# Patient Record
Sex: FEMALE | Race: WHITE | NOT HISPANIC OR LATINO | Employment: UNEMPLOYED | URBAN - METROPOLITAN AREA
[De-identification: names, ages, dates, MRNs, and addresses within clinical notes are randomized per-mention and may not be internally consistent; named-entity substitution may affect disease eponyms.]

---

## 2017-12-29 ENCOUNTER — OFFICE VISIT (OUTPATIENT)
Dept: OBSTETRICS AND GYNECOLOGY | Facility: CLINIC | Age: 52
End: 2017-12-29
Payer: COMMERCIAL

## 2017-12-29 VITALS
WEIGHT: 172.5 LBS | HEIGHT: 68 IN | DIASTOLIC BLOOD PRESSURE: 80 MMHG | SYSTOLIC BLOOD PRESSURE: 118 MMHG | BODY MASS INDEX: 26.14 KG/M2

## 2017-12-29 DIAGNOSIS — Z01.419 ENCOUNTER FOR GYNECOLOGICAL EXAMINATION: Primary | ICD-10-CM

## 2017-12-29 DIAGNOSIS — R51.9 FREQUENT HEADACHES: ICD-10-CM

## 2017-12-29 DIAGNOSIS — R11.0 NAUSEA: ICD-10-CM

## 2017-12-29 PROCEDURE — 99999 PR PBB SHADOW E&M-EST. PATIENT-LVL III: CPT | Mod: PBBFAC,,, | Performed by: OBSTETRICS & GYNECOLOGY

## 2017-12-29 PROCEDURE — 99396 PREV VISIT EST AGE 40-64: CPT | Mod: S$GLB,,, | Performed by: OBSTETRICS & GYNECOLOGY

## 2017-12-29 RX ORDER — BUTALBITAL, ACETAMINOPHEN AND CAFFEINE 50; 325; 40 MG/1; MG/1; MG/1
1 TABLET ORAL EVERY 4 HOURS PRN
Qty: 30 TABLET | Refills: 1 | Status: SHIPPED | OUTPATIENT
Start: 2017-12-29 | End: 2018-03-09 | Stop reason: SDUPTHER

## 2017-12-29 RX ORDER — TRETINOIN 0.8 MG/G
GEL TOPICAL
Refills: 2 | COMMUNITY
Start: 2017-11-14 | End: 2018-12-28

## 2017-12-29 RX ORDER — TRAMADOL HYDROCHLORIDE 50 MG/1
TABLET ORAL
COMMUNITY
Start: 2017-11-02 | End: 2019-09-13

## 2017-12-29 RX ORDER — PROMETHAZINE HYDROCHLORIDE 25 MG/1
25 TABLET ORAL
Qty: 30 TABLET | Refills: 1 | Status: SHIPPED | OUTPATIENT
Start: 2017-12-29 | End: 2018-12-28

## 2017-12-29 NOTE — PROGRESS NOTES
Subjective:       Patient ID: Sharri Hill is a 52 y.o. female.    Chief Complaint:  Well Woman (pap nl/hpv- 2016 mammo 2017)      History of Present Illness  - here for annual. Has skipped four periods twice. Had a period for three months in between. Is having some night sweats. Has had some increased hair loss; has appt with PCP in 2 weeks. Requests fioricet and phenergan refills for occasional headaches; reports that frequency of headaches has diminished since skipping periods.    Past Medical History:   Diagnosis Date    Anxiety     Esophageal reflux     Migraine     Shingles     right arm       Past Surgical History:   Procedure Laterality Date    APPENDECTOMY       SECTION      KNEE SURGERY           Current Outpatient Prescriptions:     butalbital-acetaminophen-caffeine -40 mg (FIORICET) -40 mg per tablet, Take 1 tablet by mouth every 4 (four) hours as needed for Pain., Disp: 30 tablet, Rfl: 1    cyclobenzaprine (FLEXERIL) 10 MG tablet, TAKE 1 TABLET BY MOUTH TWICE A DAY AS NEEDED FOR SPASMS, Disp: , Rfl: 5    FLUCELVAX QUAD 5244-8309 60 mcg (15 mcg x 4)/0.5 mL Susp, TO BE ADMINISTERED BY PHARMACIST, Disp: , Rfl: 0    hydrocodone-acetaminophen 7.5-325mg (NORCO) 7.5-325 mg per tablet, TAKE 1 TABLET BY MOUTH EVERY EIGHT HOURS AS NEEDED FOR PAIN, Disp: , Rfl: 0    nabumetone (RELAFEN) 750 MG tablet, Take 750 mg by mouth 2 (two) times daily., Disp: , Rfl: 5    naproxen sodium (ANAPROX DS) 550 MG tablet, 1 po q 8 hrs prn cramping, Disp: 30 tablet, Rfl: 1    promethazine (PHENERGAN) 25 MG tablet, Take 1 tablet (25 mg total) by mouth every 4 to 6 hours as needed., Disp: 30 tablet, Rfl: 1    RESTASIS 0.05 % ophthalmic emulsion, Instill1 drop in both eyes twice a day, Disp: , Rfl: 3    RETIN-A MICRO PUMP 0.08 % GlwP, THALIA AA HS, Disp: , Rfl: 2    traMADol (ULTRAM) 50 mg tablet, , Disp: , Rfl:     zolpidem (AMBIEN) 5 MG Tab, Take 5 mg by mouth every evening., Disp: , Rfl:  "2    Review of patient's allergies indicates:  No Known Allergies    GYN & OB History  No LMP recorded (within months).   Date of Last Pap: 2016    OB History    Para Term  AB Living   1 1 1     1   SAB TAB Ectopic Multiple Live Births           1      # Outcome Date GA Lbr Dylan/2nd Weight Sex Delivery Anes PTL Lv   1 Term 95 40w0d   F CS-Unspec   EDYTA          Social History     Social History    Marital status:      Spouse name: N/A    Number of children: N/A    Years of education: N/A     Occupational History    Not on file.     Social History Main Topics    Smoking status: Never Smoker    Smokeless tobacco: Never Used    Alcohol use Yes      Comment: socially    Drug use: No    Sexual activity: Yes     Partners: Male     Birth control/ protection: Partner-Vasectomy     Other Topics Concern    Not on file     Social History Narrative    No narrative on file       Family History   Problem Relation Age of Onset    Diabetes Maternal Grandmother     Diabetes Maternal Grandfather     Breast cancer Maternal Aunt     Colon cancer Neg Hx     Ovarian cancer Neg Hx     Hypertension Neg Hx        Review of Systems  Review of Systems   Respiratory: Negative for shortness of breath.    Cardiovascular: Negative for chest pain and palpitations.   Gastrointestinal: Negative for blood in stool, nausea and vomiting.   Genitourinary:        - see HPI   Skin: Negative for rash and wound.   Allergic/Immunologic: Negative for immunocompromised state.   Neurological: Negative for dizziness and syncope.   Hematological: Negative for adenopathy.   Psychiatric/Behavioral: Negative for behavioral problems.        Objective:     Vitals:    17 1134   BP: 118/80   Weight: 78.3 kg (172 lb 8.2 oz)   Height: 5' 8" (1.727 m)       Physical Exam:   Constitutional: She is oriented to person, place, and time. She appears well-developed and well-nourished.        Pulmonary/Chest: Right breast " exhibits no mass, no nipple discharge, no skin change, no tenderness and no swelling. Left breast exhibits no mass, no nipple discharge, no skin change, no tenderness and no swelling. Breasts are symmetrical.        Abdominal: Soft. She exhibits no distension. There is no tenderness.     Genitourinary: Vagina normal and uterus normal. There is no tenderness or lesion on the right labia. There is no tenderness or lesion on the left labia. Cervix is normal. Right adnexum displays no mass, no tenderness and no fullness. Left adnexum displays no mass, no tenderness and no fullness. No vaginal discharge found.           Musculoskeletal: Moves all extremeties.       Neurological: She is alert and oriented to person, place, and time.     Psychiatric: She has a normal mood and affect.        Assessment/ Plan:     Orders Placed This Encounter    butalbital-acetaminophen-caffeine -40 mg (FIORICET) -40 mg per tablet    promethazine (PHENERGAN) 25 MG tablet       Sharri was seen today for well woman.    Diagnoses and all orders for this visit:    Encounter for gynecological examination    Frequent headaches  -     butalbital-acetaminophen-caffeine -40 mg (FIORICET) -40 mg per tablet; Take 1 tablet by mouth every 4 (four) hours as needed for Pain.    Nausea  -     promethazine (PHENERGAN) 25 MG tablet; Take 1 tablet (25 mg total) by mouth every 4 to 6 hours as needed.    - reassured: very normal bleeding pattern for perimenopause.  - advised patient to discuss hair loss with PCP. If labs are normal, consider seeing Derm.    Return in about 1 year (around 12/29/2018) for annual exam.

## 2018-03-09 DIAGNOSIS — R51.9 FREQUENT HEADACHES: ICD-10-CM

## 2018-03-09 RX ORDER — BUTALBITAL, ACETAMINOPHEN AND CAFFEINE 50; 325; 40 MG/1; MG/1; MG/1
1 TABLET ORAL EVERY 4 HOURS PRN
Qty: 30 TABLET | Refills: 1 | Status: SHIPPED | OUTPATIENT
Start: 2018-03-09 | End: 2018-12-28 | Stop reason: SDUPTHER

## 2018-11-07 ENCOUNTER — TELEPHONE (OUTPATIENT)
Dept: OBSTETRICS AND GYNECOLOGY | Facility: CLINIC | Age: 53
End: 2018-11-07

## 2018-11-07 DIAGNOSIS — Z12.31 ENCOUNTER FOR SCREENING MAMMOGRAM FOR MALIGNANT NEOPLASM OF BREAST: Primary | ICD-10-CM

## 2018-12-28 ENCOUNTER — OFFICE VISIT (OUTPATIENT)
Dept: OBSTETRICS AND GYNECOLOGY | Facility: CLINIC | Age: 53
End: 2018-12-28
Payer: COMMERCIAL

## 2018-12-28 VITALS
HEIGHT: 68 IN | WEIGHT: 176.25 LBS | DIASTOLIC BLOOD PRESSURE: 80 MMHG | BODY MASS INDEX: 26.71 KG/M2 | SYSTOLIC BLOOD PRESSURE: 122 MMHG

## 2018-12-28 DIAGNOSIS — Z01.419 ENCOUNTER FOR GYNECOLOGICAL EXAMINATION: Primary | ICD-10-CM

## 2018-12-28 DIAGNOSIS — R51.9 FREQUENT HEADACHES: ICD-10-CM

## 2018-12-28 PROCEDURE — 99999 PR PBB SHADOW E&M-EST. PATIENT-LVL II: CPT | Mod: PBBFAC,,, | Performed by: OBSTETRICS & GYNECOLOGY

## 2018-12-28 PROCEDURE — 99396 PREV VISIT EST AGE 40-64: CPT | Mod: S$GLB,,, | Performed by: OBSTETRICS & GYNECOLOGY

## 2018-12-28 RX ORDER — GABAPENTIN 300 MG/1
300 CAPSULE ORAL NIGHTLY
Refills: 5 | COMMUNITY
Start: 2018-11-14 | End: 2020-07-20

## 2018-12-28 RX ORDER — PROMETHAZINE HYDROCHLORIDE 25 MG/1
TABLET ORAL
Qty: 30 TABLET | Refills: 1 | Status: SHIPPED | OUTPATIENT
Start: 2018-12-28 | End: 2019-12-30

## 2018-12-28 RX ORDER — BUTALBITAL, ACETAMINOPHEN AND CAFFEINE 50; 325; 40 MG/1; MG/1; MG/1
1 TABLET ORAL EVERY 4 HOURS PRN
Qty: 30 TABLET | Refills: 1 | Status: SHIPPED | OUTPATIENT
Start: 2018-12-28 | End: 2019-09-19 | Stop reason: SDUPTHER

## 2018-12-28 RX ORDER — PROMETHAZINE HYDROCHLORIDE 25 MG/1
TABLET ORAL
COMMUNITY
End: 2018-12-28 | Stop reason: SDUPTHER

## 2018-12-28 RX ORDER — TRETINOIN 0.6 MG/G
GEL TOPICAL
Refills: 2 | COMMUNITY
Start: 2018-12-06 | End: 2022-06-28

## 2018-12-28 NOTE — PROGRESS NOTES
Subjective:       Patient ID: Sharri Hill is a 53 y.o. female.    Chief Complaint:  Well Woman (pap nl/hpv- 2016 mammo 2018 )      History of Present Illness  - here for annual. Was amenorrheic for 11 months then had a 3 day period. No complaints. Has had some cramping but hasn't had any further bleeding. Hot flashes has greatly lessened as have headaches.    Past Medical History:   Diagnosis Date    Anxiety     Esophageal reflux     Migraine     Shingles     right arm       Past Surgical History:   Procedure Laterality Date    APPENDECTOMY       SECTION      KNEE SURGERY           Current Outpatient Medications:     butalbital-acetaminophen-caffeine -40 mg (FIORICET) -40 mg per tablet, Take 1 tablet by mouth every 4 (four) hours as needed for Pain., Disp: 30 tablet, Rfl: 1    cyclobenzaprine (FLEXERIL) 10 MG tablet, TAKE 1 TABLET BY MOUTH TWICE A DAY AS NEEDED FOR SPASMS, Disp: , Rfl: 5    gabapentin (NEURONTIN) 300 MG capsule, Take 300 mg by mouth every evening., Disp: , Rfl: 5    nabumetone (RELAFEN) 750 MG tablet, Take 750 mg by mouth 2 (two) times daily., Disp: , Rfl: 5    promethazine (PHENERGAN) 25 MG tablet, promethazine 25 mg tablet, Disp: 30 tablet, Rfl: 1    RESTASIS 0.05 % ophthalmic emulsion, Instill1 drop in both eyes twice a day, Disp: , Rfl: 3    RETIN-A MICRO PUMP 0.06 % GlwP, THALIA AA QHS, Disp: , Rfl: 2    traMADol (ULTRAM) 50 mg tablet, , Disp: , Rfl:     zolpidem (AMBIEN) 5 MG Tab, Take 5 mg by mouth every evening., Disp: , Rfl: 2    Review of patient's allergies indicates:  No Known Allergies    GYN & OB History  Patient's last menstrual period was 10/17/2018.   Date of Last Pap: 2016    OB History    Para Term  AB Living   1 1 1     1   SAB TAB Ectopic Multiple Live Births           1      # Outcome Date GA Lbr Dylan/2nd Weight Sex Delivery Anes PTL Lv   1 Term 95 40w0d   F CS-Unspec   EDYTA          Social History  "    Socioeconomic History    Marital status:      Spouse name: Not on file    Number of children: Not on file    Years of education: Not on file    Highest education level: Not on file   Social Needs    Financial resource strain: Not on file    Food insecurity - worry: Not on file    Food insecurity - inability: Not on file    Transportation needs - medical: Not on file    Transportation needs - non-medical: Not on file   Occupational History    Not on file   Tobacco Use    Smoking status: Never Smoker    Smokeless tobacco: Never Used   Substance and Sexual Activity    Alcohol use: Yes     Comment: socially    Drug use: No    Sexual activity: Yes     Partners: Male     Birth control/protection: Partner-Vasectomy   Other Topics Concern    Not on file   Social History Narrative    Not on file       Family History   Problem Relation Age of Onset    Diabetes Maternal Grandmother     Diabetes Maternal Grandfather     Breast cancer Maternal Aunt     Colon cancer Neg Hx     Ovarian cancer Neg Hx     Hypertension Neg Hx        Review of Systems  Review of Systems   Respiratory: Negative for shortness of breath.    Cardiovascular: Negative for chest pain and palpitations.   Gastrointestinal: Negative for blood in stool, nausea and vomiting.   Genitourinary:        - see HPI   Skin: Negative for rash and wound.   Allergic/Immunologic: Negative for immunocompromised state.   Neurological: Negative for dizziness and syncope.   Hematological: Negative for adenopathy.   Psychiatric/Behavioral: Negative for behavioral problems.        Objective:     Vitals:    12/28/18 1049   BP: 122/80   Weight: 79.9 kg (176 lb 4.1 oz)   Height: 5' 8" (1.727 m)       Physical Exam:   Constitutional: She is oriented to person, place, and time. She appears well-developed and well-nourished.        Pulmonary/Chest: Right breast exhibits no mass, no nipple discharge, no skin change, no tenderness and no swelling. Left " breast exhibits no mass, no nipple discharge, no skin change, no tenderness and no swelling. Breasts are symmetrical.        Abdominal: Soft. She exhibits no distension. There is no tenderness.     Genitourinary: Vagina normal and uterus normal. There is no tenderness or lesion on the right labia. There is no tenderness or lesion on the left labia. Cervix is normal. Right adnexum displays no mass, no tenderness and no fullness. Left adnexum displays no mass, no tenderness and no fullness. No vaginal discharge found.           Musculoskeletal: Moves all extremeties.       Neurological: She is alert and oriented to person, place, and time.     Psychiatric: She has a normal mood and affect.        Assessment/ Plan:     Orders Placed This Encounter    butalbital-acetaminophen-caffeine -40 mg (FIORICET) -40 mg per tablet    promethazine (PHENERGAN) 25 MG tablet       Sharri was seen today for well woman.    Diagnoses and all orders for this visit:    Encounter for gynecological examination    Frequent headaches  -     butalbital-acetaminophen-caffeine -40 mg (FIORICET) -40 mg per tablet; Take 1 tablet by mouth every 4 (four) hours as needed for Pain.    Other orders  -     promethazine (PHENERGAN) 25 MG tablet; promethazine 25 mg tablet        Follow-up in about 1 year (around 12/28/2019) for annual exam.

## 2019-08-08 RX ORDER — ZOLPIDEM TARTRATE 5 MG/1
5 TABLET ORAL NIGHTLY
Qty: 30 TABLET | Refills: 0 | Status: SHIPPED | OUTPATIENT
Start: 2019-08-08 | End: 2019-09-07

## 2019-08-08 NOTE — TELEPHONE ENCOUNTER
Spoke to pt, she has appt with you on 9/13. Asking for Ambien 10mg 1 po qhs to be sent to [facility] in [place]. Last filled the first week of July. Ok to fill?

## 2019-08-08 NOTE — TELEPHONE ENCOUNTER
----- Message from Nat Perdue sent at 8/8/2019 11:39 AM CDT -----  Contact: Sharri  Type:  RX Refill Request    Who Called:  Sharri  Refill or New Rx: Refill  RX Name and Strength: Zoltizoltizem  10 mg  How is the patient currently taking it? (ex. 1XDay): 1XDAY  Is this a 30 day or 90 day RX: 30 Day  Preferred Pharmacy with phone number: [facility] [phone]  Local or Mail Order: Local   Ordering Provider:Lidya  Would the patient rather a call back or a response via [facility]? Call Back  Best Call Back Number: [phone]  Additional Information:Pt has appt scheduled for 9/13/19  Pt is requesting a refill on   Zoltizoltizem  10 mg.

## 2019-08-21 ENCOUNTER — TELEPHONE (OUTPATIENT)
Dept: OBSTETRICS AND GYNECOLOGY | Facility: CLINIC | Age: 54
End: 2019-08-21

## 2019-08-21 DIAGNOSIS — Z12.31 VISIT FOR SCREENING MAMMOGRAM: Primary | ICD-10-CM

## 2019-08-21 NOTE — TELEPHONE ENCOUNTER
"Bernardo pt- states for the past 8 months she has been having pain with intercourse. The last few times though, she said she could not get through is because it "feels like hitting a brick wall." Lubricant does not help at all.   Friends suggested it might be menopause. Had a period for the first time in 11.5 months in July. Did not need a tampon, only brown in color. This is the 2nd time she has gone 11.5 months and then gotten a period.   Did have night sweats for bit, they are gone now.  Does NOT want HRT    Scheduled on 9/19 with Bernardo. Declined earlier options, [facility] only    MM[facility] order also faxed to [facility] per request  "

## 2019-08-21 NOTE — TELEPHONE ENCOUNTER
Dr. Chang pt, says she been experiencing pain during intercourse for about a year now. Last 8 months she says its gotten worse. Pt states she is still having periods so doesn't think its because of menopause. Please call pt and advise, thank you.

## 2019-08-30 ENCOUNTER — PATIENT OUTREACH (OUTPATIENT)
Dept: ADMINISTRATIVE | Facility: HOSPITAL | Age: 54
End: 2019-08-30

## 2019-09-05 RX ORDER — ZOLPIDEM TARTRATE 10 MG/1
10 TABLET ORAL NIGHTLY PRN
Qty: 30 TABLET | Refills: 0 | Status: SHIPPED | OUTPATIENT
Start: 2019-09-05 | End: 2019-09-13 | Stop reason: SDUPTHER

## 2019-09-05 RX ORDER — ZOLPIDEM TARTRATE 10 MG/1
10 TABLET ORAL NIGHTLY PRN
COMMUNITY
End: 2019-09-05 | Stop reason: SDUPTHER

## 2019-09-13 ENCOUNTER — OFFICE VISIT (OUTPATIENT)
Dept: FAMILY MEDICINE | Facility: CLINIC | Age: 54
End: 2019-09-13
Payer: COMMERCIAL

## 2019-09-13 VITALS
RESPIRATION RATE: 18 BRPM | TEMPERATURE: 98 F | WEIGHT: 157.31 LBS | OXYGEN SATURATION: 98 % | BODY MASS INDEX: 24.69 KG/M2 | DIASTOLIC BLOOD PRESSURE: 86 MMHG | SYSTOLIC BLOOD PRESSURE: 118 MMHG | HEIGHT: 67 IN | HEART RATE: 85 BPM

## 2019-09-13 DIAGNOSIS — Z00.00 ANNUAL PHYSICAL EXAM: Primary | ICD-10-CM

## 2019-09-13 DIAGNOSIS — G47.09 OTHER INSOMNIA: ICD-10-CM

## 2019-09-13 DIAGNOSIS — M54.12 CERVICAL RADICULOPATHY: ICD-10-CM

## 2019-09-13 DIAGNOSIS — M17.12 PRIMARY OSTEOARTHRITIS OF LEFT KNEE: ICD-10-CM

## 2019-09-13 DIAGNOSIS — M50.90 CERVICAL DISC DISEASE: ICD-10-CM

## 2019-09-13 DIAGNOSIS — F41.9 ANXIETY: ICD-10-CM

## 2019-09-13 PROCEDURE — 99999 PR PBB SHADOW E&M-EST. PATIENT-LVL III: CPT | Mod: PBBFAC,,, | Performed by: INTERNAL MEDICINE

## 2019-09-13 PROCEDURE — 99214 OFFICE O/P EST MOD 30 MIN: CPT | Mod: S$GLB,,, | Performed by: INTERNAL MEDICINE

## 2019-09-13 PROCEDURE — 99214 PR OFFICE/OUTPT VISIT, EST, LEVL IV, 30-39 MIN: ICD-10-PCS | Mod: S$GLB,,, | Performed by: INTERNAL MEDICINE

## 2019-09-13 PROCEDURE — 99999 PR PBB SHADOW E&M-EST. PATIENT-LVL III: ICD-10-PCS | Mod: PBBFAC,,, | Performed by: INTERNAL MEDICINE

## 2019-09-13 RX ORDER — DICLOFENAC SODIUM 10 MG/G
GEL TOPICAL
COMMUNITY
Start: 2019-08-21 | End: 2019-09-13 | Stop reason: ALTCHOICE

## 2019-09-13 RX ORDER — ZOLPIDEM TARTRATE 10 MG/1
10 TABLET ORAL NIGHTLY PRN
Qty: 30 TABLET | Refills: 0 | Status: SHIPPED | OUTPATIENT
Start: 2019-09-13 | End: 2019-10-01 | Stop reason: SDUPTHER

## 2019-09-13 RX ORDER — ESCITALOPRAM OXALATE 10 MG/1
10 TABLET ORAL DAILY
Qty: 30 TABLET | Refills: 0 | Status: SHIPPED | OUTPATIENT
Start: 2019-09-13 | End: 2019-10-01 | Stop reason: SDUPTHER

## 2019-09-13 RX ORDER — NABUMETONE 750 MG/1
750 TABLET, FILM COATED ORAL 2 TIMES DAILY
Qty: 60 TABLET | Refills: 0 | Status: SHIPPED | OUTPATIENT
Start: 2019-09-13 | End: 2019-10-30 | Stop reason: SDUPTHER

## 2019-09-13 RX ORDER — HYDROCODONE BITARTRATE AND ACETAMINOPHEN 7.5; 325 MG/1; MG/1
TABLET ORAL
COMMUNITY
Start: 2019-08-13 | End: 2019-12-30

## 2019-09-13 NOTE — PROGRESS NOTES
[facility] Note    Chief Complaint      Chief Complaint   Patient presents with    Medication Refill    Anxiety       History of Present Illness      Sharri Hill is a 54 y.o. female who presents today for   Chief Complaint   Patient presents with    Medication Refill    Anxiety   .  Patient comes to appointment for annual preventative visit . She is feeling well complaining of some anxiety . Is dealing with some issues of stress related to her current neck issues .     Problem List Items Addressed This Visit        Neuro    Cervical disc disease    Cervical radiculopathy       Orthopedic    Primary osteoarthritis of left knee       Other    Annual physical exam - Primary    Overview     pe documented needs full screening labs will order          Other insomnia      Other Visit Diagnoses     Anxiety                Past Medical History:  Past Medical History:   Diagnosis Date    Anxiety     Esophageal reflux     Migraine     Shingles     right arm       Past Surgical History:  Past Surgical History:   Procedure Laterality Date    APPENDECTOMY       SECTION      KNEE SURGERY         Family History:  family history includes Breast cancer in her maternal aunt; Diabetes in her maternal grandfather and maternal grandmother.    Social History:  Social History     Socioeconomic History    Marital status:      Spouse name: Not on file    Number of children: Not on file    Years of education: Not on file    Highest education level: Not on file   Occupational History    Not on file   Social Needs    Financial resource strain: Not on file    Food insecurity:     Worry: Not on file     Inability: Not on file    Transportation needs:     Medical: Not on file     Non-medical: Not on file   Tobacco Use    Smoking status: Never Smoker    Smokeless tobacco: Never Used   Substance and Sexual Activity    Alcohol use: Yes     Comment: socially    Drug use: No    Sexual  activity: Yes     Partners: Male     Birth control/protection: Partner-Vasectomy   Lifestyle    Physical activity:     Days per week: Not on file     Minutes per session: Not on file    Stress: Not on file   Relationships    Social connections:     Talks on phone: Not on file     Gets together: Not on file     Attends Zoroastrianism service: Not on file     Active member of club or organization: Not on file     Attends meetings of clubs or organizations: Not on file     Relationship status: Not on file   Other Topics Concern    Not on file   Social History Narrative    Not on file       Review of Systems:   Review of Systems   Constitutional: Negative for fever and weight loss.   HENT: Negative for congestion, hearing loss and sore throat.    Eyes: Negative for blurred vision.   Respiratory: Negative for cough and shortness of breath.    Cardiovascular: Negative for chest pain, palpitations, claudication and leg swelling.   Gastrointestinal: Negative for abdominal pain, constipation and diarrhea.   Genitourinary: Negative for dysuria.   Musculoskeletal: Positive for neck pain. Negative for back pain and myalgias.   Skin: Negative for rash.   Neurological: Positive for tingling. Negative for focal weakness and headaches.   Psychiatric/Behavioral: Negative for depression and suicidal ideas. The patient has insomnia. The patient is not nervous/anxious.          Medications:  Outpatient Encounter Medications as of 9/13/2019   Medication Sig Note Dispense Refill    butalbital-acetaminophen-caffeine -40 mg (FIORICET) -40 mg per tablet Take 1 tablet by mouth every 4 (four) hours as needed for Pain.  30 tablet 1    gabapentin (NEURONTIN) 300 MG capsule Take 300 mg by mouth every evening.   5    HYDROcodone-acetaminophen (NORCO) 7.5-325 mg per tablet        nabumetone (RELAFEN) 750 MG tablet Take 1 tablet (750 mg total) by mouth 2 (two) times daily.  60 tablet 0    RESTASIS 0.05 % ophthalmic emulsion  Instill1 drop in both eyes twice a day 11/10/2016: Received from: External Pharmacy  3    RETIN-A MICRO PUMP 0.06 % GlwP THALIA AA QHS 9/13/2019: Take as needed  2    zolpidem (AMBIEN) 10 mg Tab Take 1 tablet (10 mg total) by mouth nightly as needed.  30 tablet 0    [DISCONTINUED] diclofenac sodium (VOLTAREN) 1 % Gel        [DISCONTINUED] nabumetone (RELAFEN) 750 MG tablet Take 750 mg by mouth 2 (two) times daily. 11/10/2016: Received from: External Pharmacy  5    [DISCONTINUED] zolpidem (AMBIEN) 10 mg Tab Take 1 tablet (10 mg total) by mouth nightly as needed.  30 tablet 0    escitalopram oxalate (LEXAPRO) 10 MG tablet Take 1 tablet (10 mg total) by mouth once daily.  30 tablet 0    promethazine (PHENERGAN) 25 MG tablet promethazine 25 mg tablet 9/13/2019: Take as needed 30 tablet 1    [DISCONTINUED] cyclobenzaprine (FLEXERIL) 10 MG tablet TAKE 1 TABLET BY MOUTH TWICE A DAY AS NEEDED FOR SPASMS 11/10/2016: Received from: External Pharmacy  5    [DISCONTINUED] traMADol (ULTRAM) 50 mg tablet  12/29/2017: Received from: External Pharmacy       No facility-administered encounter medications on file as of 9/13/2019.         Allergies:  Review of patient's allergies indicates:  No Known Allergies      Physical Exam      Vitals:    09/13/19 1059   BP: 118/86   Pulse: 85   Resp: 18   Temp: 97.9 °F (36.6 °C)      Body mass index is 24.64 kg/m².    Physical Exam   Constitutional: She is oriented to person, place, and time. She appears well-developed and well-nourished.   HENT:   Mouth/Throat: Oropharynx is clear and moist.   Eyes: Pupils are equal, round, and reactive to light. EOM are normal.   Neck: Muscular tenderness present. Decreased range of motion present. No thyromegaly present.   Cardiovascular: Normal rate and normal heart sounds. Exam reveals no gallop and no friction rub.   No murmur heard.  Pulmonary/Chest: Breath sounds normal.   Abdominal: Soft. Bowel sounds are normal. There is no hepatomegaly.    Musculoskeletal: She exhibits tenderness.   Lymphadenopathy:     She has no cervical adenopathy.   Neurological: She is alert and oriented to person, place, and time. No cranial nerve deficit.   Skin: Skin is warm. No rash noted.   Psychiatric: She has a normal mood and affect. Her behavior is normal.        Laboratory:  CBC:  No results for input(s): WBC, RBC, HGB, HCT, PLT, MCV, MCH, MCHC in the last 2160 hours.  CMP:  No results for input(s): GLU, CALCIUM, ALBUMIN, PROT, NA, K, CO2, CL, BUN, ALKPHOS, ALT, AST, BILITOT in the last 2160 hours.    Invalid input(s): CREATININ  URINALYSIS:  No results for input(s): COLORU, CLARITYU, SPECGRAV, PHUR, PROTEINUA, GLUCOSEU, BILIRUBINCON, BLOODU, WBCU, RBCU, BACTERIA, MUCUS, NITRITE, LEUKOCYTESUR, UROBILINOGEN, HYALINECASTS in the last 2160 hours.   LIPIDS:  No results for input(s): TSH, HDL, CHOL, TRIG, LDLCALC, CHOLHDL, NONHDLCHOL, TOTALCHOLEST in the last 2160 hours.  TSH:  No results for input(s): TSH in the last 2160 hours.  A1C:  No results for input(s): HGBA1C in the last 2160 hours.    Radiology:        Assessment:     Sharri Hill is a 54 y.o.female with:    Annual physical exam  -     Luteinizing hormone; Future; Expected date: 09/13/2019  -     Follicle stimulating hormone; Future; Expected date: 09/13/2019  -     CBC auto differential; Future; Expected date: 09/13/2019  -     Lipid panel; Future; Expected date: 09/13/2019  -     Comprehensive metabolic panel; Future; Expected date: 09/13/2019    Cervical disc disease    Cervical radiculopathy    Primary osteoarthritis of left knee  -     nabumetone (RELAFEN) 750 MG tablet; Take 1 tablet (750 mg total) by mouth 2 (two) times daily.  Dispense: 60 tablet; Refill: 0    Other insomnia  -     zolpidem (AMBIEN) 10 mg Tab; Take 1 tablet (10 mg total) by mouth nightly as needed.  Dispense: 30 tablet; Refill: 0    Anxiety  -     escitalopram oxalate (LEXAPRO) 10 MG tablet; Take 1 tablet (10 mg total) by mouth once  daily.  Dispense: 30 tablet; Refill: 0          Plan:     As above, continue current medications and maintain follow up with specialists.  Return to clinic in 6 months.    Frederick W Dantagnan [facility] [place]

## 2019-09-18 LAB
ALBUMIN: 4.6 GRAM/DL (ref 3.5–5)
ALP SERPL-CCNC: 62 UNIT/L (ref 38–126)
ALT SERPL W P-5'-P-CCNC: 19 UNIT/L (ref 7–56)
ANION GAP SERPL CALC-SCNC: 16 MEQ/L (ref 9–18)
AST SERPL-CCNC: 19 UNIT/L (ref 7–40)
BASOPHILS ABSOLUTE COUNT: 0 K/UL (ref 0–0.2)
BASOPHILS NFR BLD: 1 % (ref 0–2)
BILIRUB SERPL-MCNC: 0.7 MG/DL (ref 0–1.2)
BUN BLD-MCNC: 14 MG/DL (ref 7–21)
BUN/CREAT SERPL: 20 RATIO (ref 6–22)
CALC OSMOLALITY: 284 MOSM/KG (ref 275–295)
CALCIUM SERPL-MCNC: 9.7 MG/DL (ref 8.5–10.4)
CHLORIDE SERPL-SCNC: 102 MEQ/L (ref 98–107)
CHOL/HDLC RATIO: 3
CHOLEST SERPL-MSCNC: 246 MG/DL (ref 100–200)
CO2 SERPL-SCNC: 28 MEQ/L (ref 21–31)
CREAT SERPL-MCNC: 0.7 MG/DL (ref 0.5–1)
DIFFERENTIAL TYPE: ABNORMAL
EOSINOPHIL NFR BLD: 2.9 % (ref 0–4)
EOSINOPHILS ABSOLUTE COUNT: 0.1 K/UL (ref 0–0.7)
ERYTHROCYTE [DISTWIDTH] IN BLOOD BY AUTOMATED COUNT: 13.2 GRAM/DL (ref 12–15.3)
FSH SERPL-ACNC: 76.9 MIU/ML
GFR: 88.3 ML/MIN/1.73M2
GLUCOSE SERPL-MCNC: 97 MG/DL (ref 70–100)
HCT VFR BLD AUTO: 40.3 % (ref 37–47)
HDLC SERPL-MCNC: 90 MG/DL (ref 40–75)
HGB BLD-MCNC: 13.8 GRAM/DL (ref 12–16)
LDLC SERPL CALC-MCNC: 145 MG/DL (ref 0–125)
LH SERPL-ACNC: 38.8 MIU/ML
LYMPHOCYTES %: 31.9 % (ref 15–45)
LYMPHOCYTES ABSOLUTE COUNT: 1.2 K/UL (ref 1–4.2)
MCH RBC QN AUTO: 34.2 PICOGRAM (ref 27–33)
MCHC RBC AUTO-ENTMCNC: 34.3 GRAM/DL (ref 32–36)
MCV RBC AUTO: 99.5 FEMTOLITER (ref 81–99)
MONOCYTES %: 9.2 % (ref 3–13)
MONOCYTES ABSOLUTE COUNT: 0.3 K/UL (ref 0.1–0.8)
NEUTROPHILS ABSOLUTE COUNT: 2 K/UL (ref 2.1–7.6)
NEUTROPHILS RELATIVE PERCENT: 55 % (ref 32–80)
NONHDLC SERPL-MCNC: 156 MG/DL (ref 60–125)
PLATELET # BLD AUTO: 258 K/UL (ref 150–350)
PMV BLD AUTO: 9 FEMTOLITER (ref 7–10.2)
POTASSIUM SERPL-SCNC: 4.5 MEQ/L (ref 3.5–5)
RBC # BLD AUTO: 4.05 MIL/UL (ref 4.2–5.4)
SODIUM BLD-SCNC: 142 MEQ/L (ref 135–145)
TOTAL PROTEIN: 7.3 GRAM/DL (ref 6.3–8.2)
TRIGL SERPL-MCNC: 66 MG/DL (ref 30–150)
WBC # BLD AUTO: 3.7 K/UL (ref 4.5–11)

## 2019-09-19 ENCOUNTER — OFFICE VISIT (OUTPATIENT)
Dept: OBSTETRICS AND GYNECOLOGY | Facility: CLINIC | Age: 54
End: 2019-09-19
Payer: COMMERCIAL

## 2019-09-19 VITALS — SYSTOLIC BLOOD PRESSURE: 124 MMHG | BODY MASS INDEX: 24.9 KG/M2 | DIASTOLIC BLOOD PRESSURE: 82 MMHG | WEIGHT: 158.94 LBS

## 2019-09-19 DIAGNOSIS — R51.9 FREQUENT HEADACHES: ICD-10-CM

## 2019-09-19 DIAGNOSIS — N94.10 DYSPAREUNIA IN FEMALE: Primary | ICD-10-CM

## 2019-09-19 PROCEDURE — 99213 OFFICE O/P EST LOW 20 MIN: CPT | Mod: S$GLB,,, | Performed by: OBSTETRICS & GYNECOLOGY

## 2019-09-19 PROCEDURE — 99213 PR OFFICE/OUTPT VISIT, EST, LEVL III, 20-29 MIN: ICD-10-PCS | Mod: S$GLB,,, | Performed by: OBSTETRICS & GYNECOLOGY

## 2019-09-19 PROCEDURE — 99999 PR PBB SHADOW E&M-EST. PATIENT-LVL III: CPT | Mod: PBBFAC,,, | Performed by: OBSTETRICS & GYNECOLOGY

## 2019-09-19 PROCEDURE — 99999 PR PBB SHADOW E&M-EST. PATIENT-LVL III: ICD-10-PCS | Mod: PBBFAC,,, | Performed by: OBSTETRICS & GYNECOLOGY

## 2019-09-19 RX ORDER — BUTALBITAL, ACETAMINOPHEN AND CAFFEINE 50; 325; 40 MG/1; MG/1; MG/1
1 TABLET ORAL EVERY 4 HOURS PRN
Qty: 30 TABLET | Refills: 1 | Status: SHIPPED | OUTPATIENT
Start: 2019-09-19 | End: 2019-12-30 | Stop reason: SDUPTHER

## 2019-09-19 NOTE — PROGRESS NOTES
Subjective:       Patient ID: Sharri Hill is a 54 y.o. female.    Chief Complaint:  Painful [place]      History of Present Illness  - patient presents with c/o inability to have intercourse because it's so painful. Has been using lubricant for years but gradually started having pain. Beginning in April, was too painful to have intercourse at all. Started on Lexapro recently by PCP due to mood swings. Had brown spotting last month after having painful breasts. Period before that was 11 1/2 months ago!    Past Medical History:   Diagnosis Date    Anxiety     Esophageal reflux     Migraine     Shingles     right arm       Past Surgical History:   Procedure Laterality Date    APPENDECTOMY       SECTION      KNEE SURGERY           Current Outpatient Medications:     butalbital-acetaminophen-caffeine -40 mg (FIORICET) -40 mg per tablet, Take 1 tablet by mouth every 4 (four) hours as needed for Pain., Disp: 30 tablet, Rfl: 1    escitalopram oxalate (LEXAPRO) 10 MG tablet, Take 1 tablet (10 mg total) by mouth once daily., Disp: 30 tablet, Rfl: 0    gabapentin (NEURONTIN) 300 MG capsule, Take 300 mg by mouth every evening., Disp: , Rfl: 5    HYDROcodone-acetaminophen (NORCO) 7.5-325 mg per tablet, , Disp: , Rfl:     nabumetone (RELAFEN) 750 MG tablet, Take 1 tablet (750 mg total) by mouth 2 (two) times daily., Disp: 60 tablet, Rfl: 0    promethazine (PHENERGAN) 25 MG tablet, promethazine 25 mg tablet, Disp: 30 tablet, Rfl: 1    RESTASIS 0.05 % ophthalmic emulsion, Instill1 drop in both eyes twice a day, Disp: , Rfl: 3    RETIN-A MICRO PUMP 0.06 % GlwP, THALIA AA QHS, Disp: , Rfl: 2    zolpidem (AMBIEN) 10 mg Tab, Take 1 tablet (10 mg total) by mouth nightly as needed., Disp: 30 tablet, Rfl: 0    estradiol (IMVEXXY STARTER PACK) 10 mcg InPk, Insert 0.5 gms per vagina qhs x 2 weeks then twice weekly., Disp: 18 each, Rfl: 0    Review of patient's allergies indicates:  No Known  Allergies    GYN & OB History  No LMP recorded. Patient is premenopausal.   Date of Last Pap: 2016    OB History    Para Term  AB Living   1 1 1     1   SAB TAB Ectopic Multiple Live Births           1      # Outcome Date GA Lbr Dylan/2nd Weight Sex Delivery Anes PTL Lv   1 Term 95 40w0d   F CS-Unspec   EDYTA       Social History     Socioeconomic History    Marital status:      Spouse name: Not on file    Number of children: Not on file    Years of education: Not on file    Highest education level: Not on file   Occupational History    Not on file   Social Needs    Financial resource strain: Not on file    Food insecurity:     Worry: Not on file     Inability: Not on file    Transportation needs:     Medical: Not on file     Non-medical: Not on file   Tobacco Use    Smoking status: Never Smoker    Smokeless tobacco: Never Used   Substance and Sexual Activity    Alcohol use: Yes     Comment: socially    Drug use: No    Sexual activity: Yes     Partners: Male     Birth control/protection: Partner-Vasectomy   Lifestyle    Physical activity:     Days per week: Not on file     Minutes per session: Not on file    Stress: Not on file   Relationships    Social connections:     Talks on phone: Not on file     Gets together: Not on file     Attends [employer] service: Not on file     Active member of club or organization: Not on file     Attends meetings of clubs or organizations: Not on file     Relationship status: Not on file   Other Topics Concern    Not on file   Social History Narrative    Not on file       Family History   Problem Relation Age of Onset    Diabetes Maternal Grandmother     Diabetes Maternal Grandfather     Breast cancer Maternal Aunt     Colon cancer Neg Hx     Ovarian cancer Neg Hx     Hypertension Neg Hx        Review of Systems  Review of Systems   Respiratory: Negative for shortness of breath.    Cardiovascular: Negative for chest pain and  palpitations.   Gastrointestinal: Negative for blood in stool, nausea and vomiting.   Genitourinary:        - see HPI   Skin: Negative for rash and wound.   Allergic/Immunologic: Negative for immunocompromised state.   Neurological: Negative for dizziness and syncope.   Hematological: Negative for adenopathy.   Psychiatric/Behavioral: Negative for behavioral problems.        Objective:     Vitals:    09/19/19 1542   BP: 124/82   Weight: 72.1 kg (158 lb 15.2 oz)       Physical Exam:   Constitutional: She appears well-developed and well-nourished. She is cooperative. No distress.             Abdominal: Soft. Normal appearance. There is no tenderness.     Genitourinary: Vagina normal and uterus normal. There is no rash, tenderness or lesion on the right labia. There is no rash, tenderness or lesion on the left labia. Cervix is normal. Right adnexum displays no mass, no tenderness and no fullness. Left adnexum displays no mass, no tenderness and no fullness.   Genitourinary Comments: atrophic               Neurological: She is alert.          Assessment/ Plan:     Orders Placed This Encounter    estradiol (IMVEXXY STARTER PACK) 10 mcg InPk    butalbital-acetaminophen-caffeine -40 mg (FIORICET) -40 mg per tablet       Sharri was seen today for painful intercourse.    Diagnoses and all orders for this visit:    Dyspareunia in female    Frequent headaches  -     butalbital-acetaminophen-caffeine -40 mg (FIORICET) -40 mg per tablet; Take 1 tablet by mouth every 4 (four) hours as needed for Pain.    Other orders  -     estradiol (IMVEXXY STARTER PACK) 10 mcg InPk; Insert 0.5 gms per vagina qhs x 2 weeks then twice weekly.    - discussed risks/benefits of Imvexxy. Gave samples and sent Rx for starter kit.   - will continue to use lubrication. Discussed behavioral modifications to help.    Follow up for annual exam.

## 2019-09-25 ENCOUNTER — TELEPHONE (OUTPATIENT)
Dept: OBSTETRICS AND GYNECOLOGY | Facility: CLINIC | Age: 54
End: 2019-09-25

## 2019-09-25 NOTE — TELEPHONE ENCOUNTER
Pt is out of the imvexxy samples and the starter pack will not be delivered for another 3 days. Pt would like to see if she can  a few more samples to hold her over until the rx comes in. She will be in [place] for the next hour.

## 2019-10-01 DIAGNOSIS — F41.9 ANXIETY: ICD-10-CM

## 2019-10-01 DIAGNOSIS — G47.09 OTHER INSOMNIA: ICD-10-CM

## 2019-10-01 RX ORDER — ZOLPIDEM TARTRATE 10 MG/1
10 TABLET ORAL NIGHTLY PRN
Qty: 30 TABLET | Refills: 0 | Status: SHIPPED | OUTPATIENT
Start: 2019-10-01 | End: 2019-10-30 | Stop reason: SDUPTHER

## 2019-10-01 RX ORDER — ESCITALOPRAM OXALATE 10 MG/1
10 TABLET ORAL DAILY
Qty: 30 TABLET | Refills: 5 | Status: SHIPPED | OUTPATIENT
Start: 2019-10-01 | End: 2019-10-30 | Stop reason: SDUPTHER

## 2019-10-30 DIAGNOSIS — F41.9 ANXIETY: ICD-10-CM

## 2019-10-30 DIAGNOSIS — G47.09 OTHER INSOMNIA: ICD-10-CM

## 2019-10-30 DIAGNOSIS — M17.12 PRIMARY OSTEOARTHRITIS OF LEFT KNEE: ICD-10-CM

## 2019-10-30 RX ORDER — NABUMETONE 750 MG/1
750 TABLET, FILM COATED ORAL 2 TIMES DAILY
Qty: 60 TABLET | Refills: 0 | Status: SHIPPED | OUTPATIENT
Start: 2019-10-30 | End: 2019-12-02 | Stop reason: SDUPTHER

## 2019-10-30 RX ORDER — ZOLPIDEM TARTRATE 10 MG/1
10 TABLET ORAL NIGHTLY PRN
Qty: 30 TABLET | Refills: 0 | Status: SHIPPED | OUTPATIENT
Start: 2019-10-30 | End: 2019-12-02 | Stop reason: SDUPTHER

## 2019-10-30 RX ORDER — ESCITALOPRAM OXALATE 10 MG/1
10 TABLET ORAL DAILY
Qty: 30 TABLET | Refills: 5 | Status: SHIPPED | OUTPATIENT
Start: 2019-10-30 | End: 2019-12-02 | Stop reason: SDUPTHER

## 2019-12-02 DIAGNOSIS — F41.9 ANXIETY: ICD-10-CM

## 2019-12-02 DIAGNOSIS — G47.09 OTHER INSOMNIA: ICD-10-CM

## 2019-12-02 DIAGNOSIS — M17.12 PRIMARY OSTEOARTHRITIS OF LEFT KNEE: ICD-10-CM

## 2019-12-02 RX ORDER — ESCITALOPRAM OXALATE 10 MG/1
10 TABLET ORAL DAILY
Qty: 30 TABLET | Refills: 5 | Status: SHIPPED | OUTPATIENT
Start: 2019-12-02 | End: 2019-12-27 | Stop reason: SDUPTHER

## 2019-12-02 RX ORDER — NABUMETONE 750 MG/1
750 TABLET, FILM COATED ORAL 2 TIMES DAILY
Qty: 60 TABLET | Refills: 0 | Status: SHIPPED | OUTPATIENT
Start: 2019-12-02 | End: 2019-12-27 | Stop reason: SDUPTHER

## 2019-12-02 RX ORDER — ZOLPIDEM TARTRATE 10 MG/1
10 TABLET ORAL NIGHTLY PRN
Qty: 30 TABLET | Refills: 0 | Status: SHIPPED | OUTPATIENT
Start: 2019-12-02 | End: 2019-12-27 | Stop reason: SDUPTHER

## 2019-12-16 PROBLEM — Z00.00 ANNUAL PHYSICAL EXAM: Status: RESOLVED | Noted: 2019-09-13 | Resolved: 2019-12-16

## 2019-12-27 DIAGNOSIS — F41.9 ANXIETY: ICD-10-CM

## 2019-12-27 DIAGNOSIS — M17.12 PRIMARY OSTEOARTHRITIS OF LEFT KNEE: ICD-10-CM

## 2019-12-27 DIAGNOSIS — G47.09 OTHER INSOMNIA: ICD-10-CM

## 2019-12-30 ENCOUNTER — OFFICE VISIT (OUTPATIENT)
Dept: OBSTETRICS AND GYNECOLOGY | Facility: CLINIC | Age: 54
End: 2019-12-30
Attending: OBSTETRICS & GYNECOLOGY
Payer: COMMERCIAL

## 2019-12-30 VITALS
BODY MASS INDEX: 24.62 KG/M2 | SYSTOLIC BLOOD PRESSURE: 118 MMHG | DIASTOLIC BLOOD PRESSURE: 74 MMHG | HEIGHT: 67 IN | WEIGHT: 156.88 LBS

## 2019-12-30 DIAGNOSIS — Z12.4 ENCOUNTER FOR SCREENING FOR CERVICAL CANCER: ICD-10-CM

## 2019-12-30 DIAGNOSIS — Z11.51 ENCOUNTER FOR SCREENING FOR HUMAN PAPILLOMAVIRUS (HPV): ICD-10-CM

## 2019-12-30 DIAGNOSIS — Z12.31 ENCOUNTER FOR SCREENING MAMMOGRAM FOR BREAST CANCER: ICD-10-CM

## 2019-12-30 DIAGNOSIS — R51.9 FREQUENT HEADACHES: ICD-10-CM

## 2019-12-30 DIAGNOSIS — Z01.419 ENCOUNTER FOR GYNECOLOGICAL EXAMINATION: Primary | ICD-10-CM

## 2019-12-30 PROCEDURE — 99396 PREV VISIT EST AGE 40-64: CPT | Mod: S$GLB,,, | Performed by: OBSTETRICS & GYNECOLOGY

## 2019-12-30 PROCEDURE — 99396 PR PREVENTIVE VISIT,EST,40-64: ICD-10-PCS | Mod: S$GLB,,, | Performed by: OBSTETRICS & GYNECOLOGY

## 2019-12-30 PROCEDURE — 88175 CYTOPATH C/V AUTO FLUID REDO: CPT

## 2019-12-30 PROCEDURE — 87624 HPV HI-RISK TYP POOLED RSLT: CPT

## 2019-12-30 PROCEDURE — 99999 PR PBB SHADOW E&M-EST. PATIENT-LVL III: CPT | Mod: PBBFAC,,, | Performed by: OBSTETRICS & GYNECOLOGY

## 2019-12-30 PROCEDURE — 99999 PR PBB SHADOW E&M-EST. PATIENT-LVL III: ICD-10-PCS | Mod: PBBFAC,,, | Performed by: OBSTETRICS & GYNECOLOGY

## 2019-12-30 RX ORDER — CELECOXIB 200 MG/1
CAPSULE ORAL
COMMUNITY
Start: 2019-12-13 | End: 2019-12-30

## 2019-12-30 RX ORDER — ZOLPIDEM TARTRATE 10 MG/1
10 TABLET ORAL NIGHTLY PRN
Qty: 30 TABLET | Refills: 0 | Status: SHIPPED | OUTPATIENT
Start: 2019-12-30 | End: 2020-01-31 | Stop reason: SDUPTHER

## 2019-12-30 RX ORDER — FLUTICASONE PROPIONATE 50 MCG
SPRAY, SUSPENSION (ML) NASAL
COMMUNITY
Start: 2019-11-26 | End: 2023-08-02

## 2019-12-30 RX ORDER — LIDOCAINE HYDROCHLORIDE 20 MG/ML
SOLUTION ORAL; TOPICAL
COMMUNITY
Start: 2019-10-21 | End: 2020-07-20

## 2019-12-30 RX ORDER — BUTALBITAL, ACETAMINOPHEN AND CAFFEINE 50; 325; 40 MG/1; MG/1; MG/1
1 TABLET ORAL EVERY 4 HOURS PRN
Qty: 30 TABLET | Refills: 1 | Status: SHIPPED | OUTPATIENT
Start: 2019-12-30 | End: 2020-07-20 | Stop reason: SDUPTHER

## 2019-12-30 RX ORDER — TRAMADOL HYDROCHLORIDE 50 MG/1
TABLET ORAL
COMMUNITY
Start: 2019-12-23 | End: 2021-12-21

## 2019-12-30 RX ORDER — AZITHROMYCIN 250 MG/1
TABLET, FILM COATED ORAL
COMMUNITY
Start: 2019-11-26 | End: 2021-12-21

## 2019-12-30 RX ORDER — CYCLOBENZAPRINE HCL 10 MG
TABLET ORAL
COMMUNITY
Start: 2019-12-23 | End: 2021-12-21

## 2019-12-30 RX ORDER — ESCITALOPRAM OXALATE 10 MG/1
10 TABLET ORAL DAILY
Qty: 30 TABLET | Refills: 5 | Status: SHIPPED | OUTPATIENT
Start: 2019-12-30 | End: 2020-01-31 | Stop reason: SDUPTHER

## 2019-12-30 RX ORDER — NABUMETONE 750 MG/1
750 TABLET, FILM COATED ORAL 2 TIMES DAILY
Qty: 60 TABLET | Refills: 0 | Status: SHIPPED | OUTPATIENT
Start: 2019-12-30 | End: 2020-01-31 | Stop reason: SDUPTHER

## 2019-12-30 NOTE — PROGRESS NOTES
Subjective:       Patient ID: Sharri Hill is a 54 y.o. female.    Chief Complaint:  Well Woman (last pap and HPV negative 2016, last mammogram wnl per pt 2019, last dexa normal 14, last colonoscopy benign polyp )      History of Present Illness  - here for annual. Using Imvexxy. [place] is still painful even with lubrication but has noticed a slight improvement. No other issues.    Past Medical History:   Diagnosis Date    Anxiety     Esophageal reflux     Migraine     Shingles     right arm       Past Surgical History:   Procedure Laterality Date    APPENDECTOMY       SECTION      KNEE SURGERY           Current Outpatient Medications:     azithromycin (Z-PRASHANT) 250 MG tablet, , Disp: , Rfl:     butalbital-acetaminophen-caffeine -40 mg (FIORICET) -40 mg per tablet, Take 1 tablet by mouth every 4 (four) hours as needed for Pain., Disp: 30 tablet, Rfl: 1    celecoxib (CELEBREX) 200 MG capsule, , Disp: , Rfl:     cyclobenzaprine (FLEXERIL) 10 MG tablet, , Disp: , Rfl:     escitalopram oxalate (LEXAPRO) 10 MG tablet, Take 1 tablet (10 mg total) by mouth once daily., Disp: 30 tablet, Rfl: 5    estradiol (IMVEXXY STARTER PACK) 10 mcg InPk, Insert 0.5 gms per vagina qhs x 2 weeks then twice weekly., Disp: 18 each, Rfl: 3    fexofenadine HCl (ALLEGRA ORAL), Take by mouth., Disp: , Rfl:     fluticasone propionate (FLONASE) 50 mcg/actuation nasal spray, , Disp: , Rfl:     gabapentin (NEURONTIN) 300 MG capsule, Take 300 mg by mouth every evening., Disp: , Rfl: 5    LIDOCAINE VISCOUS 2 % solution, , Disp: , Rfl:     nabumetone (RELAFEN) 750 MG tablet, Take 1 tablet (750 mg total) by mouth 2 (two) times daily., Disp: 60 tablet, Rfl: 0    RESTASIS 0.05 % ophthalmic emulsion, Instill1 drop in both eyes twice a day, Disp: , Rfl: 3    RETIN-A MICRO PUMP 0.06 % GlwP, THALIA AA QHS, Disp: , Rfl: 2    traMADol (ULTRAM) 50 mg tablet, , Disp: , Rfl:     zolpidem (AMBIEN) 10 mg  Tab, Take 1 tablet (10 mg total) by mouth nightly as needed., Disp: 30 tablet, Rfl: 0    Review of patient's allergies indicates:  No Known Allergies    GYN & OB History  No LMP recorded. Patient is premenopausal.   Date of Last Pap: 2016    OB History    Para Term  AB Living   1 1 1     1   SAB TAB Ectopic Multiple Live Births           1      # Outcome Date GA Lbr Dylan/2nd Weight Sex Delivery Anes PTL Lv   1 Term 95 40w0d   F CS-Unspec   EDYTA       Social History     Socioeconomic History    Marital status:      Spouse name: Not on file    Number of children: Not on file    Years of education: Not on file    Highest education level: Not on file   Occupational History    Not on file   Social Needs    Financial resource strain: Not on file    Food insecurity:     Worry: Not on file     Inability: Not on file    Transportation needs:     Medical: Not on file     Non-medical: Not on file   Tobacco Use    Smoking status: Never Smoker    Smokeless tobacco: Never Used   Substance and Sexual Activity    Alcohol use: Yes     Comment: socially    Drug use: No    Sexual activity: Yes     Partners: Male     Birth control/protection: Partner-Vasectomy   Lifestyle    Physical activity:     Days per week: Not on file     Minutes per session: Not on file    Stress: Not on file   Relationships    Social connections:     Talks on phone: Not on file     Gets together: Not on file     Attends Taoism service: Not on file     Active member of club or organization: Not on file     Attends meetings of clubs or organizations: Not on file     Relationship status: Not on file   Other Topics Concern    Not on file   Social History Narrative    Not on file       Family History   Problem Relation Age of Onset    Diabetes Maternal Grandmother     Diabetes Maternal Grandfather     Breast cancer Maternal Aunt     Colon cancer Neg Hx     Ovarian cancer Neg Hx     Hypertension Neg Hx   "      Review of Systems  Review of Systems   Respiratory: Negative for shortness of breath.    Cardiovascular: Negative for chest pain and palpitations.   Gastrointestinal: Negative for blood in stool, nausea and vomiting.   Genitourinary:        - see HPI   Skin: Negative for rash and wound.   Allergic/Immunologic: Negative for immunocompromised state.   Neurological: Negative for dizziness and syncope.   Hematological: Negative for adenopathy.   Psychiatric/Behavioral: Negative for behavioral problems.        Objective:     Vitals:    12/30/19 1120   BP: 118/74   Weight: 71.2 kg (156 lb 13.7 oz)   Height: 5' 7" (1.702 m)       Physical Exam:   Constitutional: She is oriented to person, place, and time. She appears well-developed and well-nourished.        Pulmonary/Chest: Right breast exhibits no mass, no nipple discharge, no skin change, no tenderness and no swelling. Left breast exhibits no mass, no nipple discharge, no skin change, no tenderness and no swelling. Breasts are symmetrical.        Abdominal: Soft. She exhibits no distension. There is no tenderness.     Genitourinary: Vagina normal and uterus normal. There is no tenderness or lesion on the right labia. There is no tenderness or lesion on the left labia. Cervix is normal. Right adnexum displays no mass, no tenderness and no fullness. Left adnexum displays no mass, no tenderness and no fullness. No vaginal discharge found. Additional cervical findings: pap smear done  Genitourinary Comments: Stenotic os           Musculoskeletal: Moves all extremeties.       Neurological: She is alert and oriented to person, place, and time.     Psychiatric: She has a normal mood and affect.        Assessment/ Plan:     Orders Placed This Encounter    HPV High Risk Genotypes, PCR    Mammo Digital Screening Bilat w/ Kenn    Liquid-Based Pap Smear, Screening    butalbital-acetaminophen-caffeine -40 mg (FIORICET) -40 mg per tablet       Sharri was seen " today for well woman.    Diagnoses and all orders for this visit:    Encounter for gynecological examination    Encounter for screening for cervical cancer   -     Liquid-Based Pap Smear, Screening    Encounter for screening for human papillomavirus (HPV)  -     HPV High Risk Genotypes, PCR    Encounter for screening mammogram for breast cancer  -     Mammo Digital Screening Bilat w/ Kenn; Future  -     Mammo Digital Screening Bilat w/ Kenn    Frequent headaches  -     butalbital-acetaminophen-caffeine -40 mg (FIORICET) -40 mg per tablet; Take 1 tablet by mouth every 4 (four) hours as needed for Pain.    - advised that she will continue to notice a difference the longer she uses Imvexxy. After Jan 1, will check insurance to see if Intrarosa is covered.    Follow up in about 1 year (around 12/30/2020) for annual exam.

## 2020-01-06 LAB
HPV HR 12 DNA SPEC QL NAA+PROBE: NEGATIVE
HPV16 AG SPEC QL: NEGATIVE
HPV18 DNA SPEC QL NAA+PROBE: NEGATIVE

## 2020-01-10 NOTE — TELEPHONE ENCOUNTER
----- Message from Kierra Chang MD sent at 1/10/2020  2:13 PM CST -----  Patient would like to switch to Intrarosa if it is now covered by her insurance plan this year. Would you please check into it? (Or tell her how to). Thanks.    ----- Message -----  From: Kierra Chang MD  Sent: 12/30/2019  11:32 AM CST  To: Kierra Chang MD    Check insurance for Intrarosa

## 2020-01-10 NOTE — TELEPHONE ENCOUNTER
Spoke with pt and advised her how to obtain coupon card online. Informed pt that I will have Dr. Chang sign Rx on Monday when she returns. Pt verbalized understanding but wanted to know if Dr. Chang wanted her to stop the Imvexxy if she is able to start the Intrarosa or if she needs to do both. Advised pt that she will probably be switching and no longer taking Imvexxy but that I would double check with Dr. Chang and let her know next week.

## 2020-01-15 LAB
FINAL PATHOLOGIC DIAGNOSIS: NORMAL
Lab: NORMAL

## 2020-01-31 DIAGNOSIS — G47.09 OTHER INSOMNIA: ICD-10-CM

## 2020-01-31 DIAGNOSIS — M17.12 PRIMARY OSTEOARTHRITIS OF LEFT KNEE: ICD-10-CM

## 2020-01-31 DIAGNOSIS — F41.9 ANXIETY: ICD-10-CM

## 2020-01-31 RX ORDER — NABUMETONE 750 MG/1
TABLET, FILM COATED ORAL
Qty: 60 TABLET | Refills: 0 | OUTPATIENT
Start: 2020-01-31

## 2020-01-31 RX ORDER — ESCITALOPRAM OXALATE 10 MG/1
10 TABLET ORAL DAILY
Qty: 30 TABLET | Refills: 5 | Status: SHIPPED | OUTPATIENT
Start: 2020-01-31 | End: 2020-07-20

## 2020-01-31 RX ORDER — NABUMETONE 750 MG/1
750 TABLET, FILM COATED ORAL 2 TIMES DAILY
Qty: 60 TABLET | Refills: 0 | Status: SHIPPED | OUTPATIENT
Start: 2020-01-31 | End: 2020-03-02

## 2020-01-31 RX ORDER — ZOLPIDEM TARTRATE 10 MG/1
10 TABLET ORAL NIGHTLY PRN
Qty: 30 TABLET | Refills: 0 | Status: SHIPPED | OUTPATIENT
Start: 2020-01-31 | End: 2020-02-26

## 2020-01-31 RX ORDER — ZOLPIDEM TARTRATE 10 MG/1
TABLET ORAL
Qty: 30 TABLET | Refills: 0 | OUTPATIENT
Start: 2020-01-31

## 2020-02-26 DIAGNOSIS — G47.09 OTHER INSOMNIA: ICD-10-CM

## 2020-02-26 RX ORDER — ZOLPIDEM TARTRATE 10 MG/1
TABLET ORAL
Qty: 30 TABLET | Refills: 0 | Status: SHIPPED | OUTPATIENT
Start: 2020-02-26 | End: 2020-03-19 | Stop reason: SDUPTHER

## 2020-03-01 DIAGNOSIS — M17.12 PRIMARY OSTEOARTHRITIS OF LEFT KNEE: ICD-10-CM

## 2020-03-02 RX ORDER — NABUMETONE 750 MG/1
TABLET, FILM COATED ORAL
Qty: 60 TABLET | Refills: 0 | Status: SHIPPED | OUTPATIENT
Start: 2020-03-02 | End: 2020-04-27

## 2020-03-19 DIAGNOSIS — G47.09 OTHER INSOMNIA: ICD-10-CM

## 2020-03-19 RX ORDER — ZOLPIDEM TARTRATE 10 MG/1
10 TABLET ORAL NIGHTLY PRN
Qty: 30 TABLET | Refills: 0 | Status: SHIPPED | OUTPATIENT
Start: 2020-03-19 | End: 2020-04-27

## 2020-03-20 ENCOUNTER — TELEPHONE (OUTPATIENT)
Dept: FAMILY MEDICINE | Facility: CLINIC | Age: 55
End: 2020-03-20

## 2020-03-20 NOTE — TELEPHONE ENCOUNTER
Spoke with pt in regards of her medication. Informed her medication was refilled and sent to her pharmacy.

## 2020-03-20 NOTE — TELEPHONE ENCOUNTER
Called and spoke with Ray in regards of pt's medication. She informed me they got the rx refill and that pt's medicaine will be ready Monday of next week.

## 2020-04-27 DIAGNOSIS — M17.12 PRIMARY OSTEOARTHRITIS OF LEFT KNEE: ICD-10-CM

## 2020-04-27 DIAGNOSIS — G47.09 OTHER INSOMNIA: ICD-10-CM

## 2020-04-27 RX ORDER — ZOLPIDEM TARTRATE 10 MG/1
TABLET ORAL
Qty: 30 TABLET | Refills: 0 | Status: SHIPPED | OUTPATIENT
Start: 2020-04-27 | End: 2020-06-01

## 2020-04-27 RX ORDER — NABUMETONE 750 MG/1
TABLET, FILM COATED ORAL
Qty: 60 TABLET | Refills: 0 | Status: SHIPPED | OUTPATIENT
Start: 2020-04-27 | End: 2020-06-01

## 2020-05-08 DIAGNOSIS — Z12.11 COLON CANCER SCREENING: ICD-10-CM

## 2020-07-20 ENCOUNTER — OFFICE VISIT (OUTPATIENT)
Dept: FAMILY MEDICINE | Facility: CLINIC | Age: 55
End: 2020-07-20
Payer: COMMERCIAL

## 2020-07-20 VITALS
DIASTOLIC BLOOD PRESSURE: 80 MMHG | TEMPERATURE: 98 F | HEART RATE: 78 BPM | HEIGHT: 67 IN | OXYGEN SATURATION: 99 % | SYSTOLIC BLOOD PRESSURE: 110 MMHG | WEIGHT: 159.19 LBS | BODY MASS INDEX: 24.99 KG/M2 | RESPIRATION RATE: 16 BRPM

## 2020-07-20 DIAGNOSIS — R51.9 FREQUENT HEADACHES: ICD-10-CM

## 2020-07-20 DIAGNOSIS — T75.3XXA SEA SICKNESS, INITIAL ENCOUNTER: ICD-10-CM

## 2020-07-20 DIAGNOSIS — E78.5 HYPERLIPIDEMIA, UNSPECIFIED HYPERLIPIDEMIA TYPE: Primary | ICD-10-CM

## 2020-07-20 DIAGNOSIS — G47.09 OTHER INSOMNIA: ICD-10-CM

## 2020-07-20 DIAGNOSIS — G47.00 INSOMNIA, UNSPECIFIED TYPE: ICD-10-CM

## 2020-07-20 DIAGNOSIS — G44.229 CHRONIC TENSION-TYPE HEADACHE, NOT INTRACTABLE: ICD-10-CM

## 2020-07-20 PROCEDURE — 99214 PR OFFICE/OUTPT VISIT, EST, LEVL IV, 30-39 MIN: ICD-10-PCS | Mod: S$GLB,,, | Performed by: INTERNAL MEDICINE

## 2020-07-20 PROCEDURE — 99999 PR PBB SHADOW E&M-EST. PATIENT-LVL IV: ICD-10-PCS | Mod: PBBFAC,,, | Performed by: INTERNAL MEDICINE

## 2020-07-20 PROCEDURE — 99999 PR PBB SHADOW E&M-EST. PATIENT-LVL IV: CPT | Mod: PBBFAC,,, | Performed by: INTERNAL MEDICINE

## 2020-07-20 PROCEDURE — 99214 OFFICE O/P EST MOD 30 MIN: CPT | Mod: S$GLB,,, | Performed by: INTERNAL MEDICINE

## 2020-07-20 RX ORDER — SCOLOPAMINE TRANSDERMAL SYSTEM 1 MG/1
1 PATCH, EXTENDED RELEASE TRANSDERMAL
Qty: 10 PATCH | Refills: 0 | Status: SHIPPED | OUTPATIENT
Start: 2020-07-20 | End: 2021-05-28

## 2020-07-20 RX ORDER — BUTALBITAL, ACETAMINOPHEN AND CAFFEINE 50; 325; 40 MG/1; MG/1; MG/1
1 TABLET ORAL EVERY 4 HOURS PRN
Qty: 30 TABLET | Refills: 1 | Status: SHIPPED | OUTPATIENT
Start: 2020-07-20 | End: 2021-06-23

## 2020-07-20 RX ORDER — ZOLPIDEM TARTRATE 10 MG/1
10 TABLET ORAL NIGHTLY PRN
Qty: 30 TABLET | Refills: 0 | Status: SHIPPED | OUTPATIENT
Start: 2020-07-20 | End: 2020-08-11

## 2020-07-20 NOTE — PROGRESS NOTES
[facility] Note    Chief Complaint      Chief Complaint   Patient presents with    Follow-up     6 months F/U       History of Present Illness      Sharri Hill is a 55 y.o. female who presents today for   Chief Complaint   Patient presents with    Follow-up     6 months F/U   .  Patient comes to appointment here for 6 m checkup for chronic issues as below . She is feeling great currently is eating healthy and getting regular exercise as well . She is compliant with all meds and will need full labs at next visit.  uptodate wioth colonoscopy at [facility] will get report     Problem List Items Addressed This Visit     None            Past Medical History:  Past Medical History:   Diagnosis Date    Anxiety     Esophageal reflux     Migraine     Shingles     right arm       Past Surgical History:  Past Surgical History:   Procedure Laterality Date    APPENDECTOMY       SECTION      KNEE SURGERY         Family History:  family history includes Breast cancer in her maternal aunt; Diabetes in her maternal grandfather and maternal grandmother.    Social History:  Social History     Socioeconomic History    Marital status:      Spouse name: Not on file    Number of children: Not on file    Years of education: Not on file    Highest education level: Not on file   Occupational History    Not on file   Social Needs    Financial resource strain: Not on file    Food insecurity     Worry: Not on file     Inability: Not on file    Transportation needs     Medical: Not on file     Non-medical: Not on file   Tobacco Use    Smoking status: Never Smoker    Smokeless tobacco: Never Used   Substance and Sexual Activity    Alcohol use: Yes     Comment: socially    Drug use: No    Sexual activity: Yes     Partners: Male     Birth control/protection: Partner-Vasectomy   Lifestyle    Physical activity     Days per week: Not on file     Minutes per session: Not on file     Stress: Not on file   Relationships    Social connections     Talks on phone: Not on file     Gets together: Not on file     Attends [employer] service: Not on file     Active member of club or organization: Not on file     Attends meetings of clubs or organizations: Not on file     Relationship status: Not on file   Other Topics Concern    Not on file   Social History Narrative    Not on file       Review of Systems:   Review of Systems   Constitutional: Negative for fever and weight loss.   HENT: Negative for congestion, hearing loss and sore throat.    Eyes: Negative for blurred vision.   Respiratory: Negative for cough and shortness of breath.    Cardiovascular: Negative for chest pain, palpitations, claudication and leg swelling.   Gastrointestinal: Negative for abdominal pain, constipation, diarrhea and heartburn.   Genitourinary: Negative for dysuria.   Musculoskeletal: Negative for back pain and myalgias.   Skin: Negative for rash.   Neurological: Positive for headaches. Negative for focal weakness.   Psychiatric/Behavioral: Negative for depression, memory loss and suicidal ideas. The patient has insomnia. The patient is not nervous/anxious.          Medications:  Outpatient Encounter Medications as of 7/20/2020   Medication Sig Note Dispense Refill    butalbital-acetaminophen-caffeine -40 mg (FIORICET) -40 mg per tablet Take 1 tablet by mouth every 4 (four) hours as needed for Pain.  30 tablet 1    cyclobenzaprine (FLEXERIL) 10 MG tablet  7/20/2020: Take as needed      estradiol (IMVEXXY STARTER PACK) 10 mcg InPk Insert 0.5 gms per vagina qhs x 2 weeks then twice weekly.  18 each 3    fexofenadine HCl (ALLEGRA ORAL) Take by mouth.       fluticasone propionate (FLONASE) 50 mcg/actuation nasal spray  7/20/2020: Take as needed      nabumetone (RELAFEN) 750 MG tablet TAKE ONE TABLET BY MOUTH TWICE A DAY  60 tablet 0    prasterone, dhea, (INTRAROSA) 6.5 mg Inst Place 6.5 mg vaginally every  evening.  28 each 11    RESTASIS 0.05 % ophthalmic emulsion Instill1 drop in both eyes twice a day 11/10/2016: Received from: External Pharmacy  3    RETIN-A MICRO PUMP 0.06 % GlwP THALIA AA [facility] 9/13/2019: Take as needed  2    traMADol (ULTRAM) 50 mg tablet  7/20/2020: Take as needed      zolpidem (AMBIEN) 10 mg Tab TAKE ONE TABLET BY MOUTH NIGHTLY AS NEEDED  30 tablet 0    azithromycin (Z-PRASHANT) 250 MG tablet        escitalopram oxalate (LEXAPRO) 10 MG tablet Take 1 tablet (10 mg total) by mouth once daily.  30 tablet 5    gabapentin (NEURONTIN) 300 MG capsule Take 300 mg by mouth every evening.   5    LIDOCAINE VISCOUS 2 % solution         No facility-administered encounter medications on file as of 7/20/2020.         Allergies:  Review of patient's allergies indicates:  No Known Allergies      Physical Exam      Vitals:    07/20/20 1135   BP: 110/80   Pulse: 78   Resp: 16   Temp: 97.8 °F (36.6 °C)      Body mass index is 24.93 kg/m².    Physical Exam  Constitutional:       Appearance: She is well-developed.   Eyes:      Pupils: Pupils are equal, round, and reactive to light.   Neck:      Musculoskeletal: Normal range of motion.      Thyroid: No thyromegaly.   Cardiovascular:      Rate and Rhythm: Normal rate.      Heart sounds: Normal heart sounds. No murmur. No friction rub. No gallop.    Pulmonary:      Breath sounds: Normal breath sounds.   Abdominal:      General: Bowel sounds are normal.      Palpations: Abdomen is soft. There is no hepatomegaly or splenomegaly.   Musculoskeletal: Normal range of motion.   Lymphadenopathy:      Cervical: No cervical adenopathy.   Skin:     General: Skin is warm.      Findings: No rash.   Neurological:      Mental Status: She is alert and oriented to person, place, and time.      Cranial Nerves: No cranial nerve deficit.   Psychiatric:         Behavior: Behavior normal.          Laboratory:  CBC:  No results for input(s): WBC, RBC, HGB, HCT, PLT, MCV, MCH, MCHC in the last  2160 hours.  CMP:  No results for input(s): GLU, CALCIUM, ALBUMIN, PROT, NA, K, CO2, CL, BUN, ALKPHOS, ALT, AST, BILITOT in the last 2160 hours.    Invalid input(s): CREATININ  URINALYSIS:  No results for input(s): COLORU, CLARITYU, SPECGRAV, PHUR, PROTEINUA, GLUCOSEU, BILIRUBINCON, BLOODU, WBCU, RBCU, BACTERIA, MUCUS, NITRITE, LEUKOCYTESUR, UROBILINOGEN, HYALINECASTS in the last 2160 hours.   LIPIDS:  No results for input(s): TSH, HDL, CHOL, TRIG, LDLCALC, CHOLHDL, NONHDLCHOL, TOTALCHOLEST in the last 2160 hours.  TSH:  No results for input(s): TSH in the last 2160 hours.  A1C:  No results for input(s): HGBA1C in the last 2160 hours.    Radiology:        Assessment:     Sharri Hill is a 55 y.o.female with:    There are no diagnoses linked to this encounter.      Plan:     Problem List Items Addressed This Visit     None          As above, continue current medications and maintain follow up with specialists.  Return to clinic in 6  months.      Frederick W Dantagnan [facility] [place]

## 2020-07-21 ENCOUNTER — PATIENT OUTREACH (OUTPATIENT)
Dept: ADMINISTRATIVE | Facility: HOSPITAL | Age: 55
End: 2020-07-21

## 2020-07-21 ENCOUNTER — TELEPHONE (OUTPATIENT)
Dept: ADMINISTRATIVE | Facility: HOSPITAL | Age: 55
End: 2020-07-21

## 2020-07-22 ENCOUNTER — TELEPHONE (OUTPATIENT)
Dept: ADMINISTRATIVE | Facility: HOSPITAL | Age: 55
End: 2020-07-22

## 2020-07-22 ENCOUNTER — PATIENT OUTREACH (OUTPATIENT)
Dept: ADMINISTRATIVE | Facility: HOSPITAL | Age: 55
End: 2020-07-22

## 2020-11-18 ENCOUNTER — TELEPHONE (OUTPATIENT)
Dept: OBSTETRICS AND GYNECOLOGY | Facility: CLINIC | Age: 55
End: 2020-11-18

## 2020-11-18 NOTE — TELEPHONE ENCOUNTER
----- Message from Evelina Deutsch sent at 11/18/2020  2:33 PM CST -----  Contact: [phone]/self  Type:  Sooner Apoointment Request    Caller is requesting a sooner appointment.  Caller declined first available appointment listed below.  Caller will not accept being placed on the waitlist and is requesting a message be sent to doctor.  Name of Caller:Pt  When is the first available appointment?1/06/21  Symptoms:personal matter  Would the patient rather a call back or a response via [facility]? Call back   Best Call Back Number:[phone]  Additional Information:

## 2020-12-31 ENCOUNTER — PATIENT OUTREACH (OUTPATIENT)
Dept: ADMINISTRATIVE | Facility: OTHER | Age: 55
End: 2020-12-31

## 2020-12-31 NOTE — PROGRESS NOTES
Health Maintenance Due   Topic Date Due    Hepatitis C Screening  1965    HIV Screening  07/11/1979    TETANUS VACCINE  07/11/1982    Shingles Vaccine (1 of 2) 07/11/2014    Mammogram  12/04/2019     Updates were requested from care everywhere.  Chart was reviewed for overdue [facility]active [facility] Encounters ([facility]) topics (CRS, Breast Cancer Screening, Eye exam)  Health Maintenance has been updated.  [place] immunization registry triggered.  Immunizations were reconciled.

## 2021-01-06 ENCOUNTER — OFFICE VISIT (OUTPATIENT)
Dept: OBSTETRICS AND GYNECOLOGY | Facility: CLINIC | Age: 56
End: 2021-01-06
Payer: COMMERCIAL

## 2021-01-06 VITALS
DIASTOLIC BLOOD PRESSURE: 78 MMHG | HEIGHT: 68 IN | WEIGHT: 164.81 LBS | BODY MASS INDEX: 24.98 KG/M2 | SYSTOLIC BLOOD PRESSURE: 112 MMHG

## 2021-01-06 DIAGNOSIS — M62.89 PFD (PELVIC FLOOR DYSFUNCTION): ICD-10-CM

## 2021-01-06 DIAGNOSIS — Z12.31 ENCOUNTER FOR SCREENING MAMMOGRAM FOR MALIGNANT NEOPLASM OF BREAST: Primary | ICD-10-CM

## 2021-01-06 DIAGNOSIS — N94.10 DYSPAREUNIA IN FEMALE: ICD-10-CM

## 2021-01-06 PROCEDURE — 99999 PR PBB SHADOW E&M-EST. PATIENT-LVL IV: CPT | Mod: PBBFAC,,, | Performed by: OBSTETRICS & GYNECOLOGY

## 2021-01-06 PROCEDURE — 99396 PR PREVENTIVE VISIT,EST,40-64: ICD-10-PCS | Mod: S$GLB,,, | Performed by: OBSTETRICS & GYNECOLOGY

## 2021-01-06 PROCEDURE — 99396 PREV VISIT EST AGE 40-64: CPT | Mod: S$GLB,,, | Performed by: OBSTETRICS & GYNECOLOGY

## 2021-01-06 PROCEDURE — 99999 PR PBB SHADOW E&M-EST. PATIENT-LVL IV: ICD-10-PCS | Mod: PBBFAC,,, | Performed by: OBSTETRICS & GYNECOLOGY

## 2021-01-06 RX ORDER — PRASTERONE 6.5 MG/1
6.5 INSERT VAGINAL NIGHTLY
Qty: 28 EACH | Refills: 11 | Status: SHIPPED | OUTPATIENT
Start: 2021-01-06 | End: 2022-02-01

## 2021-01-13 ENCOUNTER — CLINICAL SUPPORT (OUTPATIENT)
Dept: REHABILITATION | Facility: OTHER | Age: 56
End: 2021-01-13
Payer: COMMERCIAL

## 2021-01-13 DIAGNOSIS — R39.15 URINARY URGENCY: ICD-10-CM

## 2021-01-13 DIAGNOSIS — M62.89 PELVIC FLOOR DYSFUNCTION IN FEMALE: ICD-10-CM

## 2021-01-13 DIAGNOSIS — N39.3 SUI (STRESS URINARY INCONTINENCE, FEMALE): ICD-10-CM

## 2021-01-13 DIAGNOSIS — N94.10 DYSPAREUNIA, FEMALE: ICD-10-CM

## 2021-01-13 PROCEDURE — 97112 NEUROMUSCULAR REEDUCATION: CPT | Mod: PN

## 2021-01-13 PROCEDURE — 97161 PT EVAL LOW COMPLEX 20 MIN: CPT | Mod: PN

## 2021-01-20 ENCOUNTER — OFFICE VISIT (OUTPATIENT)
Dept: FAMILY MEDICINE | Facility: CLINIC | Age: 56
End: 2021-01-20
Payer: COMMERCIAL

## 2021-01-20 VITALS
OXYGEN SATURATION: 96 % | DIASTOLIC BLOOD PRESSURE: 82 MMHG | HEART RATE: 79 BPM | TEMPERATURE: 98 F | RESPIRATION RATE: 16 BRPM | HEIGHT: 68 IN | WEIGHT: 167.44 LBS | SYSTOLIC BLOOD PRESSURE: 116 MMHG | BODY MASS INDEX: 25.38 KG/M2

## 2021-01-20 DIAGNOSIS — Z11.4 ENCOUNTER FOR SCREENING FOR HIV: ICD-10-CM

## 2021-01-20 DIAGNOSIS — Z11.59 ENCOUNTER FOR HEPATITIS C SCREENING TEST FOR LOW RISK PATIENT: ICD-10-CM

## 2021-01-20 DIAGNOSIS — Z00.00 ANNUAL PHYSICAL EXAM: Primary | ICD-10-CM

## 2021-01-20 DIAGNOSIS — G47.09 OTHER INSOMNIA: ICD-10-CM

## 2021-01-20 PROCEDURE — 99999 PR PBB SHADOW E&M-EST. PATIENT-LVL IV: CPT | Mod: PBBFAC,,, | Performed by: INTERNAL MEDICINE

## 2021-01-20 PROCEDURE — 99999 PR PBB SHADOW E&M-EST. PATIENT-LVL IV: ICD-10-PCS | Mod: PBBFAC,,, | Performed by: INTERNAL MEDICINE

## 2021-01-20 PROCEDURE — 99396 PREV VISIT EST AGE 40-64: CPT | Mod: S$GLB,,, | Performed by: INTERNAL MEDICINE

## 2021-01-20 PROCEDURE — 99396 PR PREVENTIVE VISIT,EST,40-64: ICD-10-PCS | Mod: S$GLB,,, | Performed by: INTERNAL MEDICINE

## 2021-01-20 RX ORDER — GABAPENTIN 300 MG/1
1 CAPSULE ORAL 2 TIMES DAILY
COMMUNITY
Start: 2020-12-23 | End: 2023-12-11 | Stop reason: CLARIF

## 2021-01-20 RX ORDER — ZOLPIDEM TARTRATE 10 MG/1
10 TABLET ORAL NIGHTLY PRN
Qty: 30 TABLET | Refills: 0 | Status: SHIPPED | OUTPATIENT
Start: 2021-01-20 | End: 2021-03-04

## 2021-01-27 ENCOUNTER — CLINICAL SUPPORT (OUTPATIENT)
Dept: REHABILITATION | Facility: OTHER | Age: 56
End: 2021-01-27
Payer: COMMERCIAL

## 2021-01-27 DIAGNOSIS — R39.15 URINARY URGENCY: ICD-10-CM

## 2021-01-27 DIAGNOSIS — N94.10 DYSPAREUNIA, FEMALE: ICD-10-CM

## 2021-01-27 DIAGNOSIS — N39.3 SUI (STRESS URINARY INCONTINENCE, FEMALE): ICD-10-CM

## 2021-01-27 DIAGNOSIS — M62.89 PELVIC FLOOR DYSFUNCTION IN FEMALE: ICD-10-CM

## 2021-01-27 PROCEDURE — 97530 THERAPEUTIC ACTIVITIES: CPT | Mod: PN

## 2021-01-27 PROCEDURE — 97140 MANUAL THERAPY 1/> REGIONS: CPT | Mod: PN

## 2021-02-04 ENCOUNTER — CLINICAL SUPPORT (OUTPATIENT)
Dept: REHABILITATION | Facility: OTHER | Age: 56
End: 2021-02-04
Payer: COMMERCIAL

## 2021-02-04 DIAGNOSIS — R39.15 URINARY URGENCY: ICD-10-CM

## 2021-02-04 DIAGNOSIS — N94.10 DYSPAREUNIA, FEMALE: ICD-10-CM

## 2021-02-04 DIAGNOSIS — M62.89 PELVIC FLOOR DYSFUNCTION IN FEMALE: ICD-10-CM

## 2021-02-04 DIAGNOSIS — N39.3 SUI (STRESS URINARY INCONTINENCE, FEMALE): ICD-10-CM

## 2021-02-04 PROCEDURE — 97140 MANUAL THERAPY 1/> REGIONS: CPT | Mod: PN

## 2021-02-04 PROCEDURE — 97112 NEUROMUSCULAR REEDUCATION: CPT | Mod: PN

## 2021-02-04 PROCEDURE — 97530 THERAPEUTIC ACTIVITIES: CPT | Mod: PN

## 2021-02-09 DIAGNOSIS — Z11.59 SCREENING FOR VIRAL DISEASE: Primary | ICD-10-CM

## 2021-03-10 ENCOUNTER — CLINICAL SUPPORT (OUTPATIENT)
Dept: REHABILITATION | Facility: OTHER | Age: 56
End: 2021-03-10
Payer: COMMERCIAL

## 2021-03-10 DIAGNOSIS — N39.3 SUI (STRESS URINARY INCONTINENCE, FEMALE): ICD-10-CM

## 2021-03-10 DIAGNOSIS — R39.15 URINARY URGENCY: ICD-10-CM

## 2021-03-10 DIAGNOSIS — N94.10 DYSPAREUNIA, FEMALE: ICD-10-CM

## 2021-03-10 DIAGNOSIS — M62.89 PELVIC FLOOR DYSFUNCTION IN FEMALE: ICD-10-CM

## 2021-03-10 PROCEDURE — 97530 THERAPEUTIC ACTIVITIES: CPT | Mod: PN

## 2021-03-10 PROCEDURE — 97140 MANUAL THERAPY 1/> REGIONS: CPT | Mod: PN

## 2021-04-26 PROBLEM — Z00.00 ANNUAL PHYSICAL EXAM: Status: RESOLVED | Noted: 2019-09-13 | Resolved: 2021-04-26

## 2021-05-04 ENCOUNTER — PATIENT MESSAGE (OUTPATIENT)
Dept: RESEARCH | Facility: HOSPITAL | Age: 56
End: 2021-05-04

## 2021-06-22 ENCOUNTER — PATIENT OUTREACH (OUTPATIENT)
Dept: ADMINISTRATIVE | Facility: HOSPITAL | Age: 56
End: 2021-06-22

## 2021-06-23 ENCOUNTER — OFFICE VISIT (OUTPATIENT)
Dept: FAMILY MEDICINE | Facility: CLINIC | Age: 56
End: 2021-06-23
Payer: COMMERCIAL

## 2021-06-23 VITALS
SYSTOLIC BLOOD PRESSURE: 130 MMHG | TEMPERATURE: 98 F | HEIGHT: 68 IN | DIASTOLIC BLOOD PRESSURE: 80 MMHG | WEIGHT: 167.31 LBS | BODY MASS INDEX: 25.36 KG/M2 | OXYGEN SATURATION: 98 % | HEART RATE: 72 BPM

## 2021-06-23 DIAGNOSIS — L24.9 IRRITANT CONTACT DERMATITIS, UNSPECIFIED TRIGGER: ICD-10-CM

## 2021-06-23 DIAGNOSIS — G47.00 INSOMNIA, UNSPECIFIED TYPE: Primary | ICD-10-CM

## 2021-06-23 PROCEDURE — 99214 OFFICE O/P EST MOD 30 MIN: CPT | Mod: S$GLB,,, | Performed by: INTERNAL MEDICINE

## 2021-06-23 PROCEDURE — 99999 PR PBB SHADOW E&M-EST. PATIENT-LVL IV: CPT | Mod: PBBFAC,,, | Performed by: INTERNAL MEDICINE

## 2021-06-23 PROCEDURE — 99999 PR PBB SHADOW E&M-EST. PATIENT-LVL IV: ICD-10-PCS | Mod: PBBFAC,,, | Performed by: INTERNAL MEDICINE

## 2021-06-23 PROCEDURE — 99214 PR OFFICE/OUTPT VISIT, EST, LEVL IV, 30-39 MIN: ICD-10-PCS | Mod: S$GLB,,, | Performed by: INTERNAL MEDICINE

## 2021-06-23 RX ORDER — HYDROCODONE BITARTRATE AND ACETAMINOPHEN 7.5; 325 MG/1; MG/1
1 TABLET ORAL 2 TIMES DAILY PRN
COMMUNITY
Start: 2021-06-18

## 2021-06-23 RX ORDER — TRIAMCINOLONE ACETONIDE 1 MG/G
CREAM TOPICAL 2 TIMES DAILY
Qty: 45 G | Refills: 1 | Status: SHIPPED | OUTPATIENT
Start: 2021-06-23 | End: 2021-12-21

## 2021-06-23 RX ORDER — TRIAMCINOLONE ACETONIDE 1 MG/G
CREAM TOPICAL
COMMUNITY
Start: 2021-06-08 | End: 2021-06-23 | Stop reason: SDUPTHER

## 2021-06-23 RX ORDER — BUTALBITAL, ACETAMINOPHEN AND CAFFEINE 300; 40; 50 MG/1; MG/1; MG/1
CAPSULE ORAL
COMMUNITY
Start: 2021-06-15

## 2021-06-23 RX ORDER — RIMEGEPANT SULFATE 75 MG/75MG
75 TABLET, ORALLY DISINTEGRATING ORAL DAILY PRN
COMMUNITY
Start: 2021-05-20 | End: 2022-10-18

## 2021-06-23 RX ORDER — DICLOFENAC SODIUM 20 MG/G
SOLUTION TOPICAL
COMMUNITY
Start: 2021-06-05 | End: 2021-12-06

## 2021-06-23 RX ORDER — METHYLPREDNISOLONE 4 MG/1
TABLET ORAL
Qty: 1 PACKAGE | Refills: 0 | Status: SHIPPED | OUTPATIENT
Start: 2021-06-23 | End: 2021-07-14

## 2021-09-21 ENCOUNTER — OFFICE VISIT (OUTPATIENT)
Dept: FAMILY MEDICINE | Facility: CLINIC | Age: 56
End: 2021-09-21
Payer: COMMERCIAL

## 2021-09-21 VITALS
SYSTOLIC BLOOD PRESSURE: 127 MMHG | BODY MASS INDEX: 25.44 KG/M2 | RESPIRATION RATE: 18 BRPM | OXYGEN SATURATION: 98 % | HEIGHT: 68 IN | DIASTOLIC BLOOD PRESSURE: 85 MMHG | HEART RATE: 74 BPM | TEMPERATURE: 98 F

## 2021-09-21 DIAGNOSIS — N39.3 SUI (STRESS URINARY INCONTINENCE, FEMALE): ICD-10-CM

## 2021-09-21 DIAGNOSIS — G47.00 INSOMNIA, UNSPECIFIED TYPE: Primary | ICD-10-CM

## 2021-09-21 DIAGNOSIS — E78.5 HYPERLIPIDEMIA, UNSPECIFIED HYPERLIPIDEMIA TYPE: ICD-10-CM

## 2021-09-21 DIAGNOSIS — N94.10 DYSPAREUNIA IN FEMALE: ICD-10-CM

## 2021-09-21 DIAGNOSIS — M54.12 CERVICAL RADICULOPATHY: ICD-10-CM

## 2021-09-21 PROCEDURE — 99214 OFFICE O/P EST MOD 30 MIN: CPT | Mod: S$GLB,,, | Performed by: INTERNAL MEDICINE

## 2021-09-21 PROCEDURE — 99999 PR PBB SHADOW E&M-EST. PATIENT-LVL IV: ICD-10-PCS | Mod: PBBFAC,,, | Performed by: INTERNAL MEDICINE

## 2021-09-21 PROCEDURE — 99999 PR PBB SHADOW E&M-EST. PATIENT-LVL IV: CPT | Mod: PBBFAC,,, | Performed by: INTERNAL MEDICINE

## 2021-09-21 PROCEDURE — 99214 PR OFFICE/OUTPT VISIT, EST, LEVL IV, 30-39 MIN: ICD-10-PCS | Mod: S$GLB,,, | Performed by: INTERNAL MEDICINE

## 2021-09-21 RX ORDER — SOLIFENACIN SUCCINATE 5 MG/1
5 TABLET, FILM COATED ORAL DAILY
Qty: 30 TABLET | Refills: 11 | Status: SHIPPED | OUTPATIENT
Start: 2021-09-21 | End: 2022-02-01 | Stop reason: DRUGHIGH

## 2021-12-21 ENCOUNTER — OFFICE VISIT (OUTPATIENT)
Dept: FAMILY MEDICINE | Facility: CLINIC | Age: 56
End: 2021-12-21
Payer: COMMERCIAL

## 2021-12-21 VITALS
WEIGHT: 171.06 LBS | HEIGHT: 68 IN | HEART RATE: 68 BPM | BODY MASS INDEX: 25.92 KG/M2 | DIASTOLIC BLOOD PRESSURE: 80 MMHG | OXYGEN SATURATION: 98 % | RESPIRATION RATE: 18 BRPM | TEMPERATURE: 98 F | SYSTOLIC BLOOD PRESSURE: 128 MMHG

## 2021-12-21 DIAGNOSIS — N39.3 SUI (STRESS URINARY INCONTINENCE, FEMALE): ICD-10-CM

## 2021-12-21 DIAGNOSIS — E78.2 MIXED HYPERLIPIDEMIA: ICD-10-CM

## 2021-12-21 DIAGNOSIS — G47.00 INSOMNIA, UNSPECIFIED TYPE: Primary | ICD-10-CM

## 2021-12-21 DIAGNOSIS — F33.0 DEPRESSION, MAJOR, RECURRENT, MILD: ICD-10-CM

## 2021-12-21 DIAGNOSIS — G44.229 CHRONIC TENSION-TYPE HEADACHE, NOT INTRACTABLE: ICD-10-CM

## 2021-12-21 DIAGNOSIS — R11.0 NAUSEA: ICD-10-CM

## 2021-12-21 PROCEDURE — 99214 OFFICE O/P EST MOD 30 MIN: CPT | Mod: S$GLB,,, | Performed by: INTERNAL MEDICINE

## 2021-12-21 PROCEDURE — 99214 PR OFFICE/OUTPT VISIT, EST, LEVL IV, 30-39 MIN: ICD-10-PCS | Mod: S$GLB,,, | Performed by: INTERNAL MEDICINE

## 2021-12-21 PROCEDURE — 99999 PR PBB SHADOW E&M-EST. PATIENT-LVL IV: ICD-10-PCS | Mod: PBBFAC,,, | Performed by: INTERNAL MEDICINE

## 2021-12-21 PROCEDURE — 99999 PR PBB SHADOW E&M-EST. PATIENT-LVL IV: CPT | Mod: PBBFAC,,, | Performed by: INTERNAL MEDICINE

## 2021-12-21 RX ORDER — TIZANIDINE 4 MG/1
4 TABLET ORAL 2 TIMES DAILY
COMMUNITY
Start: 2021-12-15

## 2021-12-21 RX ORDER — ESCITALOPRAM OXALATE 10 MG/1
10 TABLET ORAL DAILY
Qty: 30 TABLET | Refills: 5 | Status: SHIPPED | OUTPATIENT
Start: 2021-12-21 | End: 2022-03-29 | Stop reason: SDUPTHER

## 2021-12-22 ENCOUNTER — TELEPHONE (OUTPATIENT)
Dept: INTERNAL MEDICINE | Facility: CLINIC | Age: 56
End: 2021-12-22
Payer: COMMERCIAL

## 2021-12-22 RX ORDER — PROMETHAZINE HYDROCHLORIDE 25 MG/1
25 TABLET ORAL EVERY 4 HOURS
Qty: 30 TABLET | Refills: 3 | Status: SHIPPED | OUTPATIENT
Start: 2021-12-22 | End: 2022-02-01

## 2021-12-26 ENCOUNTER — NURSE TRIAGE (OUTPATIENT)
Dept: ADMINISTRATIVE | Facility: CLINIC | Age: 56
End: 2021-12-26
Payer: COMMERCIAL

## 2021-12-26 ENCOUNTER — PATIENT MESSAGE (OUTPATIENT)
Dept: FAMILY MEDICINE | Facility: CLINIC | Age: 56
End: 2021-12-26
Payer: COMMERCIAL

## 2021-12-26 RX ORDER — VALACYCLOVIR HYDROCHLORIDE 1 G/1
1000 TABLET, FILM COATED ORAL 3 TIMES DAILY
Qty: 21 TABLET | Refills: 0 | Status: SHIPPED | OUTPATIENT
Start: 2021-12-26 | End: 2022-07-19

## 2022-01-04 DIAGNOSIS — M17.12 PRIMARY OSTEOARTHRITIS OF LEFT KNEE: ICD-10-CM

## 2022-01-04 RX ORDER — NABUMETONE 750 MG/1
TABLET, FILM COATED ORAL
Qty: 60 TABLET | Refills: 0 | Status: SHIPPED | OUTPATIENT
Start: 2022-01-04 | End: 2022-02-04

## 2022-01-10 ENCOUNTER — TELEPHONE (OUTPATIENT)
Dept: OBSTETRICS AND GYNECOLOGY | Facility: CLINIC | Age: 57
End: 2022-01-10
Payer: COMMERCIAL

## 2022-01-12 RX ORDER — PRASTERONE 6.5 MG/1
INSERT VAGINAL
Refills: 4 | OUTPATIENT
Start: 2022-01-12

## 2022-01-13 DIAGNOSIS — G47.09 OTHER INSOMNIA: ICD-10-CM

## 2022-01-13 RX ORDER — ZOLPIDEM TARTRATE 10 MG/1
TABLET ORAL
Qty: 30 TABLET | Refills: 2 | Status: SHIPPED | OUTPATIENT
Start: 2022-01-13 | End: 2022-03-29 | Stop reason: SDUPTHER

## 2022-01-13 NOTE — TELEPHONE ENCOUNTER
No new care gaps identified.  Powered by [email or web address]. Reference number: [id number].   1/13/2022 12:37:09 PM CST

## 2022-02-01 ENCOUNTER — OFFICE VISIT (OUTPATIENT)
Dept: OBSTETRICS AND GYNECOLOGY | Facility: CLINIC | Age: 57
End: 2022-02-01
Payer: COMMERCIAL

## 2022-02-01 VITALS
HEIGHT: 68 IN | SYSTOLIC BLOOD PRESSURE: 130 MMHG | DIASTOLIC BLOOD PRESSURE: 90 MMHG | BODY MASS INDEX: 26.6 KG/M2 | WEIGHT: 175.5 LBS

## 2022-02-01 DIAGNOSIS — N95.1 MENOPAUSAL STATE: ICD-10-CM

## 2022-02-01 DIAGNOSIS — Z11.51 ENCOUNTER FOR SCREENING FOR HUMAN PAPILLOMAVIRUS (HPV): ICD-10-CM

## 2022-02-01 DIAGNOSIS — Z12.4 PAP SMEAR FOR CERVICAL CANCER SCREENING: ICD-10-CM

## 2022-02-01 DIAGNOSIS — N39.46 MIXED INCONTINENCE URGE AND STRESS: ICD-10-CM

## 2022-02-01 DIAGNOSIS — Z01.419 ENCOUNTER FOR ANNUAL ROUTINE GYNECOLOGICAL EXAMINATION: Primary | ICD-10-CM

## 2022-02-01 DIAGNOSIS — N94.10 DYSPAREUNIA IN FEMALE: ICD-10-CM

## 2022-02-01 DIAGNOSIS — Z12.31 BREAST CANCER SCREENING BY MAMMOGRAM: ICD-10-CM

## 2022-02-01 DIAGNOSIS — N89.8 VAGINAL DISCHARGE: ICD-10-CM

## 2022-02-01 LAB
BILIRUB SERPL-MCNC: NORMAL MG/DL
BLOOD URINE, POC: NORMAL
CLARITY, POC UA: CLEAR
COLOR, POC UA: NORMAL
GLUCOSE UR QL STRIP: NORMAL
KETONES UR QL STRIP: NORMAL
LEUKOCYTE ESTERASE URINE, POC: NORMAL
NITRITE, POC UA: NORMAL
PH, POC UA: 5
PROTEIN, POC: NORMAL
SPECIFIC GRAVITY, POC UA: 1.02
UROBILINOGEN, POC UA: NORMAL

## 2022-02-01 PROCEDURE — 88175 CYTOPATH C/V AUTO FLUID REDO: CPT | Performed by: OBSTETRICS & GYNECOLOGY

## 2022-02-01 PROCEDURE — 99386 PR PREVENTIVE VISIT,NEW,40-64: ICD-10-PCS | Mod: 25,S$GLB,, | Performed by: OBSTETRICS & GYNECOLOGY

## 2022-02-01 PROCEDURE — 81002 POCT URINE DIPSTICK WITHOUT MICROSCOPE: ICD-10-PCS | Mod: S$GLB,,, | Performed by: OBSTETRICS & GYNECOLOGY

## 2022-02-01 PROCEDURE — 87624 HPV HI-RISK TYP POOLED RSLT: CPT | Performed by: OBSTETRICS & GYNECOLOGY

## 2022-02-01 PROCEDURE — 87481 CANDIDA DNA AMP PROBE: CPT | Mod: 59 | Performed by: OBSTETRICS & GYNECOLOGY

## 2022-02-01 PROCEDURE — 99386 PREV VISIT NEW AGE 40-64: CPT | Mod: 25,S$GLB,, | Performed by: OBSTETRICS & GYNECOLOGY

## 2022-02-01 PROCEDURE — 81002 URINALYSIS NONAUTO W/O SCOPE: CPT | Mod: S$GLB,,, | Performed by: OBSTETRICS & GYNECOLOGY

## 2022-02-01 PROCEDURE — 99999 PR PBB SHADOW E&M-EST. PATIENT-LVL V: ICD-10-PCS | Mod: PBBFAC,,, | Performed by: OBSTETRICS & GYNECOLOGY

## 2022-02-01 PROCEDURE — 87086 URINE CULTURE/COLONY COUNT: CPT | Performed by: OBSTETRICS & GYNECOLOGY

## 2022-02-01 PROCEDURE — 99999 PR PBB SHADOW E&M-EST. PATIENT-LVL V: CPT | Mod: PBBFAC,,, | Performed by: OBSTETRICS & GYNECOLOGY

## 2022-02-01 RX ORDER — ESTRADIOL 0.1 MG/G
1 CREAM VAGINAL DAILY
Qty: 42.5 G | Refills: 1 | Status: SHIPPED | OUTPATIENT
Start: 2022-02-01 | End: 2022-04-12 | Stop reason: SDUPTHER

## 2022-02-01 RX ORDER — SOLIFENACIN SUCCINATE 10 MG/1
10 TABLET, FILM COATED ORAL DAILY
Qty: 30 TABLET | Refills: 2 | Status: SHIPPED | OUTPATIENT
Start: 2022-02-01 | End: 2022-04-12 | Stop reason: SDUPTHER

## 2022-02-02 LAB
BACTERIA UR CULT: NO GROWTH
BACTERIAL VAGINOSIS DNA: NEGATIVE
CANDIDA GLABRATA DNA: NEGATIVE
CANDIDA KRUSEI DNA: NEGATIVE
CANDIDA RRNA VAG QL PROBE: NEGATIVE
T VAGINALIS RRNA GENITAL QL PROBE: NEGATIVE

## 2022-02-04 DIAGNOSIS — M17.12 PRIMARY OSTEOARTHRITIS OF LEFT KNEE: ICD-10-CM

## 2022-02-04 RX ORDER — NABUMETONE 750 MG/1
TABLET, FILM COATED ORAL
Qty: 60 TABLET | Refills: 0 | Status: SHIPPED | OUTPATIENT
Start: 2022-02-04 | End: 2022-03-11

## 2022-02-07 LAB
FINAL PATHOLOGIC DIAGNOSIS: NORMAL
Lab: NORMAL

## 2022-02-11 NOTE — PROGRESS NOTES
Chief Complaint: Well Woman Exam     HPI:      Sharri Hill is a 56 y.o.  who presents for annual exam. She is currently complaining of pain with intercourse and urinary incontinence.    Ms. Hill is currently sexually active with a single male partner. She declines STD screening today. Patient does not have regular monthly menses. No LMP recorded. Patient is premenopausal. She is currently using vasectomy for contraception. Menopausal complaints: pain with intercourse in past year. Menopausal since at 54. Tried Intrarosa with no improvement for 6 months and discontinued. She also reports urgency and leak with cough/sneeze. She reports urinating 20-25 times per day on average that has progressed in past year. She has not taken any new medications or changed any habits. She drinks about 1L of water a day, Spark drink in AM and occasionally a diet coke or glass of wine in PM. She was put on Vesicare 5 mg but noticed only minimal improvement. No prior urology work up.    Previous Pap: no abnormalities 2019 per patient. No records to review today.  Previous Mammogram: Scheduled with [facility] per PCP  Most Recent Dexa: not done yet  Colonoscopy: 2019 with benign polyp noted    Gardasil:Has never had     OB History        1    Para   1    Term   1            AB        Living   1       SAB        IAB        Ectopic        Multiple        Live Births   1                 GYN History  Age of Menarche:13  Age at first pregnancy:30   Age at first live birth:30  Number of months breastfeeding:    Age at Menopause:54   History of abnormal pap 30 years ago with cryotherapy, normal since that time  No other STDs    ROS:     GENERAL: Denies unintentional weight gain or weight loss. Feeling well overall.   SKIN: Denies rash or lesions.   HEENT: Denies headaches, or vision changes.   CARDIOVASCULAR: Denies palpitations or chest pain.   RESPIRATORY: Denies shortness of breath or dyspnea on exertion.  BREASTS:  "Denies pain, lumps, or nipple discharge.   ABDOMEN: Denies abdominal pain, constipation, diarrhea, nausea, vomiting, or change in appetite.   URINARY: Denies frequency, dysuria, hematuria.  NEUROLOGIC: Denies syncope or weakness.   PSYCHIATRIC: Denies depression, anxiety or mood swings.    Physical Exam:      PHYSICAL EXAM:  BP (!) 130/90   Ht 5' 8" (1.727 m)   Wt 79.6 kg (175 lb 7.8 oz)   BMI 26.68 kg/m²   Body mass index is 26.68 kg/m².     APPEARANCE: Well nourished, well developed, in no acute distress.  PSYCH: Appropriate mood and affect.  SKIN: No acne or hirsutism  NECK: Neck symmetric without masses or thyromegaly  NODES: No inguinal, axillary, or supraclavicular lymph node enlargement  CHEST: Normal respiratory effort.  ABDOMEN: Soft.  No tenderness or masses.   BREASTS: Symmetrical, no skin changes or visible lesions.  No palpable masses or nipple discharge bilaterally.  PELVIC: Normal external genitalia without lesions.  Normal hair distribution.  Adequate perineal body, normal urethral meatus. Bladder non-tender. No leak with valsalva. Vagina atrophic without lesions. White discharge noted in vault, thick.  Cervix pink, without lesions, discharge or tenderness.  No significant cystocele or rectocele but has bilateral sidewall weakness noted. Levator strength 2/5.  Bimanual exam shows uterus to be normal size, regular, mobile and nontender.  Adnexa without masses or tenderness.    EXTREMITIES: No edema.    POCT urine dipstick without microscope   Result Value Ref Range    Color, UA Dark Yellow     pH, UA 5     WBC, UA Neg     Nitrite, UA Neg     Protein, POC Neg     Glucose, UA Neg     Ketones, UA Trace     Urobilinogen, UA Neg     Bilirubin, POC Neg     Blood, UA Trace     Clarity, UA Clear     Spec Grav UA 1.020          Assessment/Plan:     Encounter for annual routine gynecological examination  Normal exam today. Pap smear done today with HPV. Mammogram ordered per PCP and planned at [facility]. " Osteoporosis prevention reviewed. Reviewed treatment options for dyspareunia secondary to vaginal atrophy. Failed Intrarosa. Will do trial of Estrace vaginal with daily use for 10-14 days then three times weekly until follow up appointment. Also discussed mixed incontinence and work up. Urine dipstick normal today and urine culture sent. Affirm collected to rule out vaginal infection causes. Starting vaginal estrogen which may also help. Will increase Vesicare dosing to 10 mg per day. Counseled on IC diet and will also give trial over next month. Other health maintenance up to date per PCP.    Pap smear for cervical cancer screening  -     Liquid-Based Pap Smear, Screening    Encounter for screening for human papillomavirus (HPV)  -     HPV High Risk Genotypes, PCR    Breast cancer screening by mammogram  -     Mammo Digital Screening Bilat w/ Kenn; Future; Expected date: 02/01/2022    Menopausal state  -     estradioL (ESTRACE) 0.01 % (0.1 mg/gram) vaginal cream; Place 1 g vaginally once daily. Use daily for 1-2 weeks then three times per week  Dispense: 42.5 g; Refill: 1    Dyspareunia in female  -     Ambulatory referral/consult to Obstetrics / Gynecology  -     estradioL (ESTRACE) 0.01 % (0.1 mg/gram) vaginal cream; Place 1 g vaginally once daily. Use daily for 1-2 weeks then three times per week  Dispense: 42.5 g; Refill: 1    Vaginal discharge  -     Vaginosis Screen by DNA Probe    Mixed incontinence urge and stress  -     Ambulatory referral/consult to Obstetrics / Gynecology  -     estradioL (ESTRACE) 0.01 % (0.1 mg/gram) vaginal cream; Place 1 g vaginally once daily. Use daily for 1-2 weeks then three times per week  Dispense: 42.5 g; Refill: 1  -     POCT urine dipstick without microscope  -     Urine culture  -     solifenacin (VESICARE) 10 MG tablet; Take 1 tablet (10 mg total) by mouth once daily.  Dispense: 30 tablet; Refill: 2      RTC 4 weeks- 6 weeks to follow up dyspareunia and mixed incontinence.  Plan Urogyn referral if not showing improvement.    Counseling:     Patient was counseled today on current ASCCP pap guidelines, the recommendation for yearly pelvic exams, healthy diet and exercise routines, breast self awareness and annual mammograms. She is to see her PCP for other health maintenance.       Use of the [employer] Patient Portal discussed and encouraged during today's visit.       Karoline Hunt MD    As of April 1, 2021, the Cures Act has been passed nationally. This new law requires that all doctors progress notes, lab results, pathology reports and radiology reports be released IMMEDIATELY to the patient in the patient portal. That means that the results are released to you at the EXACT same time they are released to me. Therefore, with all of the patients that I have I am not able to reply to each patient exactly when the results come in. So there will be a delay from when you see the results to when I see them and have time to come up with a response to send you. Also I only see these results when I am on the computer at work. So if the results come in over the weekend or after 5 pm of a work day, I will not see them until the next business day. As you can tell, this is a challenge as a physician to give every patient the quick response they hope for and deserve. So please be patient! Thanks for understanding, Dr. Hunt

## 2022-03-11 DIAGNOSIS — M17.12 PRIMARY OSTEOARTHRITIS OF LEFT KNEE: ICD-10-CM

## 2022-03-11 RX ORDER — NABUMETONE 750 MG/1
TABLET, FILM COATED ORAL
Qty: 60 TABLET | Refills: 0 | Status: SHIPPED | OUTPATIENT
Start: 2022-03-11 | End: 2022-04-14

## 2022-03-29 ENCOUNTER — OFFICE VISIT (OUTPATIENT)
Dept: INTERNAL MEDICINE | Facility: CLINIC | Age: 57
End: 2022-03-29
Payer: COMMERCIAL

## 2022-03-29 VITALS
DIASTOLIC BLOOD PRESSURE: 80 MMHG | WEIGHT: 167.88 LBS | TEMPERATURE: 99 F | RESPIRATION RATE: 18 BRPM | HEIGHT: 68 IN | SYSTOLIC BLOOD PRESSURE: 134 MMHG | OXYGEN SATURATION: 98 % | HEART RATE: 71 BPM | BODY MASS INDEX: 25.44 KG/M2

## 2022-03-29 DIAGNOSIS — M17.12 PRIMARY OSTEOARTHRITIS OF LEFT KNEE: ICD-10-CM

## 2022-03-29 DIAGNOSIS — G47.00 INSOMNIA, UNSPECIFIED TYPE: ICD-10-CM

## 2022-03-29 DIAGNOSIS — M54.12 CERVICAL RADICULOPATHY: Primary | ICD-10-CM

## 2022-03-29 DIAGNOSIS — R53.83 FATIGUE, UNSPECIFIED TYPE: ICD-10-CM

## 2022-03-29 DIAGNOSIS — E78.2 MIXED HYPERLIPIDEMIA: ICD-10-CM

## 2022-03-29 DIAGNOSIS — G47.09 OTHER INSOMNIA: ICD-10-CM

## 2022-03-29 DIAGNOSIS — N39.3 SUI (STRESS URINARY INCONTINENCE, FEMALE): ICD-10-CM

## 2022-03-29 PROCEDURE — 99214 PR OFFICE/OUTPT VISIT, EST, LEVL IV, 30-39 MIN: ICD-10-PCS | Mod: S$GLB,,, | Performed by: INTERNAL MEDICINE

## 2022-03-29 PROCEDURE — 99999 PR PBB SHADOW E&M-EST. PATIENT-LVL IV: CPT | Mod: PBBFAC,,, | Performed by: INTERNAL MEDICINE

## 2022-03-29 PROCEDURE — 99214 OFFICE O/P EST MOD 30 MIN: CPT | Mod: S$GLB,,, | Performed by: INTERNAL MEDICINE

## 2022-03-29 PROCEDURE — 99999 PR PBB SHADOW E&M-EST. PATIENT-LVL IV: ICD-10-PCS | Mod: PBBFAC,,, | Performed by: INTERNAL MEDICINE

## 2022-03-29 RX ORDER — ZOLPIDEM TARTRATE 10 MG/1
10 TABLET ORAL NIGHTLY PRN
Qty: 30 TABLET | Refills: 0 | Status: SHIPPED | OUTPATIENT
Start: 2022-03-29 | End: 2022-04-14

## 2022-03-29 RX ORDER — ESCITALOPRAM OXALATE 10 MG/1
10 TABLET ORAL DAILY
Qty: 30 TABLET | Refills: 5 | Status: SHIPPED | OUTPATIENT
Start: 2022-03-29 | End: 2023-01-10 | Stop reason: SDUPTHER

## 2022-03-29 NOTE — PROGRESS NOTES
[facility] Note    Chief Complaint      Chief Complaint   Patient presents with    Follow-up     3 m        History of Present Illness      Sharri Hill is a 56 y.o. female who presents today for   Chief Complaint   Patient presents with    Follow-up     3 m    .  Patient comes to appointment today for 3 m medication management for ambien . She is due for refill today . I have reviewed  . She is uptopdate with mammogram and is scheduled soon for colonoscopy with dr escobar .    Problem List Items Addressed This Visit        Neuro    Cervical radiculopathy - Primary    Overview     Stable continue per pain management               Cardiac/Vascular    Hyperlipidemia    Overview     Needs repeat cmp and lipid               Renal/    SABRINA (stress urinary incontinence, female)    Overview     vesicare increased to 10mg  daily   Stable dr cuellar will be seeing in 2 weeks               Other    Insomnia    Overview      reviewed cont ambien prn              Other Visit Diagnoses     Fatigue, unspecified type                Past Medical History:  Past Medical History:   Diagnosis Date    Anxiety     Esophageal reflux     Migraine     Shingles     right arm    Urge incontinence        Past Surgical History:  Past Surgical History:   Procedure Laterality Date    APPENDECTOMY       SECTION      COLONOSCOPY  [id number]    Polyp in the transverse. Repeat in 5 years    KNEE SURGERY         Family History:  family history includes Breast cancer in her maternal aunt; Coronary artery disease in her father; Diabetes in her maternal grandfather and maternal grandmother; Hyperlipidemia in her father; Hypertension in her father.    Social History:  Social History     Socioeconomic History    Marital status:    Tobacco Use    Smoking status: Never Smoker    Smokeless tobacco: Never Used   Substance and Sexual Activity    Alcohol use: Yes     Comment: socially     Drug use: No    Sexual activity: Yes     Partners: Male     Birth control/protection: Partner-Vasectomy, Post-menopausal       Review of Systems:   Review of Systems   Constitutional: Negative for fever and weight loss.   HENT: Negative for congestion, hearing loss and sore throat.    Eyes: Negative for blurred vision.   Respiratory: Negative for cough and shortness of breath.    Cardiovascular: Negative for chest pain, palpitations, claudication and leg swelling.   Gastrointestinal: Negative for abdominal pain, constipation, diarrhea and heartburn.   Genitourinary: Positive for urgency. Negative for dysuria.   Musculoskeletal: Negative for back pain and myalgias.   Skin: Negative for rash.   Neurological: Negative for focal weakness and headaches.   Psychiatric/Behavioral: Negative for depression and suicidal ideas. The patient has insomnia. The patient is not nervous/anxious.          Medications:  Outpatient Encounter Medications as of 3/29/2022   Medication Sig Note Dispense Refill    butalbital-acetaminophen-caff -40 mg Cap        EScitalopram oxalate (LEXAPRO) 10 MG tablet Take 1 tablet (10 mg total) by mouth once daily.  30 tablet 5    estradioL (ESTRACE) 0.01 % (0.1 mg/gram) vaginal cream Place 1 g vaginally once daily. Use daily for 1-2 weeks then three times per week  42.5 g 1    fexofenadine HCl (ALLEGRA ORAL) Take by mouth.       fluticasone propionate (FLONASE) 50 mcg/actuation nasal spray  7/20/2020: Take as needed      gabapentin (NEURONTIN) 300 MG capsule Take 1 capsule by mouth every evening.       HYDROcodone-acetaminophen (NORCO) 7.5-325 mg per tablet Take 1 tablet by mouth 2 (two) times daily as needed.       nabumetone (RELAFEN) 750 MG tablet TAKE ONE TABLET BY MOUTH TWICE A DAY  60 tablet 0    NURTEC 75 mg odt Take 75 mg by mouth daily as needed.       RESTASIS 0.05 % ophthalmic emulsion Instill1 drop in both eyes twice a day 11/10/2016: Received from: External Pharmacy  3     RETIN-A MICRO PUMP 0.06 % GlwP THALIA AA HS 9/13/2019: Take as needed  2    solifenacin (VESICARE) 10 MG tablet Take 1 tablet (10 mg total) by mouth once daily.  30 tablet 2    tiZANidine (ZANAFLEX) 4 MG tablet Take 4 mg by mouth 2 (two) times daily.       zolpidem (AMBIEN) 10 mg Tab TAKE ONE TABLET BY MOUTH AT BEDTIME AS NEEDED  30 tablet 2    valACYclovir (VALTREX) 1000 MG tablet Take 1 tablet (1,000 mg total) by mouth 3 (three) times daily. for 7 days  21 tablet 0     No facility-administered encounter medications on file as of 3/29/2022.        Allergies:  Review of patient's allergies indicates:  No Known Allergies      Physical Exam         Vitals:    03/29/22 1355   BP: 134/80   Pulse: 71   Resp: 18   Temp: 98.6 °F (37 °C)         Physical Exam  Constitutional:       Appearance: She is well-developed.   Eyes:      Pupils: Pupils are equal, round, and reactive to light.   Neck:      Thyroid: No thyromegaly.   Cardiovascular:      Rate and Rhythm: Normal rate.      Heart sounds: Normal heart sounds. No murmur heard.    No friction rub. No gallop.   Pulmonary:      Breath sounds: Normal breath sounds.   Abdominal:      General: Bowel sounds are normal.      Palpations: Abdomen is soft.   Musculoskeletal:         General: Normal range of motion.      Cervical back: Normal range of motion.   Lymphadenopathy:      Cervical: No cervical adenopathy.   Skin:     General: Skin is warm.      Findings: No rash.   Neurological:      Mental Status: She is alert and oriented to person, place, and time.      Cranial Nerves: No cranial nerve deficit.   Psychiatric:         Behavior: Behavior normal.          Laboratory:  CBC:  No results for input(s): WBC, RBC, HGB, HCT, PLT, MCV, MCH, MCHC in the last 2160 hours.  CMP:  No results for input(s): GLU, CALCIUM, ALBUMIN, PROT, NA, K, CO2, CL, BUN, ALKPHOS, ALT, AST, BILITOT in the last 2160 hours.    Invalid input(s): CREATININ  URINALYSIS:  Recent Labs   Lab Result Units  02/01/22  1134   Color, UA  Dark Yellow   Clarity, UA  Clear   Spec Grav UA  1.020   pH, UA  5   Nitrite, UA  Neg   Urobilinogen, UA  Neg      LIPIDS:  No results for input(s): TSH, HDL, CHOL, TRIG, LDLCALC, CHOLHDL, NONHDLCHOL, TOTALCHOLEST in the last 2160 hours.  TSH:  No results for input(s): TSH in the last 2160 hours.  A1C:  No results for input(s): HGBA1C in the last 2160 hours.    Radiology:        Assessment:     Sharri Hill is a 56 y.o.female with:    Cervical radiculopathy    SABRINA (stress urinary incontinence, female)    Insomnia, unspecified type    Mixed hyperlipidemia  -     Lipid Panel; Future; Expected date: 03/29/2022  -     Comprehensive Metabolic Panel; Future; Expected date: 03/29/2022    Fatigue, unspecified type  -     CBC Auto Differential; Future; Expected date: 03/29/2022  -     Follicle Stimulating Hormone; Future; Expected date: 03/29/2022  -     Luteinizing Hormone; Future; Expected date: 03/29/2022          Plan:     Problem List Items Addressed This Visit        Neuro    Cervical radiculopathy - Primary    Overview     Stable continue per pain management               Cardiac/Vascular    Hyperlipidemia    Overview     Needs repeat cmp and lipid               Renal/    SABRINA (stress urinary incontinence, female)    Overview     vesicare increased to 10mg  daily   Stable dr cuellar will be seeing in 2 weeks               Other    Insomnia    Overview      reviewed cont ambien prn              Other Visit Diagnoses     Fatigue, unspecified type              As above, continue current medications and maintain follow up with specialists.  Return to clinic in 3  months.      Frederick W Dantagnan [facility] [place]

## 2022-04-12 ENCOUNTER — OFFICE VISIT (OUTPATIENT)
Dept: OBSTETRICS AND GYNECOLOGY | Facility: CLINIC | Age: 57
End: 2022-04-12
Payer: COMMERCIAL

## 2022-04-12 VITALS
BODY MASS INDEX: 25.44 KG/M2 | HEIGHT: 68 IN | WEIGHT: 167.88 LBS | DIASTOLIC BLOOD PRESSURE: 85 MMHG | SYSTOLIC BLOOD PRESSURE: 122 MMHG

## 2022-04-12 DIAGNOSIS — N94.10 DYSPAREUNIA IN FEMALE: ICD-10-CM

## 2022-04-12 DIAGNOSIS — N95.1 MENOPAUSAL STATE: Primary | ICD-10-CM

## 2022-04-12 DIAGNOSIS — N39.46 MIXED INCONTINENCE URGE AND STRESS: ICD-10-CM

## 2022-04-12 PROCEDURE — 99999 PR PBB SHADOW E&M-EST. PATIENT-LVL IV: CPT | Mod: PBBFAC,,, | Performed by: OBSTETRICS & GYNECOLOGY

## 2022-04-12 PROCEDURE — 99212 PR OFFICE/OUTPT VISIT, EST, LEVL II, 10-19 MIN: ICD-10-PCS | Mod: S$GLB,,, | Performed by: OBSTETRICS & GYNECOLOGY

## 2022-04-12 PROCEDURE — 99999 PR PBB SHADOW E&M-EST. PATIENT-LVL IV: ICD-10-PCS | Mod: PBBFAC,,, | Performed by: OBSTETRICS & GYNECOLOGY

## 2022-04-12 PROCEDURE — 99212 OFFICE O/P EST SF 10 MIN: CPT | Mod: S$GLB,,, | Performed by: OBSTETRICS & GYNECOLOGY

## 2022-04-12 RX ORDER — ESTRADIOL 0.1 MG/G
1 CREAM VAGINAL DAILY
Qty: 42.5 G | Refills: 2 | Status: SHIPPED | OUTPATIENT
Start: 2022-04-12 | End: 2022-07-19 | Stop reason: SDUPTHER

## 2022-04-12 RX ORDER — LIFITEGRAST 50 MG/ML
SOLUTION/ DROPS OPHTHALMIC
COMMUNITY
Start: 2022-03-03 | End: 2023-07-13

## 2022-04-12 RX ORDER — SOLIFENACIN SUCCINATE 10 MG/1
10 TABLET, FILM COATED ORAL DAILY
Qty: 90 TABLET | Refills: 3 | Status: SHIPPED | OUTPATIENT
Start: 2022-04-12 | End: 2022-04-12 | Stop reason: CLARIF

## 2022-04-12 RX ORDER — SOLIFENACIN SUCCINATE 10 MG/1
10 TABLET, FILM COATED ORAL DAILY
Qty: 90 TABLET | Refills: 3 | Status: SHIPPED | OUTPATIENT
Start: 2022-04-12 | End: 2022-10-18

## 2022-04-12 RX ORDER — ESTRADIOL 0.1 MG/G
1 CREAM VAGINAL DAILY
Qty: 42.5 G | Refills: 2 | Status: SHIPPED | OUTPATIENT
Start: 2022-04-12 | End: 2022-04-12 | Stop reason: CLARIF

## 2022-04-13 ENCOUNTER — TELEPHONE (OUTPATIENT)
Dept: OBSTETRICS AND GYNECOLOGY | Facility: CLINIC | Age: 57
End: 2022-04-13
Payer: COMMERCIAL

## 2022-04-13 NOTE — TELEPHONE ENCOUNTER
Called pt and offered 7-19-22 @ 11:15 ([facility])  in [place] for hrt Consult , per swing    Pt agreed

## 2022-04-13 NOTE — TELEPHONE ENCOUNTER
----- Message from Tanika Hudson MD sent at 4/13/2022 12:12 PM CDT -----  Regarding: RE: Testosterone consult for low libido  Offer July 19, 11:15 double book      ----- Message -----  From: Karoline Hunt MD  Sent: 4/12/2022   3:29 PM CDT  To: Liz Campos MA, Tanika Hudson MD  Subject: Testosterone consult for low libido              Please schedule next available for testosterone consult for low libido.

## 2022-04-14 DIAGNOSIS — M17.12 PRIMARY OSTEOARTHRITIS OF LEFT KNEE: ICD-10-CM

## 2022-04-14 DIAGNOSIS — G47.09 OTHER INSOMNIA: ICD-10-CM

## 2022-04-14 RX ORDER — NABUMETONE 750 MG/1
TABLET, FILM COATED ORAL
Qty: 60 TABLET | Refills: 0 | Status: SHIPPED | OUTPATIENT
Start: 2022-04-14 | End: 2022-05-13

## 2022-04-14 RX ORDER — ZOLPIDEM TARTRATE 10 MG/1
TABLET ORAL
Qty: 30 TABLET | Refills: 0 | Status: SHIPPED | OUTPATIENT
Start: 2022-04-14 | End: 2022-05-13

## 2022-04-14 NOTE — TELEPHONE ENCOUNTER
No new care gaps identified.  Powered by [employer]. Reference number: [id number].   4/14/2022 7:38:51 AM CDT

## 2022-05-06 NOTE — PROGRESS NOTES
Subjective:       Patient ID: Sharri Hill is a 56 y.o. female.    Chief Complaint:  Follow-up (F/U Dyspareunia & incontinence )      History of Present Illness  57 yo female here for follow up of dyspareunia and mixed incontinence. At last visit in February, she was restarted on Estrace vaginal cream and counseled on interstitial cystitis diet as well as increase in Vesicare dosing. She reports only minimal improvement with these measures. She is now interested in seeing a bladder specialist. She is also interested in seeing Dr Hudson regarding low libido treatment. No new complaints.     OB History        1    Para   1    Term   1            AB        Living   1       SAB        IAB        Ectopic        Multiple        Live Births   1                 GYN History  Age of Menarche:13  Age at first pregnancy:30   Age at first live birth:30  Number of months breastfeeding:    Age at Menopause:54   Comments: History of abnormal pap 30 years ago with cryotherapy, normal since that time  No other STDs     Past Medical History:   Diagnosis Date    Anxiety     Esophageal reflux     Migraine     Shingles     right arm    Urge incontinence        Past Surgical History:   Procedure Laterality Date    APPENDECTOMY       SECTION      COLONOSCOPY  [id number]    Polyp in the transverse. Repeat in 5 years    KNEE SURGERY          Family History   Problem Relation Age of Onset    Diabetes Maternal Grandmother     Diabetes Maternal Grandfather     Breast cancer Maternal Aunt          at age 40    Hypertension Father     Hyperlipidemia Father     Coronary artery disease Father     Colon cancer Neg Hx     Ovarian cancer Neg Hx         Social History     Socioeconomic History    Marital status:    Tobacco Use    Smoking status: Never Smoker    Smokeless tobacco: Never Used   Substance and Sexual Activity    Alcohol use: Yes     Comment: socially    Drug use: No     "Sexual activity: Yes     Partners: Male     Birth control/protection: Partner-Vasectomy, Post-menopausal        Review of Systems  Review of Systems   Constitutional: Negative for chills, fever and unexpected weight change.   Respiratory: Negative for chest tightness and shortness of breath.    Cardiovascular: Negative for chest pain.   Gastrointestinal: Negative for abdominal distention, abdominal pain, constipation, diarrhea, nausea and vomiting.   Endocrine: Negative for cold intolerance and heat intolerance.   Genitourinary: Positive for dyspareunia, frequency, pelvic pain (occsaional) and urgency. Negative for vaginal bleeding and vaginal discharge.   Skin: Negative for rash.   Hematological: Does not bruise/bleed easily.   Psychiatric/Behavioral: Negative for dysphoric mood. The patient is not nervous/anxious.         Objective:     Vitals:    04/12/22 1454   BP: 122/85   Weight: 76.1 kg (167 lb 14.1 oz)   Height: 5' 8" (1.727 m)       Physical Exam:   Constitutional: She is oriented to person, place, and time. She appears well-developed and well-nourished. No distress.               Genitourinary:    Inguinal canal and rectum normal.      Pelvic exam was performed with patient supine.   The external female genitalia was normal.   No external genitalia lesions identified,Labial bartholins normal.There is no rash or lesion on the right labia. There is no rash or lesion on the left labia. Vagina exhibits no lesion. No  no vaginal discharge or bleeding in the vagina.    Genitourinary Comments: Improved vaginal estrogenization                 Neurological: She is alert and oriented to person, place, and time.     Psychiatric: She has a normal mood and affect.        Assessment/ Plan:     Orders Placed This Encounter    Ambulatory referral/consult to Urogynecology    estradioL (ESTRACE) 0.01 % (0.1 mg/gram) vaginal cream    solifenacin (VESICARE) 10 MG tablet       Sharri was seen today for " follow-up.    Diagnoses and all orders for this visit:    Menopausal state  -     estradioL (ESTRACE) 0.01 % (0.1 mg/gram) vaginal cream; Place 1 g vaginally once daily. Use every other day for next 1-2 months    Mixed incontinence urge and stress  -     Ambulatory referral/consult to Urogynecology; Future  -     estradioL (ESTRACE) 0.01 % (0.1 mg/gram) vaginal cream; Place 1 g vaginally once daily. Use every other day for next 1-2 months  -     solifenacin (VESICARE) 10 MG tablet; Take 1 tablet (10 mg total) by mouth once daily.    Dyspareunia in female  -     estradioL (ESTRACE) 0.01 % (0.1 mg/gram) vaginal cream; Place 1 g vaginally once daily. Use every other day for next 1-2 months    Discussed symptoms. Recommend to continue Estrace three times per week and Vesicare as urgency has improved some. Will refer to Urogynecology for further evaluation/treatment. Will also refer to Dr. Hudson to discuss testosterone therapy for low libido.    RTC for next annual exam or other concerns.    As of April 1, 2021, the Cures Act has been passed nationally. This new law requires that all doctors progress notes, lab results, pathology reports and radiology reports be released IMMEDIATELY to the patient in the patient portal. That means that the results are released to you at the EXACT same time they are released to me. Therefore, with all of the patients that I have I am not able to reply to each patient exactly when the results come in. So there will be a delay from when you see the results to when I see them and have time to come up with a response to send you. Also I only see these results when I am on the computer at work. So if the results come in over the weekend or after 5 pm of a work day, I will not see them until the next business day. As you can tell, this is a challenge as a physician to give every patient the quick response they hope for and deserve. So please be patient!   Thanks for your understanding and  patience.

## 2022-05-13 DIAGNOSIS — G47.09 OTHER INSOMNIA: ICD-10-CM

## 2022-05-13 DIAGNOSIS — M17.12 PRIMARY OSTEOARTHRITIS OF LEFT KNEE: ICD-10-CM

## 2022-05-13 RX ORDER — NABUMETONE 750 MG/1
TABLET, FILM COATED ORAL
Qty: 60 TABLET | Refills: 0 | Status: SHIPPED | OUTPATIENT
Start: 2022-05-13 | End: 2022-06-10

## 2022-05-13 RX ORDER — ZOLPIDEM TARTRATE 10 MG/1
TABLET ORAL
Qty: 30 TABLET | Refills: 0 | Status: SHIPPED | OUTPATIENT
Start: 2022-05-13 | End: 2022-06-10

## 2022-05-13 NOTE — TELEPHONE ENCOUNTER
No new care gaps identified.  [facility] Embedded Care Gaps. Reference number: [id number]. 5/13/2022   10:15:53 AM MICHOACANOT

## 2022-06-10 DIAGNOSIS — G47.09 OTHER INSOMNIA: ICD-10-CM

## 2022-06-10 DIAGNOSIS — M17.12 PRIMARY OSTEOARTHRITIS OF LEFT KNEE: ICD-10-CM

## 2022-06-10 RX ORDER — ZOLPIDEM TARTRATE 10 MG/1
TABLET ORAL
Qty: 30 TABLET | Refills: 0 | Status: SHIPPED | OUTPATIENT
Start: 2022-06-10 | End: 2022-07-08

## 2022-06-10 RX ORDER — NABUMETONE 750 MG/1
TABLET, FILM COATED ORAL
Qty: 60 TABLET | Refills: 0 | Status: SHIPPED | OUTPATIENT
Start: 2022-06-10 | End: 2022-07-08

## 2022-06-10 NOTE — TELEPHONE ENCOUNTER
No new care gaps identified.  [facility] Embedded Care Gaps. Reference number: [id number]. 6/10/2022   10:04:58 AM MICHOACANOT

## 2022-06-28 ENCOUNTER — OFFICE VISIT (OUTPATIENT)
Dept: INTERNAL MEDICINE | Facility: CLINIC | Age: 57
End: 2022-06-28
Payer: COMMERCIAL

## 2022-06-28 VITALS
DIASTOLIC BLOOD PRESSURE: 80 MMHG | TEMPERATURE: 99 F | BODY MASS INDEX: 25.7 KG/M2 | RESPIRATION RATE: 18 BRPM | OXYGEN SATURATION: 97 % | HEART RATE: 67 BPM | SYSTOLIC BLOOD PRESSURE: 120 MMHG | HEIGHT: 68 IN | WEIGHT: 169.56 LBS

## 2022-06-28 DIAGNOSIS — F33.0 DEPRESSION, MAJOR, RECURRENT, MILD: ICD-10-CM

## 2022-06-28 DIAGNOSIS — N39.3 SUI (STRESS URINARY INCONTINENCE, FEMALE): ICD-10-CM

## 2022-06-28 DIAGNOSIS — E78.2 MIXED HYPERLIPIDEMIA: ICD-10-CM

## 2022-06-28 DIAGNOSIS — G47.00 INSOMNIA, UNSPECIFIED TYPE: Primary | ICD-10-CM

## 2022-06-28 DIAGNOSIS — M54.12 CERVICAL RADICULOPATHY: ICD-10-CM

## 2022-06-28 PROCEDURE — 99214 OFFICE O/P EST MOD 30 MIN: CPT | Mod: S$GLB,,, | Performed by: INTERNAL MEDICINE

## 2022-06-28 PROCEDURE — 99999 PR PBB SHADOW E&M-EST. PATIENT-LVL IV: CPT | Mod: PBBFAC,,, | Performed by: INTERNAL MEDICINE

## 2022-06-28 PROCEDURE — 99999 PR PBB SHADOW E&M-EST. PATIENT-LVL IV: ICD-10-PCS | Mod: PBBFAC,,, | Performed by: INTERNAL MEDICINE

## 2022-06-28 PROCEDURE — 99214 PR OFFICE/OUTPT VISIT, EST, LEVL IV, 30-39 MIN: ICD-10-PCS | Mod: S$GLB,,, | Performed by: INTERNAL MEDICINE

## 2022-06-28 RX ORDER — PANTOPRAZOLE SODIUM 40 MG
40 TABLET, DELAYED RELEASE (ENTERIC COATED) ORAL EVERY MORNING
COMMUNITY
Start: 2022-05-31

## 2022-06-28 NOTE — PROGRESS NOTES
[facility] Note    Chief Complaint      Chief Complaint   Patient presents with    Follow-up     3M       History of Present Illness      Sharri Hill is a 56 y.o. female who presents today for   Chief Complaint   Patient presents with    Follow-up     3M   .  Patient comes to appointment here for 3 m checkup for med management for insomnia . She is table on current regimen . She is dealing with sabrina will be seeing specialist soon . She is scheduled for colonoscopy/egd with dr escobar next month . She has been started on Protonix by him for daily reflux    Problem List Items Addressed This Visit        Neuro    Cervical radiculopathy    Overview     Stable continue per pain management               Psychiatric    Depression, major, recurrent, mild    Overview     Cont lexapro is feeling better . Stable               Cardiac/Vascular    Hyperlipidemia    Overview     Diet only cont same               Renal/    SABRINA (stress urinary incontinence, female)    Overview     vesicare increased to 10mg  daily has been referred to specialist by dr cassidy               Other    Insomnia - Primary    Overview      reviewed cont ambien prn                    Past Medical History:  Past Medical History:   Diagnosis Date    Anxiety     Esophageal reflux     Migraine     Shingles     right arm    Urge incontinence        Past Surgical History:  Past Surgical History:   Procedure Laterality Date    APPENDECTOMY       SECTION      COLONOSCOPY  [id number]    Polyp in the transverse. Repeat in 5 years    KNEE SURGERY         Family History:  family history includes Breast cancer in her maternal aunt; Coronary artery disease in her father; Diabetes in her maternal grandfather and maternal grandmother; Hyperlipidemia in her father; Hypertension in her father.    Social History:  Social History     Socioeconomic History    Marital status:    Tobacco Use    Smoking status:  Never Smoker    Smokeless tobacco: Never Used   Substance and Sexual Activity    Alcohol use: Yes     Comment: socially    Drug use: No    Sexual activity: Yes     Partners: Male     Birth control/protection: Partner-Vasectomy, Post-menopausal       Review of Systems:   Review of Systems   Constitutional: Negative for fever and weight loss.   HENT: Negative for congestion, hearing loss and sore throat.    Eyes: Negative for blurred vision.   Respiratory: Negative for cough and shortness of breath.    Cardiovascular: Negative for chest pain, palpitations, claudication and leg swelling.   Gastrointestinal: Negative for abdominal pain, constipation, diarrhea and heartburn.   Genitourinary: Positive for frequency. Negative for dysuria.   Musculoskeletal: Negative for back pain and myalgias.   Skin: Negative for rash.   Neurological: Negative for focal weakness and headaches.   Psychiatric/Behavioral: Negative for depression, memory loss and suicidal ideas. The patient has insomnia. The patient is not nervous/anxious.          Medications:  Outpatient Encounter Medications as of 6/28/2022   Medication Sig Note Dispense Refill    butalbital-acetaminophen-caff -40 mg Cap        EScitalopram oxalate (LEXAPRO) 10 MG tablet Take 1 tablet (10 mg total) by mouth once daily.  30 tablet 5    estradioL (ESTRACE) 0.01 % (0.1 mg/gram) vaginal cream Place 1 g vaginally once daily. Use every other day for next 1-2 months  42.5 g 2    fexofenadine HCl (ALLEGRA ORAL) Take by mouth.       fluticasone propionate (FLONASE) 50 mcg/actuation nasal spray  7/20/2020: Take as needed      gabapentin (NEURONTIN) 300 MG capsule Take 1 capsule by mouth 2 (two) times daily.       HYDROcodone-acetaminophen (NORCO) 7.5-325 mg per tablet Take 1 tablet by mouth 2 (two) times daily as needed.       nabumetone (RELAFEN) 750 MG tablet TAKE ONE TABLET BY MOUTH TWICE A DAY  60 tablet 0    NURTEC 75 mg odt Take 75 mg by mouth daily as  needed.       PROTONIX 40 mg tablet Take 40 mg by mouth every morning.       solifenacin (VESICARE) 10 MG tablet Take 1 tablet (10 mg total) by mouth once daily.  90 tablet 3    tiZANidine (ZANAFLEX) 4 MG tablet Take 4 mg by mouth 2 (two) times daily.       XIIDRA 5 % Dpet        zolpidem (AMBIEN) 10 mg Tab TAKE ONE TABLET BY MOUTH AT BEDTIME AS NEEDED  30 tablet 0    valACYclovir (VALTREX) 1000 MG tablet Take 1 tablet (1,000 mg total) by mouth 3 (three) times daily. for 7 days  21 tablet 0    [DISCONTINUED] RESTASIS 0.05 % ophthalmic emulsion Instill1 drop in both eyes twice a day 11/10/2016: Received from: External Pharmacy  3    [DISCONTINUED] RETIN-A MICRO PUMP 0.06 % [facility] 9/13/2019: Take as needed  2     No facility-administered encounter medications on file as of 6/28/2022.        Allergies:  Review of patient's allergies indicates:  No Known Allergies      Physical Exam         Vitals:    06/28/22 1355   BP: 120/80   Pulse: 67   Resp: 18   Temp: 98.6 °F (37 °C)         Physical Exam  Constitutional:       Appearance: She is well-developed.   Eyes:      Pupils: Pupils are equal, round, and reactive to light.   Neck:      Thyroid: No thyromegaly.   Cardiovascular:      Rate and Rhythm: Normal rate.      Heart sounds: Normal heart sounds. No murmur heard.    No friction rub. No gallop.   Pulmonary:      Breath sounds: Normal breath sounds.   Abdominal:      General: Bowel sounds are normal.      Palpations: Abdomen is soft.   Musculoskeletal:         General: Normal range of motion.      Cervical back: Normal range of motion.   Lymphadenopathy:      Cervical: No cervical adenopathy.   Skin:     General: Skin is warm.      Findings: No rash.   Neurological:      Mental Status: She is alert and oriented to person, place, and time.      Cranial Nerves: No cranial nerve deficit.   Psychiatric:         Behavior: Behavior normal.          Laboratory:  CBC:  No results for input(s): WBC, RBC, HGB,  HCT, PLT, MCV, MCH, MCHC in the last 2160 hours.  CMP:  No results for input(s): GLU, CALCIUM, ALBUMIN, PROT, NA, K, CO2, CL, BUN, ALKPHOS, ALT, AST, BILITOT in the last 2160 hours.    Invalid input(s): CREATININ  URINALYSIS:  No results for input(s): COLORU, CLARITYU, SPECGRAV, PHUR, PROTEINUA, GLUCOSEU, BILIRUBINCON, BLOODU, WBCU, RBCU, BACTERIA, MUCUS, NITRITE, LEUKOCYTESUR, UROBILINOGEN, HYALINECASTS in the last 2160 hours.   LIPIDS:  No results for input(s): TSH, HDL, CHOL, TRIG, LDLCALC, CHOLHDL, NONHDLCHOL, TOTALCHOLEST in the last 2160 hours.  TSH:  No results for input(s): TSH in the last 2160 hours.  A1C:  No results for input(s): HGBA1C in the last 2160 hours.    Radiology:        Assessment:     Sharri Hill is a 56 y.o.female with:    Insomnia, unspecified type    Depression, major, recurrent, mild    Mixed hyperlipidemia    SABRINA (stress urinary incontinence, female)    Cervical radiculopathy          Plan:     Problem List Items Addressed This Visit        Neuro    Cervical radiculopathy    Overview     Stable continue per pain management               Psychiatric    Depression, major, recurrent, mild    Overview     Cont lexapro is feeling better . Stable               Cardiac/Vascular    Hyperlipidemia    Overview     Diet only cont same               Renal/    SABRINA (stress urinary incontinence, female)    Overview     vesicare increased to 10mg  daily has been referred to specialist by dr khanh Morfin    Insomnia - Primary    Overview      reviewed cont ambien prn                  As above, continue current medications and maintain follow up with specialists.  Return to clinic in 3 months.      Frederick W Dantagnan [facility] [place]

## 2022-07-07 DIAGNOSIS — G47.09 OTHER INSOMNIA: ICD-10-CM

## 2022-07-07 DIAGNOSIS — M17.12 PRIMARY OSTEOARTHRITIS OF LEFT KNEE: ICD-10-CM

## 2022-07-08 RX ORDER — NABUMETONE 750 MG/1
TABLET, FILM COATED ORAL
Qty: 60 TABLET | Refills: 0 | Status: SHIPPED | OUTPATIENT
Start: 2022-07-08 | End: 2022-08-15

## 2022-07-08 RX ORDER — ZOLPIDEM TARTRATE 10 MG/1
TABLET ORAL
Qty: 30 TABLET | Refills: 0 | Status: SHIPPED | OUTPATIENT
Start: 2022-07-08 | End: 2022-08-05

## 2022-07-08 NOTE — TELEPHONE ENCOUNTER
No new care gaps identified.  [facility] Embedded Care Gaps. Reference number: [id number]. 7/07/2022   8:58:03 PM CDT

## 2022-07-19 ENCOUNTER — LAB VISIT (OUTPATIENT)
Dept: LAB | Facility: HOSPITAL | Age: 57
End: 2022-07-19
Attending: OBSTETRICS & GYNECOLOGY
Payer: COMMERCIAL

## 2022-07-19 ENCOUNTER — OFFICE VISIT (OUTPATIENT)
Dept: OBSTETRICS AND GYNECOLOGY | Facility: CLINIC | Age: 57
End: 2022-07-19
Attending: OBSTETRICS & GYNECOLOGY
Payer: COMMERCIAL

## 2022-07-19 VITALS
BODY MASS INDEX: 25.73 KG/M2 | DIASTOLIC BLOOD PRESSURE: 80 MMHG | SYSTOLIC BLOOD PRESSURE: 130 MMHG | HEIGHT: 68 IN | WEIGHT: 169.75 LBS

## 2022-07-19 DIAGNOSIS — N39.46 MIXED INCONTINENCE URGE AND STRESS: ICD-10-CM

## 2022-07-19 DIAGNOSIS — Z00.00 PERIODIC HEALTH ASSESSMENT, GENERAL SCREENING, ADULT: ICD-10-CM

## 2022-07-19 DIAGNOSIS — N94.10 DYSPAREUNIA IN FEMALE: ICD-10-CM

## 2022-07-19 DIAGNOSIS — N95.1 MENOPAUSAL STATE: ICD-10-CM

## 2022-07-19 DIAGNOSIS — N95.1 SYMPTOMATIC MENOPAUSAL OR FEMALE CLIMACTERIC STATES: ICD-10-CM

## 2022-07-19 DIAGNOSIS — Z13.21 ENCOUNTER FOR VITAMIN DEFICIENCY SCREENING: ICD-10-CM

## 2022-07-19 DIAGNOSIS — N95.1 SYMPTOMATIC MENOPAUSAL OR FEMALE CLIMACTERIC STATES: Primary | ICD-10-CM

## 2022-07-19 LAB
25(OH)D3+25(OH)D2 SERPL-MCNC: 47 NG/ML (ref 30–96)
DHEA-S SERPL-MCNC: 64.5 UG/DL (ref 29.7–182.2)
ESTRADIOL SERPL-MCNC: 50 PG/ML
PROGEST SERPL-MCNC: 0.1 NG/ML
TESTOST SERPL-MCNC: 17 NG/DL (ref 5–73)
TSH SERPL DL<=0.005 MIU/L-ACNC: 1.21 UIU/ML (ref 0.4–4)
VIT B12 SERPL-MCNC: 556 PG/ML (ref 210–950)

## 2022-07-19 PROCEDURE — 84402 ASSAY OF FREE TESTOSTERONE: CPT | Performed by: OBSTETRICS & GYNECOLOGY

## 2022-07-19 PROCEDURE — 82306 VITAMIN D 25 HYDROXY: CPT | Performed by: OBSTETRICS & GYNECOLOGY

## 2022-07-19 PROCEDURE — 84144 ASSAY OF PROGESTERONE: CPT | Performed by: OBSTETRICS & GYNECOLOGY

## 2022-07-19 PROCEDURE — 99214 OFFICE O/P EST MOD 30 MIN: CPT | Mod: S$GLB,,, | Performed by: OBSTETRICS & GYNECOLOGY

## 2022-07-19 PROCEDURE — 99999 PR PBB SHADOW E&M-EST. PATIENT-LVL IV: ICD-10-PCS | Mod: PBBFAC,,, | Performed by: OBSTETRICS & GYNECOLOGY

## 2022-07-19 PROCEDURE — 82627 DEHYDROEPIANDROSTERONE: CPT | Performed by: OBSTETRICS & GYNECOLOGY

## 2022-07-19 PROCEDURE — 82607 VITAMIN B-12: CPT | Performed by: OBSTETRICS & GYNECOLOGY

## 2022-07-19 PROCEDURE — 84443 ASSAY THYROID STIM HORMONE: CPT | Performed by: OBSTETRICS & GYNECOLOGY

## 2022-07-19 PROCEDURE — 84403 ASSAY OF TOTAL TESTOSTERONE: CPT | Performed by: OBSTETRICS & GYNECOLOGY

## 2022-07-19 PROCEDURE — 99214 PR OFFICE/OUTPT VISIT, EST, LEVL IV, 30-39 MIN: ICD-10-PCS | Mod: S$GLB,,, | Performed by: OBSTETRICS & GYNECOLOGY

## 2022-07-19 PROCEDURE — 99999 PR PBB SHADOW E&M-EST. PATIENT-LVL IV: CPT | Mod: PBBFAC,,, | Performed by: OBSTETRICS & GYNECOLOGY

## 2022-07-19 PROCEDURE — 82670 ASSAY OF TOTAL ESTRADIOL: CPT | Performed by: OBSTETRICS & GYNECOLOGY

## 2022-07-19 RX ORDER — ESTRADIOL 1 MG/1
1 TABLET ORAL EVERY MORNING
Qty: 90 TABLET | Refills: 3 | Status: SHIPPED | OUTPATIENT
Start: 2022-07-19 | End: 2022-11-08 | Stop reason: SDUPTHER

## 2022-07-19 RX ORDER — PROGESTERONE 100 MG/1
CAPSULE ORAL
Qty: 90 CAPSULE | Refills: 3 | Status: SHIPPED | OUTPATIENT
Start: 2022-07-19 | End: 2022-11-08 | Stop reason: SDUPTHER

## 2022-07-19 RX ORDER — SOD SULF/POT CHLORIDE/MAG SULF 1.479 G
TABLET ORAL
COMMUNITY
Start: 2022-05-31 | End: 2022-10-18

## 2022-07-19 RX ORDER — ESTRADIOL 0.1 MG/G
1 CREAM VAGINAL DAILY
Qty: 42.5 G | Refills: 2 | Status: SHIPPED | OUTPATIENT
Start: 2022-07-19 | End: 2022-11-15 | Stop reason: SDUPTHER

## 2022-07-19 NOTE — PROGRESS NOTES
Subjective:      Sharri Hill is a 57 y.o. female who presents to discuss hormone replacement therapy.  Menarche occurred at age 13 and the patient went into menopause at 52 years of age, which was 5 years ago. Patient is requesting hormone replacement therapy due to hot flashes, moodiness, vaginal dryness, anxiety, decreased libido (mainly due to dyspareunia), insomnia, hair loss, palpitations, and joint pain.  The patient is using estradiol vaginal cream for 6 months.  Patient denies post-menopausal vaginal bleeding. The patient is sexually active.  She denies the following contraindications to HRT:  Vaginal bleeding, history of VTE/PE, thrombophilia,  breast cancer, or active liver disease.       PCP: Dr. Bose       Routine labs: 3/30/22  Pap smear: 2/7/2022 Normal HPV negative  Mammogram: 2/23/22 Birads 1  DEXA: No  Colonoscopy: 2016 Normal    No visits with results within 3 Month(s) from this visit.   Latest known visit with results is:   Lab Visit on 03/30/2022   Component Date Value Ref Range Status    WBC 03/30/2022 3.73 (A) 3.90 - 12.70 K/uL Final    RBC 03/30/2022 3.98 (A) 4.00 - 5.40 M/uL Final    Hemoglobin 03/30/2022 13.6  12.0 - 16.0 g/dL Final    Hematocrit 03/30/2022 40.9  37.0 - 48.5 % Final    MCV 03/30/2022 103 (A) 82 - 98 fL Final    MCH 03/30/2022 34.2 (A) 27.0 - 31.0 pg Final    MCHC 03/30/2022 33.3  32.0 - 36.0 g/dL Final    RDW 03/30/2022 12.0  11.5 - 14.5 % Final    Platelets 03/30/2022 206  150 - 450 K/uL Final    MPV 03/30/2022 10.8  9.2 - 12.9 fL Final    Immature Granulocytes 03/30/2022 0.3  0.0 - 0.5 % Final    Gran # (ANC) 03/30/2022 2.2  1.8 - 7.7 K/uL Final    Immature Grans (Abs) 03/30/2022 0.01  0.00 - 0.04 K/uL Final    Lymph # 03/30/2022 1.1  1.0 - 4.8 K/uL Final    Mono # 03/30/2022 0.3  0.3 - 1.0 K/uL Final    Eos # 03/30/2022 0.1  0.0 - 0.5 K/uL Final    Baso # 03/30/2022 0.04  0.00 - 0.20 K/uL Final    nRBC 03/30/2022 0  0 /100 WBC Final    Gran %  03/30/2022 58.9  38.0 - 73.0 % Final    Lymph % 03/30/2022 28.4  18.0 - 48.0 % Final    Mono % 03/30/2022 8.6  4.0 - 15.0 % Final    Eosinophil % 03/30/2022 2.7  0.0 - 8.0 % Final    Basophil % 03/30/2022 1.1  0.0 - 1.9 % Final    Differential Method 03/30/2022 Automated   Final    Cholesterol 03/30/2022 256 (A) 120 - 199 mg/dL Final    Triglycerides 03/30/2022 61  30 - 150 mg/dL Final    HDL 03/30/2022 89 (A) 40 - 75 mg/dL Final    LDL Cholesterol 03/30/2022 154.8  63.0 - 159.0 mg/dL Final    HDL/Cholesterol Ratio 03/30/2022 34.8  20.0 - 50.0 % Final    Total Cholesterol/HDL Ratio 03/30/2022 2.9  2.0 - 5.0 Final    Non-HDL Cholesterol 03/30/2022 167  mg/dL Final    Sodium 03/30/2022 143  136 - 145 mmol/L Final    Potassium 03/30/2022 4.0  3.5 - 5.1 mmol/L Final    Chloride 03/30/2022 108  95 - 110 mmol/L Final    CO2 03/30/2022 24  23 - 29 mmol/L Final    Glucose 03/30/2022 101  70 - 110 mg/dL Final    BUN 03/30/2022 16  7 - 17 mg/dL Final    Creatinine 03/30/2022 0.70  0.50 - 1.40 mg/dL Final    Calcium 03/30/2022 9.2  8.7 - 10.5 mg/dL Final    Total Protein 03/30/2022 7.7  6.0 - 8.4 g/dL Final    Albumin 03/30/2022 4.4  3.5 - 5.2 g/dL Final    Total Bilirubin 03/30/2022 0.6  0.1 - 1.0 mg/dL Final    Alkaline Phosphatase 03/30/2022 74  38 - 126 U/L Final    AST 03/30/2022 30  15 - 46 U/L Final    ALT 03/30/2022 23  10 - 44 U/L Final    Anion Gap 03/30/2022 11  8 - 16 mmol/L Final    eGFR if African American 03/30/2022 >60.0  >60 mL/min/1.73 m^2 Final    eGFR if non African American 03/30/2022 >60.0  >60 mL/min/1.73 m^2 Final    Follicle Stimulating Hormone 03/30/2022 81.63  See Text mIU/mL Final    LH 03/30/2022 29.3  See Text mIU/mL Final       Past Medical History:   Diagnosis Date    Abnormal Pap smear of cervix 1992    Cryo Done ([place])     Anxiety     Esophageal reflux     History of shingles 2012, 2021    Right arm 2012  and  Left hip 2021    Menopause 2018    Migraine      Urge incontinence      Past Surgical History:   Procedure Laterality Date    APPENDECTOMY       SECTION      COLONOSCOPY  [id number]    Polyp in the transverse. Repeat in 5 years    KNEE SURGERY       Social History     Tobacco Use    Smoking status: Never Smoker    Smokeless tobacco: Never Used   Substance Use Topics    Alcohol use: Yes     Comment: socially    Drug use: No     Family History   Problem Relation Age of Onset    Diabetes Maternal Grandmother     Diabetes Maternal Grandfather     Breast cancer Maternal Aunt          at age 40    Hypertension Father     Hyperlipidemia Father     Coronary artery disease Father     Depression Mother     Colon cancer Neg Hx     Ovarian cancer Neg Hx      OB History    Para Term  AB Living   1 1 1     1   SAB IAB Ectopic Multiple Live Births           1      # Outcome Date GA Lbr Dylan/2nd Weight Sex Delivery Anes PTL Lv   1 Term 95 40w0d  2.495 kg (5 lb 8 oz) F CS-Unspec   EDYTA       Current Outpatient Medications:     EScitalopram oxalate (LEXAPRO) 10 MG tablet, Take 1 tablet (10 mg total) by mouth once daily., Disp: 30 tablet, Rfl: 5    fexofenadine HCl (ALLEGRA ORAL), Take by mouth., Disp: , Rfl:     fluticasone propionate (FLONASE) 50 mcg/actuation nasal spray, , Disp: , Rfl:     gabapentin (NEURONTIN) 300 MG capsule, Take 1 capsule by mouth 2 (two) times daily., Disp: , Rfl:     HYDROcodone-acetaminophen (NORCO) 7.5-325 mg per tablet, Take 1 tablet by mouth 2 (two) times daily as needed., Disp: , Rfl:     nabumetone (RELAFEN) 750 MG tablet, TAKE ONE TABLET BY MOUTH TWICE A DAY, Disp: 60 tablet, Rfl: 0    solifenacin (VESICARE) 10 MG tablet, Take 1 tablet (10 mg total) by mouth once daily., Disp: 90 tablet, Rfl: 3    SUTAB 1.479-0.188- 0.225 gram tablet, SMARTSI Tablet(s) By Mouth As Directed, Disp: , Rfl:     tiZANidine (ZANAFLEX) 4 MG tablet, Take 4 mg by mouth 2 (two) times daily., Disp: ,  "Rfl:     butalbital-acetaminophen-caff -40 mg Cap, , Disp: , Rfl:     estradioL (ESTRACE) 0.01 % (0.1 mg/gram) vaginal cream, Place 1 g vaginally once daily. Use every other day for next 1-2 months, Disp: 42.5 g, Rfl: 2    estradioL (ESTRACE) 1 MG tablet, Take 1 tablet (1 mg total) by mouth every morning., Disp: 90 tablet, Rfl: 3    NURTEC 75 mg odt, Take 75 mg by mouth daily as needed., Disp: , Rfl:     progesterone (PROMETRIUM) 100 MG capsule, Take 1 capsule by mouth 30-60 minutes before bed every night, Disp: 90 capsule, Rfl: 3    PROTONIX 40 mg tablet, Take 40 mg by mouth every morning., Disp: , Rfl:     XIIDRA 5 % Dpet, , Disp: , Rfl:     zolpidem (AMBIEN) 10 mg Tab, TAKE ONE TABLET BY MOUTH AT BEDTIME AS NEEDED, Disp: 30 tablet, Rfl: 0    Vitals:    07/19/22 1137   BP: 130/80   Weight: 77 kg (169 lb 12.1 oz)   Height: 5' 8" (1.727 m)   PainSc: 0-No pain     Body mass index is 25.81 kg/m².    Assessment:    Symptomatic menopausal or female climacteric states  -     DHEA-Sulfate; Future; Expected date: 07/19/2022  -     Estradiol; Future; Expected date: 07/19/2022  -     Progesterone; Future; Expected date: 07/19/2022  -     Testosterone; Future; Expected date: 07/19/2022  -     Testosterone, Free; Future; Expected date: 07/19/2022  -     DXA Bone Density Spine And Hip; Future; Expected date: 07/19/2022  -     estradioL (ESTRACE) 1 MG tablet; Take 1 tablet (1 mg total) by mouth every morning.  Dispense: 90 tablet; Refill: 3  -     progesterone (PROMETRIUM) 100 MG capsule; Take 1 capsule by mouth 30-60 minutes before bed every night  Dispense: 90 capsule; Refill: 3    Periodic health assessment, general screening, adult  -     TSH; Future; Expected date: 07/19/2022    Encounter for vitamin deficiency screening  -     Vitamin B12; Future; Expected date: 07/19/2022  -     Vitamin D; Future; Expected date: 07/19/2022    Menopausal state  -     estradioL (ESTRACE) 0.01 % (0.1 mg/gram) vaginal cream; " Place 1 g vaginally once daily. Use every other day for next 1-2 months  Dispense: 42.5 g; Refill: 2    Mixed incontinence urge and stress  -     estradioL (ESTRACE) 0.01 % (0.1 mg/gram) vaginal cream; Place 1 g vaginally once daily. Use every other day for next 1-2 months  Dispense: 42.5 g; Refill: 2    Dyspareunia in female  -     estradioL (ESTRACE) 0.01 % (0.1 mg/gram) vaginal cream; Place 1 g vaginally once daily. Use every other day for next 1-2 months  Dispense: 42.5 g; Refill: 2        Plan:   Risks and benefits of hormone replacement therapy were discussed.  Hormone replacement therapy options, including bioidentical versus non-bioidentical hormones, as well as alternatives discussed.  Labs above ordered  Start:   Estradiol 1 mg orally QAM.   Progesterone 100 mg orally QPM   Lubrigyn  Consider:   Testosterone cypionate.  FDA warning for MI, stroke, and DVT reviewed.  Patient is aware this is off-label use.     Discussed:   Pregnenolone  mg orally QAM.   DHEA after T optimized   Melatonin after optimized on P4   Vitamin D and Vitamin B12 recommendations after lab results    Follow up in 4 months  Will recheck labs once on typical optimal dose or if having side effects.  Instructed patient to call if she experiences any side effects or has any questions.

## 2022-07-20 ENCOUNTER — OFFICE VISIT (OUTPATIENT)
Dept: UROGYNECOLOGY | Facility: CLINIC | Age: 57
End: 2022-07-20
Payer: COMMERCIAL

## 2022-07-20 VITALS
HEIGHT: 68 IN | BODY MASS INDEX: 25.76 KG/M2 | DIASTOLIC BLOOD PRESSURE: 73 MMHG | HEART RATE: 64 BPM | SYSTOLIC BLOOD PRESSURE: 141 MMHG | WEIGHT: 170 LBS

## 2022-07-20 DIAGNOSIS — N39.46 MIXED INCONTINENCE URGE AND STRESS: ICD-10-CM

## 2022-07-20 DIAGNOSIS — N94.10 DYSPAREUNIA IN FEMALE: ICD-10-CM

## 2022-07-20 DIAGNOSIS — R35.1 NOCTURIA MORE THAN TWICE PER NIGHT: ICD-10-CM

## 2022-07-20 DIAGNOSIS — M79.18 MYALGIA OF PELVIC FLOOR: Primary | ICD-10-CM

## 2022-07-20 PROCEDURE — 51701 PR INSERTION OF NON-INDWELLING BLADDER CATHETERIZATION FOR RESIDUAL UR: ICD-10-PCS | Mod: S$GLB,,, | Performed by: OBSTETRICS & GYNECOLOGY

## 2022-07-20 PROCEDURE — 99999 PR PBB SHADOW E&M-EST. PATIENT-LVL V: ICD-10-PCS | Mod: PBBFAC,,, | Performed by: OBSTETRICS & GYNECOLOGY

## 2022-07-20 PROCEDURE — 51701 INSERT BLADDER CATHETER: CPT | Mod: S$GLB,,, | Performed by: OBSTETRICS & GYNECOLOGY

## 2022-07-20 PROCEDURE — 99215 OFFICE O/P EST HI 40 MIN: CPT | Mod: 25,S$GLB,, | Performed by: OBSTETRICS & GYNECOLOGY

## 2022-07-20 PROCEDURE — 99999 PR PBB SHADOW E&M-EST. PATIENT-LVL V: CPT | Mod: PBBFAC,,, | Performed by: OBSTETRICS & GYNECOLOGY

## 2022-07-20 PROCEDURE — 99215 PR OFFICE/OUTPT VISIT, EST, LEVL V, 40-54 MIN: ICD-10-PCS | Mod: 25,S$GLB,, | Performed by: OBSTETRICS & GYNECOLOGY

## 2022-07-20 PROCEDURE — 87086 URINE CULTURE/COLONY COUNT: CPT | Performed by: OBSTETRICS & GYNECOLOGY

## 2022-07-20 NOTE — PATIENT INSTRUCTIONS
Bladder Irritants  Certain foods and drinks have been associated with worsening symptoms of urinary frequency, urgency, urge incontinence, or bladder pain. If you suffer from any of these conditions, you may wish to try eliminating one or more of these foods from your diet and see if your symptoms improve. If bladder symptoms are related to dietary factors, strict adherence to a diet thateliminates the food should bring marked relief in 10 days. Once you are feeling better, you can begin to add foods back into your diet, one at a time. If symptoms return, you will be able to identify the irritant. As you add foods back to your diet it is very important that you drink significant amounts of water.    -----------------------------------------------------------------------------------------------  List of Common Bladder Irritants*  Alcoholic beverages  Apples and apple juice  Cantaloupe  Carbonated beverages  Chili and spicy foods  Chocolate  Citrus fruit  Coffee (including decaffeinated)  Cranberries and cranberry juice  Grapes  Guava  Milk Products: milk, cheese, cottage cheese, yogurt, ice cream  Peaches  Pineapple  Plums  Strawberries  Sugar especially artificial sweeteners, saccharin, aspartame, corn sweeteners, honey, fructose, sucrose, lactose  Tea  Tomatoes and tomato juice  Vitamin B complex  Vinegar  *Most people are not sensitive to ALL of these products; your goal is to find the foods that make YOUR symptoms worse.  ---------------------------------------------------------------------------------------------------    Low-acid fruit substitutions include apricots, papaya, pears and watermelon. Coffee drinkers can drink Kava or other lowacid instant drinks. Tea drinkers can substitute non-citrus herbal and sun brewed teas. Calcium carbonate co-buffered with calcium ascorbate can be substituted for Vitamin C. Prelief is a dietary supplement that works as an acid blocker for the bladder.    Where to get more  information:        Overcoming Bladder Disorders by Danica Galvan and Rachel Bowles, 1990        You Dont Have to Live with Cystitis! By Kimberly Velásquez, 1988  http://www.urologymanagement.org/oab  -----------------------------------------------------------------------  1)  Mixed urinary incontinence, urge (mostly bothered by overactive bladder) > stress:    --urine C&S  --Empty bladder every 3 hours.  Empty well: wait a minute, lean forward on toilet.    --Avoid dietary irritants (see sheet).  Keep diary x 3-5 days to determine your irritants.  --start pelvic floor PT.  Call to make appt.    --URGE: Stop VESIcare 10 mg.  Start mirabegron 50 mg daily.  Takes 2-4 weeks to see if will have effect.  For dry mouth: get sour, sugar free lozenge or gum.     --continue vaginal estrogen--treating vaginal dryness can help urinary urgency/frequency  --continue working on anxiety/stress--see if can talk to therapist about not internalizing stress  --STRESS:  Pessary vs. Sling.     2)  Pain with intercourse:  --treat vaginal atrophy (dryness):  Continue vaginal estrogen cream as directed.  Make sure to apply internally and use around opening/inner lips.    --treating may help pain with intercourse  --levator tenderness/tension on exam:   --restart pelvic floor PT. Call to make appt:  [facility] (all take Medicaid):  [facility] (Emilia Anderson or other): [address] [place] [address], [address]   [id number]. Patients can park in the [place] entrance and it is on the first floor. (p) [phone] (Leslie Bernard, ). (f) [phone].      3)  Nocturia (nighttime urination):   --stop fluids 2 hours before bed.    --no water by bed  --If have leg swelling:  Elevate feet above chest x 1 hour before bed to get excess fluid off.  Can also use support hose (knee highs).      4)  RTC 3-4 months.

## 2022-07-20 NOTE — PROGRESS NOTES
"[facility] - UROGYNECOLOGY  [address] [place] [id number]    Sahrri Hill  [id number]  1965    Consulting Physician: Karoline Hunt MD   GYN: MD Gilbert  Primary M.D.: David Bose MD    Chief Complaint   Patient presents with    Urinary Incontinence    dyspareunis     New patient     CC: urinary urgency/frequency     1)  UI:  (+) SABRINA > (+) UUI  X 2years.  (--) pads usually minimum wetness and no nighttime wetness.  Daytime frequency: Q 1-2 hours.  Nocturia: Yes: 1-2/night- states she drinks water frequently throughout the night.   (--) dysuria,  (--) hematuria,  (--) frequent UTIs.  (+) complete bladder emptying.  --taking VESIcare 10 mg daily: unsure if helping    2)  POP: Absent Symptoms:(--).  (--) vaginal bleeding. (--) vaginal discharge. (+) sexually active.  (+) dyspareunia- pain with dryness, worse with insertion and persistent even with deep penetration. Has tried PFPT in the past for pain, but states did not help. Reports she feels "tense" and sometimes she has to "grin and bear it to get through the act" of intercourse. Reports good libido/desire for intercourse and feels her pain has improved since starting vaginal estrogen (+)  Vaginal dryness.  (+) vaginal estrogen use.     3)  BM:  (--) constipation/straining.  (--) chronic diarrhea. (--) hematochezia.  (--) fecal incontinence.  (--) fecal smearing/urgency.  (+) complete evacuation.      Past Medical History  Past Medical History:   Diagnosis Date    Abnormal Pap smear of cervix     Cryo Done ([place])     Anxiety     Esophageal reflux     History of shingles ,     Right arm   and  Left hip     Menopause 2018    Migraine     Urge incontinence         Past Surgical History  Past Surgical History:   Procedure Laterality Date    APPENDECTOMY       SECTION      COLONOSCOPY  [id number]    Polyp in the transverse. Repeat in 5 years    KNEE SURGERY  1996   route " of appy: went through [facility], complications with abscess postop     Hysterectomy: No    Past Ob History     C/s x 1, breech but did labor  Largest infant weight: 6lb2oz    Gynecologic History   LMP: Patient's last menstrual period was 10/17/2018.  Age of menarche: 13  Age of menopause: 52  Menstrual history: regular, monthly, +migraines/back cramps, longterm OCP   Pap test:  normal/HPV neg.  History of abnormal paps: Yes - 30 years ago, cryosurgery.  History of STIs:  No  Mammogram: Date of last: 3/2022.  Result: Normal  Colonoscopy: Date of last: .  Result:  polyp.  Repeat due:  --scheduled next week.    DEXA:  Ordered per Dr. Hudson     Family History  Family History   Problem Relation Age of Onset    Diabetes Maternal Grandmother     Diabetes Maternal Grandfather     Breast cancer Maternal Aunt          at age 40    Hypertension Father     Hyperlipidemia Father     Coronary artery disease Father     Depression Mother     Colon cancer Neg Hx     Ovarian cancer Neg Hx       Colon CA: No  Breast CA: Yes - maternal aunt  age 40  GYN CA: No   CA: No    Social History  Social History     Tobacco Use   Smoking Status Never Smoker   Smokeless Tobacco Never Used   .  Never smoker   Social History     Substance and Sexual Activity   Alcohol Use Yes    Comment: socially   .    Social History     Substance and Sexual Activity   Drug Use No   .  The patient is .  Resides in [place] [address].  Employment status: retired.      Allergies  Review of patient's allergies indicates:  No Known Allergies    Medications  Current Outpatient Medications on File Prior to Visit   Medication Sig Dispense Refill    butalbital-acetaminophen-caff -40 mg Cap       EScitalopram oxalate (LEXAPRO) 10 MG tablet Take 1 tablet (10 mg total) by mouth once daily. 30 tablet 5    estradioL (ESTRACE) 0.01 % (0.1 mg/gram) vaginal cream Place 1 g vaginally once daily. Use every other day for next  "1-2 months 42.5 g 2    estradioL (ESTRACE) 1 MG tablet Take 1 tablet (1 mg total) by mouth every morning. 90 tablet 3    gabapentin (NEURONTIN) 300 MG capsule Take 1 capsule by mouth 2 (two) times daily.      HYDROcodone-acetaminophen (NORCO) 7.5-325 mg per tablet Take 1 tablet by mouth 2 (two) times daily as needed.      nabumetone (RELAFEN) 750 MG tablet TAKE ONE TABLET BY MOUTH TWICE A DAY 60 tablet 0    progesterone (PROMETRIUM) 100 MG capsule Take 1 capsule by mouth 30-60 minutes before bed every night 90 capsule 3    PROTONIX 40 mg tablet Take 40 mg by mouth every morning.      solifenacin (VESICARE) 10 MG tablet Take 1 tablet (10 mg total) by mouth once daily. 90 tablet 3    tiZANidine (ZANAFLEX) 4 MG tablet Take 4 mg by mouth 2 (two) times daily.      XIIDRA 5 % Dpet       zolpidem (AMBIEN) 10 mg Tab TAKE ONE TABLET BY MOUTH AT BEDTIME AS NEEDED 30 tablet 0    fexofenadine HCl (ALLEGRA ORAL) Take by mouth.      fluticasone propionate (FLONASE) 50 mcg/actuation nasal spray       NURTEC 75 mg odt Take 75 mg by mouth daily as needed.      SUTAB 1.479-0.188- 0.225 gram tablet SMARTSI Tablet(s) By Mouth As Directed       No current facility-administered medications on file prior to visit.       Review of Systems A 14 point ROS was reviewed with pertinent positives as noted above in the history of present illness.      Constitutional: negative  Eyes: negative  Endocrine: negative  Gastrointestinal: negative  Cardiovascular: negative  Respiratory: negative  Allergic/Immunologic: negative  Integumentary: negative  Psychiatric: negative  Musculoskeletal: negative   Ear/Nose/Throat: negative  Neurologic: negative  Genitourinary: SEE HPI  Hematologic/Lymphatic: negative   Breast: negative    Urogynecologic Exam  BP (!) 141/73 (BP Location: Left arm, Patient Position: Sitting, BP Method: Large (Automatic))   Pulse 64   Ht 5' 8" (1.727 m)   Wt 77.1 kg (169 lb 15.6 oz)   LMP 10/17/2018 Comment:   " 2018  BMI 25.84 kg/m²     GENERAL APPEARANCE:  The patient is well-developed, well-nourished.  Neck:  Supple with no thyromegaly, no carotid bruits.  Heart:  Regular rate and rhythm, no murmurs, rubs or gallops.  Lungs:  Clear.  No CVA tenderness.  Abdomen:  Soft, nontender, nondistended, no hepatosplenomegaly.  Incisions:  RLQ well-healed    PELVIC:    External genitalia:  Normal Bartholins, Skenes and labia bilaterally.    Urethra:  No caruncle, diverticulum or masses.  (+) hypermobility.    Vagina:  Atrophy (+) , no bladder masses or tender, no discharge.  +TTP LV B--feels like dyspareunia. Tight introitus muscles.   Cervix:  absent  Uterus: uterus absent  Adnexa: Not palpable.    POP-Q:    Deferred.  No obvious POP present with valsalva.     NEUROLOGIC:  Cranial nerves 2 through 12 intact.  Strength 5/5.  DTRs 2+ lower extremities.  S2 through 4 normal.  Sacral reflexes intact.    EXT: ACHARYA, 2+ pulses bilaterally, no C/C/E    COUGH STRESS TEST:  negative  KEGEL: 1 /5    RECTAL:    External:  Normal, (--) hemorrhoids, (--) dovetailing.   Internal: deferred    PVR: 30 mL    Impression    1. Myalgia of pelvic floor    2. Mixed incontinence urge and stress    3. Dyspareunia in female    4. Nocturia more than twice per night        Initial Plan  The patient was counseled regarding these issues. The patient was given a summary sheet containing each of these issues with possible options for evaluation and management. When appropriate, we also reviewed computer-generated diagrams specific to their diagnoses..  All questions were addressed to the patient's satisfaction.    1)  Mixed urinary incontinence, urge (mostly bothered by overactive bladder) > stress:    --urine C&S  --Empty bladder every 3 hours.  Empty well: wait a minute, lean forward on toilet.    --Avoid dietary irritants (see sheet).  Keep diary x 3-5 days to determine your irritants.  --start pelvic floor PT.  Call to make appt.    --URGE: Stop VESIcare 10  mg.  Start mirabegron 50 mg daily.  Takes 2-4 weeks to see if will have effect.  For dry mouth: get sour, sugar free lozenge or gum.     --continue vaginal estrogen--treating vaginal dryness can help urinary urgency/frequency  --continue working on anxiety/stress--see if can talk to therapist about not internalizing stress  --STRESS:  Pessary vs. Sling.     2)  Pain with intercourse:  --treat vaginal atrophy (dryness):  Continue vaginal estrogen cream as directed.  Make sure to apply internally and use around opening/inner lips.    --treating may help pain with intercourse  --levator tenderness/tension on exam:   --restart pelvic floor PT. Call to make appt:  [facility] (all take Medicaid):  [facility] (Emilia Anderson or other): [address] [place] [address], [place] [address]. Patients can park in the [place] entrance and it is on the first floor. (p) [phone] (Leslie Bernard, ). (f) [phone].      3)  Nocturia (nighttime urination):   --stop fluids 2 hours before bed.    --no water by bed  --If have leg swelling:  Elevate feet above chest x 1 hour before bed to get excess fluid off.  Can also use support hose (knee highs).      4)  RTC 3-4 months.     Approximately 45 min were spent in consult, 90 % in discussion.     Thank you for requesting consultation of your patient.  I look forward to participating in their care.    Elena Ware  Female Pelvic Medicine and Reconstructive Surgery  [facility] [place], [place]

## 2022-07-21 LAB — BACTERIA UR CULT: NO GROWTH

## 2022-07-22 PROBLEM — R35.1 NOCTURIA MORE THAN TWICE PER NIGHT: Status: ACTIVE | Noted: 2022-07-22

## 2022-07-22 PROBLEM — N39.46 MIXED INCONTINENCE URGE AND STRESS: Status: ACTIVE | Noted: 2022-07-22

## 2022-07-22 PROBLEM — M79.18 MYALGIA OF PELVIC FLOOR: Status: ACTIVE | Noted: 2022-07-22

## 2022-07-22 LAB — TESTOST FREE SERPL-MCNC: 0.7 PG/ML

## 2022-07-28 ENCOUNTER — PATIENT OUTREACH (OUTPATIENT)
Dept: ADMINISTRATIVE | Facility: HOSPITAL | Age: 57
End: 2022-07-28
Payer: COMMERCIAL

## 2022-07-28 LAB — CRC RECOMMENDATION EXT: NORMAL

## 2022-07-28 NOTE — PROGRESS NOTES
Health Maintenance Due   Topic Date Due    TETANUS VACCINE  Never done    Shingles Vaccine (1 of 2) Never done    COVID-19 Vaccine (3 - Booster for Moderna series) 10/04/2021     Chart review done. HM updated. Immunizations reviewed & updated. Care Everywhere updated.  Colonoscopy/EGD report scanned into chart.

## 2022-08-04 ENCOUNTER — HOSPITAL ENCOUNTER (OUTPATIENT)
Dept: RADIOLOGY | Facility: HOSPITAL | Age: 57
Discharge: HOME | End: 2022-08-04
Attending: OBSTETRICS & GYNECOLOGY
Payer: COMMERCIAL

## 2022-08-04 DIAGNOSIS — N95.1 SYMPTOMATIC MENOPAUSAL OR FEMALE CLIMACTERIC STATES: ICD-10-CM

## 2022-08-04 PROCEDURE — 77080 DEXA BONE DENSITY SPINE HIP: ICD-10-PCS | Mod: 26,,, | Performed by: RADIOLOGY

## 2022-08-04 PROCEDURE — 77080 DXA BONE DENSITY AXIAL: CPT | Mod: 26,,, | Performed by: RADIOLOGY

## 2022-08-04 PROCEDURE — 77080 DXA BONE DENSITY AXIAL: CPT | Mod: TC

## 2022-08-05 DIAGNOSIS — G47.09 OTHER INSOMNIA: ICD-10-CM

## 2022-08-05 RX ORDER — ZOLPIDEM TARTRATE 10 MG/1
TABLET ORAL
Qty: 30 TABLET | Refills: 0 | Status: SHIPPED | OUTPATIENT
Start: 2022-08-05 | End: 2022-09-12

## 2022-08-05 NOTE — TELEPHONE ENCOUNTER
No new care gaps identified.  [facility] Embedded Care Gaps. Reference number: [id number]. 8/05/2022   2:06:51 PM CDT

## 2022-08-15 DIAGNOSIS — M17.12 PRIMARY OSTEOARTHRITIS OF LEFT KNEE: ICD-10-CM

## 2022-08-15 RX ORDER — NABUMETONE 750 MG/1
TABLET, FILM COATED ORAL
Qty: 60 TABLET | Refills: 0 | Status: SHIPPED | OUTPATIENT
Start: 2022-08-15 | End: 2022-09-12

## 2022-08-24 PROBLEM — R52 PAIN: Status: ACTIVE | Noted: 2022-08-24

## 2022-08-24 PROBLEM — R53.1 WEAKNESS: Status: ACTIVE | Noted: 2022-08-24

## 2022-09-10 DIAGNOSIS — G47.09 OTHER INSOMNIA: ICD-10-CM

## 2022-09-10 DIAGNOSIS — M17.12 PRIMARY OSTEOARTHRITIS OF LEFT KNEE: ICD-10-CM

## 2022-09-10 NOTE — TELEPHONE ENCOUNTER
No new care gaps identified.  [facility] Embedded Care Gaps. Reference number: [id number]. 9/10/2022   12:28:18 PM CDT

## 2022-09-12 RX ORDER — NABUMETONE 750 MG/1
TABLET, FILM COATED ORAL
Qty: 60 TABLET | Refills: 0 | Status: SHIPPED | OUTPATIENT
Start: 2022-09-12 | End: 2022-10-12

## 2022-09-12 RX ORDER — ZOLPIDEM TARTRATE 10 MG/1
TABLET ORAL
Qty: 30 TABLET | Refills: 0 | Status: SHIPPED | OUTPATIENT
Start: 2022-09-12 | End: 2022-10-12

## 2022-09-14 ENCOUNTER — TELEPHONE (OUTPATIENT)
Dept: DERMATOLOGY | Facility: CLINIC | Age: 57
End: 2022-09-14
Payer: COMMERCIAL

## 2022-09-14 NOTE — TELEPHONE ENCOUNTER
----- Message from Francisco Pang LPN sent at 9/13/2022  4:39 PM CDT -----  Contact: [phone]    ----- Message -----  From: Taryn Shepard  Sent: 9/13/2022   8:48 AM CDT  To: Carol VIEIRA Staff    Pt is calling to get scheduled for an appt, for annual skin check. Pt is a new patient & is looking to switch dermatologists.      Pt Access tried but none avail.    Pt would like a call back. [phone]

## 2022-09-26 ENCOUNTER — PATIENT MESSAGE (OUTPATIENT)
Dept: OBSTETRICS AND GYNECOLOGY | Facility: CLINIC | Age: 57
End: 2022-09-26
Payer: COMMERCIAL

## 2022-09-26 NOTE — TELEPHONE ENCOUNTER
Pt c/o bleeding x1 week.  Starting on estradiol and progesterone 2 months ago.  Reassured her irregular bleeding is normal in the first 3-6 months of starting on HRT.  See messages.

## 2022-10-05 ENCOUNTER — PATIENT MESSAGE (OUTPATIENT)
Dept: INTERNAL MEDICINE | Facility: CLINIC | Age: 57
End: 2022-10-05
Payer: COMMERCIAL

## 2022-10-18 ENCOUNTER — OFFICE VISIT (OUTPATIENT)
Dept: INTERNAL MEDICINE | Facility: CLINIC | Age: 57
End: 2022-10-18
Payer: COMMERCIAL

## 2022-10-18 ENCOUNTER — PATIENT MESSAGE (OUTPATIENT)
Dept: INTERNAL MEDICINE | Facility: CLINIC | Age: 57
End: 2022-10-18

## 2022-10-18 DIAGNOSIS — G47.00 INSOMNIA, UNSPECIFIED TYPE: Primary | ICD-10-CM

## 2022-10-18 PROCEDURE — 99214 PR OFFICE/OUTPT VISIT, EST, LEVL IV, 30-39 MIN: ICD-10-PCS | Mod: 95,,, | Performed by: INTERNAL MEDICINE

## 2022-10-18 PROCEDURE — 99214 OFFICE O/P EST MOD 30 MIN: CPT | Mod: 95,,, | Performed by: INTERNAL MEDICINE

## 2022-10-18 NOTE — PROGRESS NOTES
The patient location is: home   The chief complaint leading to consultation is: medication management     Visit type: audiovisual    Face to Face time with patient: 10 min  15 minutes of total time spent on the encounter, which includes face to face time and non-face to face time preparing to see the patient (eg, review of tests), Obtaining and/or reviewing separately obtained history, Documenting clinical information in the electronic or other health record, Independently interpreting results (not separately reported) and communicating results to the patient/family/caregiver, or Care coordination (not separately reported).         Each patient to whom he or she provides medical services by telemedicine is:  (1) informed of the relationship between the physician and patient and the respective role of any other health care provider with respect to management of the patient; and (2) notified that he or she may decline to receive medical services by telemedicine and may withdraw from such care at any time.    Notes:  [facility] Note    Chief Complaint      Chief Complaint   Patient presents with    Medication Management       History of Present Illness      Sharri Hill is a 57 y.o. female who presents today for   Chief Complaint   Patient presents with    Medication Management   .  Patient comes to appointment here for 3 m appointment for medication management . Is on Ambien for insomnia a .  reviewed today. She is stable meds are working well . All other chronic issues stable as well     Problem List Items Addressed This Visit          Other    Insomnia - Primary    Overview      reviewed cont ambien prn               Past Medical History:  Past Medical History:   Diagnosis Date    Abnormal Pap smear of cervix 1992    Cryo Done ([place])     Anxiety     Esophageal reflux     History of shingles 2012, 2021    Right arm 2012  and  Left hip 2021    Menopause 2018    Migraine     Urge  incontinence        Past Surgical History:  Past Surgical History:   Procedure Laterality Date    APPENDECTOMY       SECTION      COLONOSCOPY  [id number]    Polyp in the transverse. Repeat in 5 years    KNEE SURGERY         Family History:  family history includes Breast cancer in her maternal aunt; Coronary artery disease in her father; Depression in her mother; Diabetes in her maternal grandfather and maternal grandmother; Hyperlipidemia in her father; Hypertension in her father.    Social History:  Social History     Socioeconomic History    Marital status:    Tobacco Use    Smoking status: Never    Smokeless tobacco: Never   Substance and Sexual Activity    Alcohol use: Yes     Comment: socially    Drug use: No    Sexual activity: Yes     Partners: Male     Birth control/protection: Partner-Vasectomy, Post-menopausal     Comment:         Review of Systems:   Review of Systems   HENT:  Negative for hearing loss.    Eyes:  Negative for discharge.   Respiratory:  Negative for wheezing.    Cardiovascular:  Negative for chest pain and palpitations.   Gastrointestinal:  Positive for diarrhea. Negative for blood in stool, constipation and vomiting.   Genitourinary:  Negative for dysuria and hematuria.   Musculoskeletal:  Positive for neck pain.   Neurological:  Positive for headaches. Negative for weakness.   Endo/Heme/Allergies:  Positive for polydipsia.       Medications:  Outpatient Encounter Medications as of 10/18/2022   Medication Sig Note Dispense Refill    butalbital-acetaminophen-caff -40 mg Cap        EScitalopram oxalate (LEXAPRO) 10 MG tablet Take 1 tablet (10 mg total) by mouth once daily.  30 tablet 5    estradioL (ESTRACE) 0.01 % (0.1 mg/gram) vaginal cream Place 1 g vaginally once daily. Use every other day for next 1-2 months  42.5 g 2    estradioL (ESTRACE) 1 MG tablet Take 1 tablet (1 mg total) by mouth every morning.  90 tablet 3    fexofenadine HCl (ALLEGRA  ORAL) Take by mouth.       fluticasone propionate (FLONASE) 50 mcg/actuation nasal spray  2020: Take as needed      gabapentin (NEURONTIN) 300 MG capsule Take 1 capsule by mouth 2 (two) times daily.       HYDROcodone-acetaminophen (NORCO) 7.5-325 mg per tablet Take 1 tablet by mouth 2 (two) times daily as needed.       mirabegron (MYRBETRIQ) 50 mg Tb24 Take 1 tablet (50 mg total) by mouth once daily.  30 tablet 11    nabumetone (RELAFEN) 750 MG tablet TAKE ONE TABLET BY MOUTH TWICE A DAY  60 tablet 0    progesterone (PROMETRIUM) 100 MG capsule Take 1 capsule by mouth 30-60 minutes before bed every night  90 capsule 3    PROTONIX 40 mg tablet Take 40 mg by mouth every morning.       tiZANidine (ZANAFLEX) 4 MG tablet Take 4 mg by mouth 2 (two) times daily.       XIIDRA 5 % Dpet        zolpidem (AMBIEN) 10 mg Tab TAKE ONE TABLET BY MOUTH AT BEDTIME AS NEEDED  30 tablet 0    [DISCONTINUED] NURTEC 75 mg odt Take 75 mg by mouth daily as needed.       [DISCONTINUED] solifenacin (VESICARE) 10 MG tablet Take 1 tablet (10 mg total) by mouth once daily.  90 tablet 3    [DISCONTINUED] SUTAB 1.479-0.188- 0.225 gram tablet SMARTSI Tablet(s) By Mouth As Directed        No facility-administered encounter medications on file as of 10/18/2022.        Allergies:  Review of patient's allergies indicates:  No Known Allergies      Physical Exam       There were no vitals filed for this visit.      Physical Exam  Constitutional:       General: She is not in acute distress.     Appearance: Normal appearance. She is not ill-appearing.   Eyes:      General:         Right eye: No discharge.         Left eye: No discharge.      Extraocular Movements: Extraocular movements intact.      Pupils: Pupils are equal, round, and reactive to light.   Pulmonary:      Effort: Pulmonary effort is normal.   Neurological:      General: No focal deficit present.      Mental Status: She is alert and oriented to person, place, and time.   Psychiatric:          Mood and Affect: Mood normal.         Thought Content: Thought content normal.         Judgment: Judgment normal.        Laboratory:  CBC:  No results for input(s): WBC, RBC, HGB, HCT, PLT, MCV, MCH, MCHC in the last 2160 hours.  CMP:  No results for input(s): GLU, CALCIUM, ALBUMIN, PROT, NA, K, CO2, CL, BUN, ALKPHOS, ALT, AST, BILITOT in the last 2160 hours.    Invalid input(s): CREATININ  URINALYSIS:  No results for input(s): COLORU, CLARITYU, SPECGRAV, PHUR, PROTEINUA, GLUCOSEU, BILIRUBINCON, BLOODU, WBCU, RBCU, BACTERIA, MUCUS, NITRITE, LEUKOCYTESUR, UROBILINOGEN, HYALINECASTS in the last 2160 hours.   LIPIDS:  No results for input(s): TSH, HDL, CHOL, TRIG, LDLCALC, CHOLHDL, NONHDLCHOL, TOTALCHOLEST in the last 2160 hours.  TSH:  No results for input(s): TSH in the last 2160 hours.  A1C:  No results for input(s): HGBA1C in the last 2160 hours.    Radiology:        Assessment:     Sharri Hill is a 57 y.o.female with:    Insomnia, unspecified type        Plan:     Problem List Items Addressed This Visit          Other    Insomnia - Primary    Overview      reviewed cont ambien prn             As above, continue current medications and maintain follow up with specialists.  Return to clinic in 5 months.      Frederick W Dantagnan [facility]

## 2022-10-26 ENCOUNTER — TELEPHONE (OUTPATIENT)
Dept: INTERNAL MEDICINE | Facility: CLINIC | Age: 57
End: 2022-10-26
Payer: COMMERCIAL

## 2022-10-26 NOTE — TELEPHONE ENCOUNTER
----- Message from Lexi Haro sent at 10/26/2022 11:17 AM CDT -----  Type:  Needs Medical Advice    Who Called: pt  Symptoms (please be specific): stye on left eye  How long has patient had these symptoms:  2 days  Pharmacy name and phone #:  [facility] [place]  [place], [place] - [address]  Would the patient rather a call back or a response via [facility]? Call back  Best Call Back Number: [phone]  Additional Information:     Pt would like a prescription   Pt stated she's living town and she needs this

## 2022-11-08 DIAGNOSIS — N95.1 SYMPTOMATIC MENOPAUSAL OR FEMALE CLIMACTERIC STATES: ICD-10-CM

## 2022-11-08 NOTE — TELEPHONE ENCOUNTER
----- Message from Kacey Kennedy LPN sent at 11/8/2022  4:04 PM CST -----  Regarding: RE: Hormone Consult  Contact: SHANKAR LANG [[id number]]  Patient is requesting refills on her hormone medication.  ----- Message -----  From: Magaly Wyatt  Sent: 11/8/2022  10:23 AM CST  To: Faizan [facility] Staff  Subject: Hormone Consult                                  Name of Who is Calling: Shankar Lang            What is the request in detail: she states she is a former of pt of Dr. Hudson, she is requesting a call back in regards to est care for a hormone consult . Please advise she needs refill on her hormone RX urgently           Can the clinic reply by [facility]: No           What Number to Call Back if not in [facility]:[phone]

## 2022-11-09 RX ORDER — ESTRADIOL 1 MG/1
1 TABLET ORAL EVERY MORNING
Qty: 90 TABLET | Refills: 0 | Status: SHIPPED | OUTPATIENT
Start: 2022-11-09 | End: 2023-02-28

## 2022-11-09 RX ORDER — PROGESTERONE 100 MG/1
CAPSULE ORAL
Qty: 90 CAPSULE | Refills: 0 | Status: SHIPPED | OUTPATIENT
Start: 2022-11-09 | End: 2023-06-01 | Stop reason: SDUPTHER

## 2022-11-15 DIAGNOSIS — N39.46 MIXED INCONTINENCE URGE AND STRESS: ICD-10-CM

## 2022-11-15 DIAGNOSIS — N94.10 DYSPAREUNIA IN FEMALE: ICD-10-CM

## 2022-11-15 DIAGNOSIS — N95.1 MENOPAUSAL STATE: ICD-10-CM

## 2022-11-15 RX ORDER — ESTRADIOL 0.1 MG/G
1 CREAM VAGINAL DAILY
Qty: 42.5 G | Refills: 2 | Status: SHIPPED | OUTPATIENT
Start: 2022-11-15 | End: 2023-03-02

## 2022-11-15 NOTE — TELEPHONE ENCOUNTER
Swing pt called in for refill request for   estradioL (ESTRACE) 0.01 % (0.1 mg/gram) vaginal cream 42.5 g sent to [facility] @ [phone]

## 2022-11-18 ENCOUNTER — OFFICE VISIT (OUTPATIENT)
Dept: UROGYNECOLOGY | Facility: CLINIC | Age: 57
End: 2022-11-18
Payer: COMMERCIAL

## 2022-11-18 ENCOUNTER — TELEPHONE (OUTPATIENT)
Dept: OBSTETRICS AND GYNECOLOGY | Facility: CLINIC | Age: 57
End: 2022-11-18
Payer: COMMERCIAL

## 2022-11-18 VITALS — HEIGHT: 68 IN | DIASTOLIC BLOOD PRESSURE: 80 MMHG | SYSTOLIC BLOOD PRESSURE: 140 MMHG | BODY MASS INDEX: 25.84 KG/M2

## 2022-11-18 DIAGNOSIS — N94.10 DYSPAREUNIA IN FEMALE: ICD-10-CM

## 2022-11-18 DIAGNOSIS — M79.18 MYALGIA OF PELVIC FLOOR: ICD-10-CM

## 2022-11-18 DIAGNOSIS — R35.1 NOCTURIA MORE THAN TWICE PER NIGHT: ICD-10-CM

## 2022-11-18 DIAGNOSIS — N39.46 MIXED INCONTINENCE URGE AND STRESS: Primary | ICD-10-CM

## 2022-11-18 DIAGNOSIS — N95.0 POSTMENOPAUSAL BLEEDING: ICD-10-CM

## 2022-11-18 PROCEDURE — 99213 OFFICE O/P EST LOW 20 MIN: CPT | Mod: S$GLB,,, | Performed by: NURSE PRACTITIONER

## 2022-11-18 PROCEDURE — 99213 PR OFFICE/OUTPT VISIT, EST, LEVL III, 20-29 MIN: ICD-10-PCS | Mod: S$GLB,,, | Performed by: NURSE PRACTITIONER

## 2022-11-18 PROCEDURE — 99999 PR PBB SHADOW E&M-EST. PATIENT-LVL V: CPT | Mod: PBBFAC,,, | Performed by: NURSE PRACTITIONER

## 2022-11-18 PROCEDURE — 99999 PR PBB SHADOW E&M-EST. PATIENT-LVL V: ICD-10-PCS | Mod: PBBFAC,,, | Performed by: NURSE PRACTITIONER

## 2022-11-18 RX ORDER — PROMETHAZINE HYDROCHLORIDE 25 MG/1
25-50 TABLET ORAL EVERY 8 HOURS PRN
COMMUNITY
Start: 2022-09-29 | End: 2023-02-28

## 2022-11-18 RX ORDER — SUCRALFATE 1 G/1
1 TABLET ORAL 3 TIMES DAILY
COMMUNITY
Start: 2022-11-09 | End: 2023-08-02

## 2022-11-18 NOTE — PROGRESS NOTES
"Urogyn follow up  11/18/2022  .  [facility] - UROGYNECOLOGY  [address] [address]    Sharri Hill  [id number]  1965      Sharri Hill is a 57 y.o. here for a urogyn follow up or mixed incontinence.    Last HPI from 07/20/2022  1)  UI:  (+) SABRINA > (+) UUI  X 2years.  (--) pads usually minimum wetness and no nighttime wetness.  Daytime frequency: Q 1-2 hours.  Nocturia: Yes: 1-2/night- states she drinks water frequently throughout the night.   (--) dysuria,  (--) hematuria,  (--) frequent UTIs.  (+) complete bladder emptying.  --taking VESIcare 10 mg daily: unsure if helping     2)  POP: Absent Symptoms:(--).  (--) vaginal bleeding. (--) vaginal discharge. (+) sexually active.  (+) dyspareunia- pain with dryness, worse with insertion and persistent even with deep penetration. Has tried PFPT in the past for pain, but states did not help. Reports she feels "tense" and sometimes she has to "grin and bear it to get through the act" of intercourse. Reports good libido/desire for intercourse and feels her pain has improved since starting vaginal estrogen (+)  Vaginal dryness.  (+) vaginal estrogen use.      3)  BM:  (--) constipation/straining.  (--) chronic diarrhea. (--) hematochezia.  (--) fecal incontinence.  (--) fecal smearing/urgency.  (+) complete evacuation.         Changes from last visit:  1)  Mixed urinary incontinence, urge (mostly bothered by overactive bladder) > stress:    --taking  mirabegron 50 mg daily.   --denies UI  --voiding every 30 minutes- one hour  --nocturia improved 1/ night       2)  Pain with intercourse:  --using vaginal estrogen cream twice weekly  --did go to pelvic floor PT-- did not go to last sessions    3)postmenopausal bleeding  --on HRT x 3 months  --began spotting / bleeding one month into treatment         Past Medical History:   Diagnosis Date    Abnormal Pap smear of cervix 1992    Cryo Done ([facility])     Anxiety     Esophageal reflux     History of " shingles 2021    Right arm   and  Left hip     Menopause 2018    Migraine     Urge incontinence        Past Surgical History:   Procedure Laterality Date    APPENDECTOMY       SECTION      COLONOSCOPY  [id number]    Polyp in the transverse. Repeat in 5 years    KNEE SURGERY         Family History   Problem Relation Age of Onset    Diabetes Maternal Grandmother     Diabetes Maternal Grandfather     Breast cancer Maternal Aunt          at age 40    Hypertension Father     Hyperlipidemia Father     Coronary artery disease Father     Depression Mother     Colon cancer Neg Hx     Ovarian cancer Neg Hx        Social History     Socioeconomic History    Marital status:    Tobacco Use    Smoking status: Never    Smokeless tobacco: Never   Substance and Sexual Activity    Alcohol use: Yes     Comment: socially    Drug use: No    Sexual activity: Yes     Partners: Male     Birth control/protection: Partner-Vasectomy, Post-menopausal     Comment:         Current Outpatient Medications   Medication Sig Dispense Refill    butalbital-acetaminophen-caff -40 mg Cap       EScitalopram oxalate (LEXAPRO) 10 MG tablet Take 1 tablet (10 mg total) by mouth once daily. 30 tablet 5    estradioL (ESTRACE) 0.01 % (0.1 mg/gram) vaginal cream Place 1 g vaginally once daily. Use every other day for next 1-2 months 42.5 g 2    estradioL (ESTRACE) 1 MG tablet Take 1 tablet (1 mg total) by mouth every morning. 90 tablet 0    fexofenadine HCl (ALLEGRA ORAL) Take by mouth.      fluticasone propionate (FLONASE) 50 mcg/actuation nasal spray       gabapentin (NEURONTIN) 300 MG capsule Take 1 capsule by mouth 2 (two) times daily.      HYDROcodone-acetaminophen (NORCO) 7.5-325 mg per tablet Take 1 tablet by mouth 2 (two) times daily as needed.      mirabegron (MYRBETRIQ) 50 mg Tb24 Take 1 tablet (50 mg total) by mouth once daily. 30 tablet 11    nabumetone (RELAFEN) 750 MG tablet TAKE ONE TABLET BY  "MOUTH TWICE A DAY 60 tablet 0    progesterone (PROMETRIUM) 100 MG capsule Take 1 capsule by mouth 30-60 minutes before bed every night 90 capsule 0    promethazine (PHENERGAN) 25 MG tablet Take 25-50 mg by mouth every 8 (eight) hours as needed.      PROTONIX 40 mg tablet Take 40 mg by mouth every morning.      sucralfate (CARAFATE) 1 gram tablet Take 1 g by mouth 3 (three) times daily.      tiZANidine (ZANAFLEX) 4 MG tablet Take 4 mg by mouth 2 (two) times daily.      XIIDRA 5 % Dpet       zolpidem (AMBIEN) 10 mg Tab TAKE ONE TABLET BY MOUTH AT BEDTIME AS NEEDED 30 tablet 0     No current facility-administered medications for this visit.       Review of patient's allergies indicates:  No Known Allergies    Well woman:  Pap test: 2022 normal/HPV neg.  History of abnormal paps: Yes - 30 years ago, cryosurgery.  History of STIs:  No  Mammogram: Date of last: 3/2022.  Result: Normal  Colonoscopy: Date of last: 2016.  Result:  polyp.  Repeat due:  2021--scheduled next week.    DEXA:  Ordered per Dr. Hudson     ROS:  As per HPI.      Exam  BP (!) 140/80 (BP Location: Right arm, Patient Position: Sitting, BP Method: Medium (Manual))   Ht 5' 8" (1.727 m)   LMP 10/17/2018 Comment:   2018  BMI 25.84 kg/m²   General: alert and oriented, no acute distress  Respiratory: normal respiratory effort  Abd: soft, non-tender, non-distended    Pelvic  Ext. Genitalia: normal external genitalia. Normal bartholin's and skeens glands  Vagina: + atrophy. Normal vaginal mucosa without lesions. No discharge noted.   Non-tender bladder base without palpable mass.  ++TTP in bilateral OI--entire introitus is very tight.  No pain in bilateral levator ani  Cervix: no lesions  Uterus:  uterus is normal size, shape, consistency and nontender   Urethra: no masses or tenderness  Urethral meatus: no lesions, caruncle or prolapse.    Impression  1. Mixed incontinence urge and stress        2. Postmenopausal bleeding  US Pelvis Comp with Transvag " NON-OB (xpd      3. Dyspareunia in female        4. Myalgia of pelvic floor  Ambulatory referral/consult to Physical/Occupational Therapy      5. Nocturia more than twice per night          We reviewed the above issues and discussed options for short-term versus long-term management of her problems.   Plan:     1)  Mixed urinary incontinence, urge (mostly bothered by overactive bladder) > stress:    --Empty bladder every 3 hours.  Empty well: wait a minute, lean forward on toilet.    --Avoid dietary irritants (see sheet).  Keep diary x 3-5 days to determine your irritants.  --start pelvic floor PT.  Call to make appt.    --URGE: continue mirabegron 50 mg daily.  Takes 2-4 weeks to see if will have effect.  For dry mouth: get sour, sugar free lozenge or gum.                --continue vaginal estrogen--treating vaginal dryness can help urinary urgency/frequency  --continue working on anxiety/stress--see if can talk to therapist about not internalizing stress  --STRESS:  Pessary vs. Sling.      2)  Pain with intercourse:  --treat vaginal atrophy (dryness):  Continue vaginal estrogen cream as directed.  Make sure to apply internally and use around opening/inner lips.               --treating may help pain with intercourse  --levator tenderness/tension on exam:              --restart pelvic floor PT. Call to make appt:  [facility] (all take Medicaid):  [facility] (Emilia Anderson or other): [address] [place] [address]. Patients can park in the [place] entrance and it is on the first floor. (p) [phone] (Leslie Bernard, ). [phone]) [phone].    --use vaginal dilators around tense muscles 3-4 times / week  --continue pelvic floor relaxation exercises  --vaginal valium 5 mg/ lidocaine 2%/ baclofen 4% suppositories twice daily as needed  --I have sent in the prescription to [facility] in [place], [place].  Their phone number is [phone].  Ask for the compounding department.       3)   Nocturia (nighttime urination):   --stop fluids 2 hours before bed.    --no water by bed  --If have leg swelling:  Elevate feet above chest x 1 hour before bed to get excess fluid off.  Can also use support hose (knee highs).       4)  RTC 3-4 months.       I spent a total of 20 minutes on the day of the visit.  This includes face to face time and non-face to face time preparing to see the patient (eg, review of tests), obtaining and/or reviewing separately obtained history, documenting clinical information in the electronic or other health record, independently interpreting results and communicating results to the patient/family/caregiver, or care coordinator.   [facility]  Division of Female Pelvic Medicine and Reconstructive Surgery  Department of Obstetrics & Gynecology

## 2022-11-18 NOTE — PATIENT INSTRUCTIONS
1)  Mixed urinary incontinence, urge (mostly bothered by overactive bladder) > stress:    --Empty bladder every 3 hours.  Empty well: wait a minute, lean forward on toilet.    --Avoid dietary irritants (see sheet).  Keep diary x 3-5 days to determine your irritants.  --start pelvic floor PT.  Call to make appt.    --URGE: continue mirabegron 50 mg daily.  Takes 2-4 weeks to see if will have effect.  For dry mouth: get sour, sugar free lozenge or gum.                --continue vaginal estrogen--treating vaginal dryness can help urinary urgency/frequency  --continue working on anxiety/stress--see if can talk to therapist about not internalizing stress  --STRESS:  Pessary vs. Sling.      2)  Pain with intercourse:  --treat vaginal atrophy (dryness):  Continue vaginal estrogen cream as directed.  Make sure to apply internally and use around opening/inner lips.               --treating may help pain with intercourse  --levator tenderness/tension on exam:              --restart pelvic floor PT. Call to make appt:  [facility] (all take Medicaid):  [facility] (Emilia Anderson or other): [address] [place], [place] [id number]. Patients can park in the [place] entrance and it is on the first floor. (p) [phone] (Leslie Bernard, ). (f) [phone].    --use vaginal dilators around tense muscles 3-4 times / week  --continue pelvic floor relaxation exercises  --vaginal valium 5 mg/ lidocaine 2%/ baclofen 4% suppositories twice daily as needed  --I have sent in the prescription to [facility] in [place], [place].  Their phone number is [phone].  Ask for the compounding department.       3)  Nocturia (nighttime urination):   --stop fluids 2 hours before bed.    --no water by bed  --If have leg swelling:  Elevate feet above chest x 1 hour before bed to get excess fluid off.  Can also use support hose (knee highs).       4)  postmenopausal bleeding  --pelvic ultrasound ordered    5)RTC 3-4 months

## 2022-11-30 ENCOUNTER — PATIENT MESSAGE (OUTPATIENT)
Dept: INTERNAL MEDICINE | Facility: CLINIC | Age: 57
End: 2022-11-30
Payer: COMMERCIAL

## 2022-12-01 ENCOUNTER — PATIENT MESSAGE (OUTPATIENT)
Dept: INTERNAL MEDICINE | Facility: CLINIC | Age: 57
End: 2022-12-01
Payer: COMMERCIAL

## 2022-12-01 ENCOUNTER — PATIENT MESSAGE (OUTPATIENT)
Dept: UROGYNECOLOGY | Facility: CLINIC | Age: 57
End: 2022-12-01
Payer: COMMERCIAL

## 2022-12-01 DIAGNOSIS — N93.9 ABNORMAL UTERINE BLEEDING: Primary | ICD-10-CM

## 2022-12-02 ENCOUNTER — PATIENT MESSAGE (OUTPATIENT)
Dept: INTERNAL MEDICINE | Facility: CLINIC | Age: 57
End: 2022-12-02
Payer: COMMERCIAL

## 2022-12-05 ENCOUNTER — PATIENT MESSAGE (OUTPATIENT)
Dept: INTERNAL MEDICINE | Facility: CLINIC | Age: 57
End: 2022-12-05
Payer: COMMERCIAL

## 2022-12-08 ENCOUNTER — PATIENT MESSAGE (OUTPATIENT)
Dept: INTERNAL MEDICINE | Facility: CLINIC | Age: 57
End: 2022-12-08
Payer: COMMERCIAL

## 2022-12-12 ENCOUNTER — OFFICE VISIT (OUTPATIENT)
Dept: INTERNAL MEDICINE | Facility: CLINIC | Age: 57
End: 2022-12-12
Payer: COMMERCIAL

## 2022-12-12 VITALS
WEIGHT: 164.13 LBS | SYSTOLIC BLOOD PRESSURE: 130 MMHG | HEART RATE: 72 BPM | BODY MASS INDEX: 24.88 KG/M2 | TEMPERATURE: 99 F | DIASTOLIC BLOOD PRESSURE: 88 MMHG | RESPIRATION RATE: 18 BRPM | OXYGEN SATURATION: 96 % | HEIGHT: 68 IN

## 2022-12-12 DIAGNOSIS — I10 BENIGN ESSENTIAL HYPERTENSION: Primary | ICD-10-CM

## 2022-12-12 PROCEDURE — 99214 PR OFFICE/OUTPT VISIT, EST, LEVL IV, 30-39 MIN: ICD-10-PCS | Mod: S$GLB,,, | Performed by: INTERNAL MEDICINE

## 2022-12-12 PROCEDURE — 99999 PR PBB SHADOW E&M-EST. PATIENT-LVL V: CPT | Mod: PBBFAC,,, | Performed by: INTERNAL MEDICINE

## 2022-12-12 PROCEDURE — 99999 PR PBB SHADOW E&M-EST. PATIENT-LVL V: ICD-10-PCS | Mod: PBBFAC,,, | Performed by: INTERNAL MEDICINE

## 2022-12-12 PROCEDURE — 99214 OFFICE O/P EST MOD 30 MIN: CPT | Mod: S$GLB,,, | Performed by: INTERNAL MEDICINE

## 2022-12-12 RX ORDER — LOSARTAN POTASSIUM 50 MG/1
50 TABLET ORAL DAILY
Qty: 30 TABLET | Refills: 5 | Status: SHIPPED | OUTPATIENT
Start: 2022-12-12 | End: 2022-12-27

## 2022-12-12 NOTE — PROGRESS NOTES
[facility] Note    Chief Complaint      Chief Complaint   Patient presents with    Follow-up     F/u on b/p       History of Present Illness      Sharri Hill is a 57 y.o. female who presents today for   Chief Complaint   Patient presents with    Follow-up     F/u on b/p   .  Patient comes to appointment here for high blood pressure . She has been noted to have pressure in the 160/90 range . She had old bottle of clonidine that she has trupti taking as needed through the weekend     Problem List Items Addressed This Visit          Cardiac/Vascular    Benign essential hypertension - Primary    Overview     Losartan 50 mg   bp check in 1 week               Past Medical History:  Past Medical History:   Diagnosis Date    Abnormal Pap smear of cervix     Cryo Done ([facility])     Anxiety     Esophageal reflux     History of shingles ,     Right arm   and  Left hip     Menopause     Migraine     Urge incontinence        Past Surgical History:  Past Surgical History:   Procedure Laterality Date    APPENDECTOMY       SECTION      COLONOSCOPY  [id number]    Polyp in the transverse. Repeat in 5 years    COSMETIC SURGERY      Nose    KNEE SURGERY         Family History:  family history includes Breast cancer in her maternal aunt; Coronary artery disease in her father; Depression in her mother; Diabetes in her maternal grandfather and maternal grandmother; Heart disease in her father; Hyperlipidemia in her father; Hypertension in her father; Kidney disease in her sister.    Social History:  Social History     Socioeconomic History    Marital status:    Tobacco Use    Smoking status: Never    Smokeless tobacco: Never   Substance and Sexual Activity    Alcohol use: Yes     Alcohol/week: 6.0 standard drinks     Types: 6 Glasses of wine per week     Comment: Something more    Drug use: No    Sexual activity: Yes     Partners: Male     Birth  control/protection: Partner-Vasectomy, Post-menopausal     Comment:         Review of Systems:   Review of Systems   Constitutional:  Negative for fever and weight loss.   HENT:  Positive for congestion and sinus pain. Negative for hearing loss and sore throat.    Eyes:  Negative for blurred vision.   Respiratory:  Positive for cough. Negative for shortness of breath.    Cardiovascular:  Negative for chest pain, palpitations, claudication and leg swelling.   Gastrointestinal:  Negative for abdominal pain, constipation, diarrhea and heartburn.   Genitourinary:  Negative for dysuria.   Musculoskeletal:  Negative for back pain and myalgias.   Skin:  Negative for rash.   Neurological:  Negative for focal weakness and headaches.   Psychiatric/Behavioral:  Negative for depression and suicidal ideas. The patient is not nervous/anxious.        Medications:  Outpatient Encounter Medications as of 12/12/2022   Medication Sig Note Dispense Refill    butalbital-acetaminophen-caff -40 mg Cap        EScitalopram oxalate (LEXAPRO) 10 MG tablet Take 1 tablet (10 mg total) by mouth once daily.  30 tablet 5    estradioL (ESTRACE) 0.01 % (0.1 mg/gram) vaginal cream Place 1 g vaginally once daily. Use every other day for next 1-2 months  42.5 g 2    estradioL (ESTRACE) 1 MG tablet Take 1 tablet (1 mg total) by mouth every morning.  90 tablet 0    fexofenadine HCl (ALLEGRA ORAL) Take by mouth.       fluticasone propionate (FLONASE) 50 mcg/actuation nasal spray  7/20/2020: Take as needed      gabapentin (NEURONTIN) 300 MG capsule Take 1 capsule by mouth 2 (two) times daily.       HYDROcodone-acetaminophen (NORCO) 7.5-325 mg per tablet Take 1 tablet by mouth 2 (two) times daily as needed.       mirabegron (MYRBETRIQ) 50 mg Tb24 Take 1 tablet (50 mg total) by mouth once daily.  30 tablet 11    nabumetone (RELAFEN) 750 MG tablet TAKE ONE TABLET BY MOUTH TWICE A DAY  60 tablet 0    progesterone (PROMETRIUM) 100 MG capsule Take 1  capsule by mouth 30-60 minutes before bed every night  90 capsule 0    promethazine (PHENERGAN) 25 MG tablet Take 25-50 mg by mouth every 8 (eight) hours as needed.       PROTONIX 40 mg tablet Take 40 mg by mouth every morning.       sucralfate (CARAFATE) 1 gram tablet Take 1 g by mouth 3 (three) times daily.       tiZANidine (ZANAFLEX) 4 MG tablet Take 4 mg by mouth 2 (two) times daily.       XIIDRA 5 % Dpet        zolpidem (AMBIEN) 10 mg Tab TAKE ONE TABLET BY MOUTH AT BEDTIME AS NEEDED  30 tablet 0    losartan (COZAAR) 50 MG tablet Take 1 tablet (50 mg total) by mouth once daily.  30 tablet 5     No facility-administered encounter medications on file as of 12/12/2022.        Allergies:  Review of patient's allergies indicates:  No Known Allergies      Physical Exam         Vitals:    12/12/22 1117   BP: 130/88   Pulse: 72   Resp: 18   Temp: 98.6 °F (37 °C)         Physical Exam  Constitutional:       Appearance: She is well-developed.   Eyes:      Pupils: Pupils are equal, round, and reactive to light.   Neck:      Thyroid: No thyromegaly.   Cardiovascular:      Rate and Rhythm: Normal rate.      Heart sounds: Normal heart sounds. No murmur heard.    No friction rub. No gallop.   Pulmonary:      Breath sounds: Normal breath sounds.   Abdominal:      General: Bowel sounds are normal.      Palpations: Abdomen is soft.   Musculoskeletal:         General: Normal range of motion.      Cervical back: Normal range of motion.   Lymphadenopathy:      Cervical: No cervical adenopathy.   Skin:     General: Skin is warm.      Findings: No rash.   Neurological:      Mental Status: She is alert and oriented to person, place, and time.      Cranial Nerves: No cranial nerve deficit.   Psychiatric:         Behavior: Behavior normal.        Laboratory:  CBC:  No results for input(s): WBC, RBC, HGB, HCT, PLT, MCV, MCH, MCHC in the last 2160 hours.  CMP:  No results for input(s): GLU, CALCIUM, ALBUMIN, PROT, NA, K, CO2, CL, BUN,  ALKPHOS, ALT, AST, BILITOT in the last 2160 hours.    Invalid input(s): CREATININ  URINALYSIS:  No results for input(s): COLORU, CLARITYU, SPECGRAV, PHUR, PROTEINUA, GLUCOSEU, BILIRUBINCON, BLOODU, WBCU, RBCU, BACTERIA, MUCUS, NITRITE, LEUKOCYTESUR, UROBILINOGEN, HYALINECASTS in the last 2160 hours.   LIPIDS:  No results for input(s): TSH, HDL, CHOL, TRIG, LDLCALC, CHOLHDL, NONHDLCHOL, TOTALCHOLEST in the last 2160 hours.  TSH:  No results for input(s): TSH in the last 2160 hours.  A1C:  No results for input(s): HGBA1C in the last 2160 hours.    Radiology:        Assessment:     Sharri Hill is a 57 y.o.female with:    Benign essential hypertension  -     losartan (COZAAR) 50 MG tablet; Take 1 tablet (50 mg total) by mouth once daily.  Dispense: 30 tablet; Refill: 5          Plan:     Problem List Items Addressed This Visit          Cardiac/Vascular    Benign essential hypertension - Primary    Overview     Losartan 50 mg   bp check in 1 week             As above, continue current medications and maintain follow up with specialists.  Return to clinic as scheduled       Frederick W Dantagnan [facility]

## 2022-12-13 ENCOUNTER — PATIENT MESSAGE (OUTPATIENT)
Dept: UROGYNECOLOGY | Facility: CLINIC | Age: 57
End: 2022-12-13
Payer: COMMERCIAL

## 2022-12-20 ENCOUNTER — PROCEDURE VISIT (OUTPATIENT)
Dept: UROGYNECOLOGY | Facility: CLINIC | Age: 57
End: 2022-12-20
Payer: COMMERCIAL

## 2022-12-20 VITALS
WEIGHT: 160.94 LBS | SYSTOLIC BLOOD PRESSURE: 122 MMHG | DIASTOLIC BLOOD PRESSURE: 80 MMHG | HEIGHT: 68 IN | BODY MASS INDEX: 24.39 KG/M2

## 2022-12-20 DIAGNOSIS — N95.0 POST-MENOPAUSAL BLEEDING: Primary | ICD-10-CM

## 2022-12-20 DIAGNOSIS — N88.2 CERVICAL STENOSIS (UTERINE CERVIX): ICD-10-CM

## 2022-12-20 PROCEDURE — 58100 ENDOMETRIAL BIOPSY: ICD-10-PCS | Mod: 53,S$GLB,, | Performed by: NURSE PRACTITIONER

## 2022-12-20 PROCEDURE — 58100 BIOPSY OF UTERUS LINING: CPT | Mod: 53,S$GLB,, | Performed by: NURSE PRACTITIONER

## 2022-12-20 RX ORDER — MISOPROSTOL 200 UG/1
200 TABLET ORAL EVERY 6 HOURS
Qty: 4 TABLET | Refills: 0 | Status: SHIPPED | OUTPATIENT
Start: 2022-12-20 | End: 2023-02-28

## 2022-12-20 NOTE — PROCEDURES
Endometrial biopsy    Date/Time: 12/20/2022 3:00 PM  Performed by: Karen Gamble NP  Authorized by: Karen Gamble NP     Consent:     Consent obtained:  Written    Consent given by:  Patient    Patient questions answered: yes      Patient agrees, verbalizes understanding, and wants to proceed: yes      Educational handouts given: yes      Instructions and paperwork completed: yes    Indication:     Indications: Post-menopausal bleeding      Chronicity of post-menopausal bleeding:  New    Progression of post-menopausal bleeding:  Partially resolved  Pre-procedure:     Pre-procedure timeout performed: yes    Procedure:     Procedure: endometrial biopsy with Pipelle      Cervix cleaned and prepped: yes      A paracervical block was performed: no      An intracervical block was performed: no      The cervix was dilated: no      Uterus sounded: no      Unable to perform due to: cervical stenosis    Comments:     Procedure comments:  Will treat with cytotec and try to sample

## 2022-12-27 ENCOUNTER — PROCEDURE VISIT (OUTPATIENT)
Dept: UROGYNECOLOGY | Facility: CLINIC | Age: 57
End: 2022-12-27
Payer: COMMERCIAL

## 2022-12-27 VITALS — HEIGHT: 68 IN | BODY MASS INDEX: 25.09 KG/M2 | WEIGHT: 165.56 LBS

## 2022-12-27 DIAGNOSIS — N95.0 POST-MENOPAUSAL BLEEDING: Primary | ICD-10-CM

## 2022-12-27 PROCEDURE — 58100 PR BIOPSY OF UTERUS LINING: ICD-10-PCS | Mod: 53,S$GLB,, | Performed by: NURSE PRACTITIONER

## 2022-12-27 PROCEDURE — 58100 BIOPSY OF UTERUS LINING: CPT | Mod: 53,S$GLB,, | Performed by: NURSE PRACTITIONER

## 2022-12-27 RX ORDER — NIFEDIPINE 30 MG/1
30 TABLET, EXTENDED RELEASE ORAL
COMMUNITY
Start: 2022-12-23 | End: 2023-02-28

## 2022-12-27 NOTE — Clinical Note
Sharri Pollack has had bleeding (sometimes large quantities) since starting  hormone therapy in July. Her endometrial lining is small, but ultrasound showed mixed echogenicity material present.  I have been unsuccessful in obtaining embx x 2 despite cytotec. She is scheduled for a hormone consult with  in February.   Woul you mind following up with her?    Thanks, Karen

## 2022-12-31 NOTE — PROCEDURES
Endometrial Biopsy- Today    Date/Time: 12/27/2022 9:00 AM  Performed by: Karen Gamble NP  Authorized by: Karen Gamble NP     Consent:     Consent obtained:  Written    Consent given by:  Patient    Patient questions answered: yes      Patient agrees, verbalizes understanding, and wants to proceed: yes      Instructions and paperwork completed: yes    Indication:     Indications: Post-menopausal bleeding      Chronicity of post-menopausal bleeding:  Recurrent    Progression of post-menopausal bleeding:  Waxing and waning  Pre-procedure:     Pre-procedure timeout performed: yes    Procedure:     Procedure: endometrial biopsy with Pipelle      Cervix cleaned and prepped: yes      An intracervical block was performed: no      The cervix was dilated: no      Uterus sounded: no      Unable to perform due to: cervical stenosis    Comments:     Procedure comments:  Cervical stenosis despite cytotec 200 mg x 4 doses.  Will consult gyn.  MARIAN Devine-BC

## 2023-01-04 PROBLEM — R10.2 PAIN OF PELVIC GIRDLE: Status: ACTIVE | Noted: 2023-01-04

## 2023-01-04 PROBLEM — N81.89 WEAKNESS OF PELVIC FLOOR: Status: ACTIVE | Noted: 2023-01-04

## 2023-01-10 ENCOUNTER — OFFICE VISIT (OUTPATIENT)
Dept: OBSTETRICS AND GYNECOLOGY | Facility: CLINIC | Age: 58
End: 2023-01-10
Payer: COMMERCIAL

## 2023-01-10 VITALS
HEIGHT: 68 IN | DIASTOLIC BLOOD PRESSURE: 72 MMHG | BODY MASS INDEX: 24.39 KG/M2 | WEIGHT: 160.94 LBS | SYSTOLIC BLOOD PRESSURE: 122 MMHG

## 2023-01-10 DIAGNOSIS — N95.0 POSTMENOPAUSAL BLEEDING: Primary | ICD-10-CM

## 2023-01-10 DIAGNOSIS — N95.1 MENOPAUSAL STATE: ICD-10-CM

## 2023-01-10 PROCEDURE — 99214 OFFICE O/P EST MOD 30 MIN: CPT | Mod: S$GLB,,, | Performed by: OBSTETRICS & GYNECOLOGY

## 2023-01-10 PROCEDURE — 99999 PR PBB SHADOW E&M-EST. PATIENT-LVL IV: ICD-10-PCS | Mod: PBBFAC,,, | Performed by: OBSTETRICS & GYNECOLOGY

## 2023-01-10 PROCEDURE — 99999 PR PBB SHADOW E&M-EST. PATIENT-LVL IV: CPT | Mod: PBBFAC,,, | Performed by: OBSTETRICS & GYNECOLOGY

## 2023-01-10 PROCEDURE — 99214 PR OFFICE/OUTPT VISIT, EST, LEVL IV, 30-39 MIN: ICD-10-PCS | Mod: S$GLB,,, | Performed by: OBSTETRICS & GYNECOLOGY

## 2023-01-10 RX ORDER — NIFEDIPINE 60 MG/1
60 TABLET, EXTENDED RELEASE ORAL
COMMUNITY
Start: 2023-01-05 | End: 2023-04-11

## 2023-01-10 RX ORDER — ESCITALOPRAM OXALATE 10 MG/1
20 TABLET ORAL DAILY
Qty: 60 TABLET | Refills: 0 | Status: SHIPPED | OUTPATIENT
Start: 2023-01-10 | End: 2023-03-27

## 2023-01-10 NOTE — Clinical Note
Sorry for the delay. Did not realize it was in pend mode. She has not had Hysteroscopy D&C due to cardiology delays. She is supposed to call me when cleared by cardiology to have done as figured you might want that before doing further hormonal therapy. Have a great day!

## 2023-01-11 ENCOUNTER — TELEPHONE (OUTPATIENT)
Dept: OBSTETRICS AND GYNECOLOGY | Facility: CLINIC | Age: 58
End: 2023-01-11
Payer: COMMERCIAL

## 2023-01-11 DIAGNOSIS — N95.1 SYMPTOMATIC MENOPAUSAL OR FEMALE CLIMACTERIC STATES: Primary | ICD-10-CM

## 2023-01-11 DIAGNOSIS — G47.09 OTHER INSOMNIA: ICD-10-CM

## 2023-01-11 DIAGNOSIS — N95.0 POSTMENOPAUSAL BLEEDING: ICD-10-CM

## 2023-01-11 RX ORDER — ZOLPIDEM TARTRATE 10 MG/1
TABLET ORAL
Qty: 30 TABLET | Refills: 0 | Status: SHIPPED | OUTPATIENT
Start: 2023-01-11 | End: 2023-02-07

## 2023-01-11 NOTE — TELEPHONE ENCOUNTER
Requested Date:  1/24/23    Requested Time:  to follow scheduled case at noon    Case Length:60 minutes    Surgeon: Karoline Hunt      Assistant Surgeon: None   Visit Type: Outpatient    PROCEDURE:Hysteroscopy Dilation and Curettage with possible polypectomy  Diagnosis: Postmenopausal bleeding  Anesthesia type: General   Comments/Special Equipment Needed:  Rep needed: no  Does the patient need a PreOp appointment with MD: no  U/S needed at pre-op: no  PCP clearance: Not Needed  When does she need her Post op appointment: 2 weeks virtual or in-person  Do you need additional time blocked out from clinic? no  If so, what time do you want to be back in clinic? N/A  When can the patient go back to work? 24-48 hours after procedure

## 2023-01-11 NOTE — TELEPHONE ENCOUNTER
No new care gaps identified.  [facility] Embedded Care Gaps. Reference number: [id number]. 1/11/2023   9:43:31 AM CST

## 2023-01-13 ENCOUNTER — PATIENT MESSAGE (OUTPATIENT)
Dept: UROGYNECOLOGY | Facility: CLINIC | Age: 58
End: 2023-01-13
Payer: COMMERCIAL

## 2023-01-18 ENCOUNTER — PATIENT MESSAGE (OUTPATIENT)
Dept: UROGYNECOLOGY | Facility: CLINIC | Age: 58
End: 2023-01-18
Payer: COMMERCIAL

## 2023-01-18 DIAGNOSIS — N39.46 MIXED INCONTINENCE URGE AND STRESS: Primary | ICD-10-CM

## 2023-01-18 RX ORDER — TOLTERODINE 4 MG/1
4 CAPSULE, EXTENDED RELEASE ORAL DAILY
Qty: 30 CAPSULE | Refills: 11 | Status: SHIPPED | OUTPATIENT
Start: 2023-01-18 | End: 2023-08-31

## 2023-01-19 ENCOUNTER — TELEPHONE (OUTPATIENT)
Dept: OBSTETRICS AND GYNECOLOGY | Facility: CLINIC | Age: 58
End: 2023-01-19
Payer: COMMERCIAL

## 2023-01-19 DIAGNOSIS — Z12.31 BREAST CANCER SCREENING BY MAMMOGRAM: Primary | ICD-10-CM

## 2023-01-19 NOTE — TELEPHONE ENCOUNTER
Pt just left her cardiologist appt and states that she is now on a different BP medication because her BP is still not stable and experiencing palpitations.  Will meed to do a holter monitor and echocardiogram to monitor heart.  Cardiologist recommends postponing her sx for another 2-3 months.    Advised to call back in a couple of months once she is cleared to reschedule her sx.    Pt also requesting mmg orders be faxed to [facility].    Mmg ordered and faxed to [facility].

## 2023-01-23 NOTE — TELEPHONE ENCOUNTER
Called patient and has cardiology testing planned this month. Surgery cancelled and will notify us as soon as given ok per cardiology to proceed with procedure.

## 2023-02-07 DIAGNOSIS — G47.09 OTHER INSOMNIA: ICD-10-CM

## 2023-02-07 RX ORDER — ZOLPIDEM TARTRATE 10 MG/1
TABLET ORAL
Qty: 30 TABLET | Refills: 0 | Status: SHIPPED | OUTPATIENT
Start: 2023-02-07 | End: 2023-03-15

## 2023-02-07 NOTE — TELEPHONE ENCOUNTER
No new care gaps identified.  [facility] Embedded Care Gaps. Reference number: [id number]. 2/07/2023   1:30:04 PM CST

## 2023-02-28 ENCOUNTER — OFFICE VISIT (OUTPATIENT)
Dept: OBSTETRICS AND GYNECOLOGY | Facility: CLINIC | Age: 58
End: 2023-02-28
Attending: OBSTETRICS & GYNECOLOGY
Payer: COMMERCIAL

## 2023-02-28 VITALS
BODY MASS INDEX: 24.71 KG/M2 | SYSTOLIC BLOOD PRESSURE: 113 MMHG | DIASTOLIC BLOOD PRESSURE: 75 MMHG | HEART RATE: 67 BPM | WEIGHT: 163 LBS | HEIGHT: 68 IN

## 2023-02-28 DIAGNOSIS — N95.1 MENOPAUSAL STATE: ICD-10-CM

## 2023-02-28 DIAGNOSIS — N95.1 MENOPAUSAL SYNDROME: Primary | ICD-10-CM

## 2023-02-28 DIAGNOSIS — N39.46 MIXED INCONTINENCE URGE AND STRESS: ICD-10-CM

## 2023-02-28 DIAGNOSIS — N94.10 DYSPAREUNIA IN FEMALE: ICD-10-CM

## 2023-02-28 PROCEDURE — 99215 OFFICE O/P EST HI 40 MIN: CPT | Mod: S$GLB,,, | Performed by: OBSTETRICS & GYNECOLOGY

## 2023-02-28 PROCEDURE — 99215 PR OFFICE/OUTPT VISIT, EST, LEVL V, 40-54 MIN: ICD-10-PCS | Mod: S$GLB,,, | Performed by: OBSTETRICS & GYNECOLOGY

## 2023-02-28 PROCEDURE — 99999 PR PBB SHADOW E&M-EST. PATIENT-LVL IV: CPT | Mod: PBBFAC,,, | Performed by: OBSTETRICS & GYNECOLOGY

## 2023-02-28 PROCEDURE — 99999 PR PBB SHADOW E&M-EST. PATIENT-LVL IV: ICD-10-PCS | Mod: PBBFAC,,, | Performed by: OBSTETRICS & GYNECOLOGY

## 2023-02-28 RX ORDER — LOSARTAN POTASSIUM 25 MG/1
25 TABLET ORAL
COMMUNITY
Start: 2023-02-18 | End: 2023-04-26

## 2023-02-28 NOTE — PROGRESS NOTES
Sharri Hill is a 57 y.o. female patient of Dr. Hudson who presents to discuss ongoing hormone replacement therapy. Menarche occurred at age 13 and the patient went into menopause at 52 years of age, which was 5 years ago. She had been on estradiol vaginal cream for 6 months prior to start of additional hormones with no problems per Urogyn. Patient began HRT with Dr. Hudson in July 2022 for management of hot flashes, moodiness, vaginal dryness, anxiety, decreased libido (mainly due to dyspareunia), insomnia, hair loss, palpitations, and joint pain. She was placed on Estrace 1mg and prometrium 100mg.   First episode of bleeding was within first three months of use with mostly spotting and some heavier bleeding like a light cycle day . She states she stopped the Estradiol within a few weeks after beginning the bleeding. An attempt at EMB was made by Urogyn twice with pretreatment with CYtotec, but unable to be completed due to cervical stenosis. Ultrasound showed a thin Endometrial stripe, with a possible small endocervical polyp.  She has seen Dr. Yañez, who has stated that she might need a Hysteroscopy D&C for further evaluation but not urgent, and her bleeding has now stopped over a amonth ago with no recurrence since stopping the Estradiol.   Primary concern is Pain with sex, which has improved with use of the Lidocain,2% Diazepam 5, Baclo 4%suppositories given by Urogyn to treat this.     An additional concern is her recent elevated Blood Pressure which seems to have begun a few months ago, states it had never been noted prior to beginning her HRT, and she is seeing Cardiology for this as well as for the palpitations she has been having, although the palpitations began prior to starting HRT.    The patient is sexually active.  She denies the following contraindications to HRT:  Vaginal bleeding, history of VTE/PE, thrombophilia,  breast cancer, or active liver disease.       PCP: Dr. David Bose        Routine labs:   Pap smear: 2022  Mammogram: 2022:   DEXA: 2022: normal   Colonoscopy: polyps    No visits with results within 3 Month(s) from this visit.   Latest known visit with results is:   Patient Outreach on 2022   Component Date Value Ref Range Status    CRC Recommendation External 2022 Repeat colonoscopy in 5 years   Final-Edited       Past Medical History:   Diagnosis Date    Abnormal Pap smear of cervix     Cryo Done ([place])     Anxiety     Esophageal reflux     History of shingles ,     Right arm   and  Left hip     Hypertension     Menopause 2018    Migraine     Urge incontinence      Past Surgical History:   Procedure Laterality Date    APPENDECTOMY       SECTION      COLONOSCOPY  [id number]    Polyp in the transverse. Repeat in 5 years    COSMETIC SURGERY      Nose    KNEE SURGERY       Social History     Tobacco Use    Smoking status: Never    Smokeless tobacco: Never   Substance Use Topics    Alcohol use: Yes     Alcohol/week: 6.0 standard drinks     Types: 6 Glasses of wine per week     Comment: Something more    Drug use: No     Family History   Problem Relation Age of Onset    Diabetes Maternal Grandmother     Diabetes Maternal Grandfather     Hypertension Father     Hyperlipidemia Father     Coronary artery disease Father 60    Heart disease Father     Depression Mother     Kidney disease Sister     Breast cancer Maternal Aunt          at age 40    Colon cancer Neg Hx     Ovarian cancer Neg Hx     Cancer Neg Hx     Stroke Neg Hx      OB History    Para Term  AB Living   1 1 1     1   SAB IAB Ectopic Multiple Live Births           1      # Outcome Date GA Lbr Dylan/2nd Weight Sex Delivery Anes PTL Lv   1 Term 95 40w0d  2.495 kg (5 lb 8 oz) F CS-Unspec   EDYTA       Current Outpatient Medications:     butalbital-acetaminophen-caff -40 mg Cap, , Disp: , Rfl:     EScitalopram oxalate (LEXAPRO) 10 MG tablet,  Take 2 tablets (20 mg total) by mouth once daily., Disp: 60 tablet, Rfl: 0    estradioL (ESTRACE) 0.01 % (0.1 mg/gram) vaginal cream, Place 1 g vaginally once daily. Use every other day for next 1-2 months, Disp: 42.5 g, Rfl: 2    fexofenadine HCl (ALLEGRA ORAL), Take by mouth., Disp: , Rfl:     fluticasone propionate (FLONASE) 50 mcg/actuation nasal spray, , Disp: , Rfl:     gabapentin (NEURONTIN) 300 MG capsule, Take 1 capsule by mouth 2 (two) times daily., Disp: , Rfl:     HYDROcodone-acetaminophen (NORCO) 7.5-325 mg per tablet, Take 1 tablet by mouth 2 (two) times daily as needed., Disp: , Rfl:     losartan (COZAAR) 25 MG tablet, Take 25 mg by mouth., Disp: , Rfl:     mirabegron (MYRBETRIQ) 50 mg Tb24, Take 1 tablet (50 mg total) by mouth once daily., Disp: 30 tablet, Rfl: 11    nabumetone (RELAFEN) 750 MG tablet, TAKE ONE TABLET BY MOUTH TWICE A DAY, Disp: 60 tablet, Rfl: 0    NIFEdipine (ADALAT CC) 60 MG TbSR, Take 60 mg by mouth., Disp: , Rfl:     progesterone (PROMETRIUM) 100 MG capsule, Take 1 capsule by mouth 30-60 minutes before bed every night, Disp: 90 capsule, Rfl: 0    PROTONIX 40 mg tablet, Take 40 mg by mouth every morning., Disp: , Rfl:     sucralfate (CARAFATE) 1 gram tablet, Take 1 g by mouth 3 (three) times daily., Disp: , Rfl:     tiZANidine (ZANAFLEX) 4 MG tablet, Take 4 mg by mouth 2 (two) times daily., Disp: , Rfl:     tolterodine (DETROL LA) 4 MG 24 hr capsule, Take 1 capsule (4 mg total) by mouth once daily., Disp: 30 capsule, Rfl: 11    XIIDRA 5 % Dpet, , Disp: , Rfl:     zolpidem (AMBIEN) 10 mg Tab, TAKE ONE TABLET BY MOUTH AT BEDTIME AS NEEDED, Disp: 30 tablet, Rfl: 0    Review of Systems:  General: No fever, chills, or weight loss.  Chest: No chest pain, shortness of breath, +palpitations.  Breast: No pain, masses, or nipple discharge.  Vulva: No pain, lesions, or itching.  Vagina: No relaxation, itching, discharge, or lesions.  Abdomen: No pain, nausea, vomiting, diarrhea, or  "constipation.  Urinary: No incontinence, nocturia, frequency, or dysuria.  Extremities:  No leg cramps, edema, or calf pain.  Neurologic: No headaches, dizziness, or visual changes.    Vitals:    02/28/23 0942   BP: 113/75   Pulse: 67   Weight: 73.9 kg (163 lb)   Height: 5' 8" (1.727 m)   PainSc: 0-No pain     Body mass index is 24.78 kg/m².    Assessment:    Menopausal syndrome        Plan:   Risks and benefits of hormone replacement therapy were discussed.  Hormone replacement therapy options, including bioidentical versus non-bioidentical hormones, as well as alternatives discussed.  Continue Progesterone 200mg nightly  Continue Vaginal estradiol     Follow up in 3 months  Will recheck to determine if should begin ERT again, and if so, would recommend use of Transdermal Estradiol , either the patch or Divigel      I spent 45 minutes with this patient today, >50% counseling.    "

## 2023-02-28 NOTE — PROGRESS NOTES
Subjective:       Patient ID: Sharri Hill is a 57 y.o. female.    Chief Complaint:  Vaginal Bleeding and Well Woman (Last pap and hpv 2022 normal/negative/)      History of Present Illness   Sharri Hill is a 57 y.o. female who presents for complaint of vaginal bleeding starting systemic hormone therapy for hot flashes 2022 with Dr. Hudson. Menarche occurred at age 13 and the patient went into menopause at 52 years of age, which was 5 years ago. She had been on estradiol vaginal cream for 6 months prior to start of additional hormones with no problems per Urogyn. First episode of bleeding was within first three months of use with mostly spotting and some heavier bleeding like a light cycle day. The patient is sexually active. Pelvic ultrasound done by NP with Urogyn as was being evaluated/treated for mixed incontinence at that time. An EMB was attempted but could not be done due to cervical stenosis. Patient sent for further evaluation/recommendations.    Also requesting increase in Lexapro dosing due to mood lability.    OB History          1    Para   1    Term   1            AB        Living   1         SAB        IAB        Ectopic        Multiple        Live Births   1                 GYN History  Age of Menarche:13  Age at first pregnancy:30   Age at first live birth:30  Number of months breastfeedin   Age at Menopause:52   Comments: History of abnormal pap 30 years ago with cryotherapy, normal since that time  No other STDs     Past Medical History:   Diagnosis Date    Abnormal Pap smear of cervix     Cryo Done ([place])     Anxiety     Esophageal reflux     History of shingles ,     Right arm   and  Left hip     Hypertension     Menopause 2018    Migraine     Urge incontinence        Past Surgical History:   Procedure Laterality Date    APPENDECTOMY       SECTION      COLONOSCOPY  [id number]    Polyp in the transverse. Repeat in 5 years     "COSMETIC SURGERY      Nose    KNEE SURGERY          Family History   Problem Relation Age of Onset    Diabetes Maternal Grandmother     Diabetes Maternal Grandfather     Hypertension Father     Hyperlipidemia Father     Coronary artery disease Father 60    Heart disease Father     Depression Mother     Kidney disease Sister     Breast cancer Maternal Aunt          at age 40    Colon cancer Neg Hx     Ovarian cancer Neg Hx     Cancer Neg Hx     Stroke Neg Hx         Social History     Socioeconomic History    Marital status:    Tobacco Use    Smoking status: Never    Smokeless tobacco: Never   Substance and Sexual Activity    Alcohol use: Yes     Alcohol/week: 6.0 standard drinks     Types: 6 Glasses of wine per week     Comment: Something more    Drug use: No    Sexual activity: Yes     Partners: Male     Birth control/protection: Partner-Vasectomy, Post-menopausal     Comment:  , together since 40 years        Review of Systems  Review of Systems   Constitutional:  Negative for chills, fever and unexpected weight change.   Respiratory:  Negative for chest tightness and shortness of breath.    Cardiovascular:  Negative for chest pain.   Gastrointestinal:  Negative for abdominal distention, abdominal pain, constipation, diarrhea, nausea and vomiting.   Endocrine: Negative for cold intolerance and heat intolerance.   Genitourinary:  Positive for vaginal bleeding. Negative for dyspareunia, frequency, pelvic pain, urgency and vaginal discharge.   Skin:  Negative for rash.   Hematological:  Does not bruise/bleed easily.   Psychiatric/Behavioral:  Positive for dysphoric mood. The patient is not nervous/anxious.       Objective:     Vitals:    01/10/23 1307   BP: 122/72   BP Location: Right arm   Patient Position: Sitting   Weight: 73 kg (160 lb 15 oz)   Height: 5' 8" (1.727 m)       Physical Exam:   Constitutional: She is oriented to person, place, and time. She appears well-developed and " well-nourished. No distress.             Abdominal: Soft. There is no abdominal tenderness.     Genitourinary:    Inguinal canal and rectum normal.      Pelvic exam was performed with patient supine.   The external female genitalia was normal.   No external genitalia lesions identified,Genitalia hair distrobution normal .   There is no rash or lesion on the right labia. There is no rash or lesion on the left labia. Right adnexum displays no mass, no tenderness and no fullness. Left adnexum displays no mass, no tenderness and no fullness. No  no vaginal discharge or bleeding in the vagina. Cervix exhibits no motion tenderness, no lesion, no friability and no lesion. Uterus is not enlarged and not tender. Normal urethral meatus.Urethra findings: no urethral mass and no tendernessBladder findings: no bladder distention and no bladder tenderness   Genitourinary Comments: Stenosis of cervical os. Unable to dilate in office. Declined further office intervention due to pain.                 Neurological: She is alert and oriented to person, place, and time.     Psychiatric: She has a normal mood and affect.     Pap smear 2/1/22 negative/HPV negative    Lab Results   Component Value Date    WBC 3.73 (L) 03/30/2022    HGB 13.6 03/30/2022    HCT 40.9 03/30/2022     (H) 03/30/2022     03/30/2022       US Pelvis Comp with Transvag NON-OB (xpd  Narrative: EXAMINATION:  US PELVIS COMP WITH TRANSVAG NON-OB (XPD)    CLINICAL HISTORY:  Postmenopausal bleeding    TECHNIQUE:  Transabdominal sonography of the pelvis was performed, followed by transvaginal sonography to better evaluate the uterus and ovaries.    COMPARISON:  12/01/2016    FINDINGS:  Uterus:    Size: 7.3 x 3.7 x 4.0 cm    Masses: None    Endometrium: Normal in this post menopausal patient, measuring 2.3 mm.    There is a trace amount of mixed echogenicity material present within the endocervical canal which may represent blood products.  A small polyp would  be difficult to exclude.    Right ovary:    Size: 2.0 x 1.4 x 1.6 cm    Appearance: Normal    Vascular flow: Normal.    Left ovary:    Not visualized    Free Fluid:    None.  Impression: 1. Trace mixed echogenicity material noted within the endocervical canal which may represent blood products.  A small polyp would be difficult to exclude.  2. Otherwise normal sonographic appearance of the uterus and right ovary  3. Nonvisualization of the left ovary.    Electronically signed by: Mike Barlow MD  Date:    11/21/2022  Time:    15:20        Assessment/ Plan:     Orders Placed This Encounter    EScitalopram oxalate (LEXAPRO) 10 MG tablet       Sharri was seen today for vaginal bleeding and well woman.    Diagnoses and all orders for this visit:    Postmenopausal bleeding    Menopausal state    Other orders  -     EScitalopram oxalate (LEXAPRO) 10 MG tablet; Take 2 tablets (20 mg total) by mouth once daily.    Reviewed history and results of testing in past 6 months. EMS thin but could not rule out possible polyp. Discussed Hysteroscopy Dilation and Curettage due to stenosis and declined additional trial for EMB in office. Patient agrees to procedure. Will obtain medical clearance and plan preop soon. All questions answered.    RTC prn preop      Karoline Hunt MD    30-minute visit. More than 50% of visit spent on counseling and care coordination. 25 minutes spent counseling patient face-to-face on testing results, differential diagnosis, management options including risks/benefits of Hysteroscopy D&C.      As of April 1, 2021, the Cures Act has been passed nationally. This new law requires that all doctors progress notes, lab results, pathology reports and radiology reports be released IMMEDIATELY to the patient in the patient portal. That means that the results are released to you at the EXACT same time they are released to me. Therefore, with all of the patients that I have I am not able to reply to each patient  exactly when the results come in. So there will be a delay from when you see the results to when I see them and have time to come up with a response to send you. Also I only see these results when I am on the computer at work. So if the results come in over the weekend or after 5 pm of a work day, I will not see them until the next business day. As you can tell, this is a challenge as a physician to give every patient the quick response they hope for and deserve. So please be patient!   Thanks for your understanding and patience.

## 2023-03-02 RX ORDER — ESTRADIOL 0.1 MG/G
CREAM VAGINAL
Qty: 42.5 G | Refills: 2 | Status: SHIPPED | OUTPATIENT
Start: 2023-03-02 | End: 2023-05-30

## 2023-03-02 RX ORDER — ESTRADIOL 0.1 MG/G
CREAM VAGINAL
Qty: 42.5 G | Refills: 2 | Status: SHIPPED | OUTPATIENT
Start: 2023-03-02 | End: 2023-03-02 | Stop reason: SDUPTHER

## 2023-03-15 ENCOUNTER — OFFICE VISIT (OUTPATIENT)
Dept: UROGYNECOLOGY | Facility: CLINIC | Age: 58
End: 2023-03-15
Payer: COMMERCIAL

## 2023-03-15 VITALS
HEIGHT: 68 IN | DIASTOLIC BLOOD PRESSURE: 80 MMHG | BODY MASS INDEX: 25.23 KG/M2 | SYSTOLIC BLOOD PRESSURE: 132 MMHG | WEIGHT: 166.44 LBS

## 2023-03-15 DIAGNOSIS — N39.46 MIXED INCONTINENCE URGE AND STRESS: Primary | ICD-10-CM

## 2023-03-15 DIAGNOSIS — F41.9 ANXIETY: ICD-10-CM

## 2023-03-15 DIAGNOSIS — G47.09 OTHER INSOMNIA: ICD-10-CM

## 2023-03-15 DIAGNOSIS — M79.18 MYALGIA OF PELVIC FLOOR: ICD-10-CM

## 2023-03-15 PROCEDURE — 99213 OFFICE O/P EST LOW 20 MIN: CPT | Mod: S$GLB,,, | Performed by: NURSE PRACTITIONER

## 2023-03-15 PROCEDURE — 99213 PR OFFICE/OUTPT VISIT, EST, LEVL III, 20-29 MIN: ICD-10-PCS | Mod: S$GLB,,, | Performed by: NURSE PRACTITIONER

## 2023-03-15 PROCEDURE — 99999 PR PBB SHADOW E&M-EST. PATIENT-LVL V: CPT | Mod: PBBFAC,,, | Performed by: NURSE PRACTITIONER

## 2023-03-15 PROCEDURE — 99999 PR PBB SHADOW E&M-EST. PATIENT-LVL V: ICD-10-PCS | Mod: PBBFAC,,, | Performed by: NURSE PRACTITIONER

## 2023-03-15 RX ORDER — HYDROCHLOROTHIAZIDE 25 MG/1
TABLET ORAL
COMMUNITY
Start: 2023-03-02 | End: 2023-09-08 | Stop reason: SDUPTHER

## 2023-03-15 RX ORDER — ZOLPIDEM TARTRATE 10 MG/1
TABLET ORAL
Qty: 30 TABLET | Refills: 0 | Status: SHIPPED | OUTPATIENT
Start: 2023-03-15 | End: 2023-04-18

## 2023-03-15 NOTE — PATIENT INSTRUCTIONS
1)  Mixed urinary incontinence, urge (mostly bothered by overactive bladder) > stress:    --Empty bladder every 3 hours.  Empty well: wait a minute, lean forward on toilet.    --Avoid dietary irritants (see sheet).  Keep diary x 3-5 days to determine your irritants.  --URGE: continue detrol mg mg daily.  Takes 2-4 weeks to see if will have effect.  For dry mouth: get sour, sugar free lozenge or gum.                --continue vaginal estrogen--treating vaginal dryness can help urinary urgency/frequency  --continue working on anxiety/stress--see if can talk to therapist about not internalizing stress  --STRESS:  Pessary vs. Sling.      2)  Pain with intercourse:  --treat vaginal atrophy (dryness):  Continue vaginal estrogen cream as directed.  Make sure to apply internally and use around opening/inner lips.               --treating may help pain with intercourse  --levator tenderness/tension on exam:              --complete pelvic floor PT  --use vaginal dilators around tense muscles 3-4 times / week  --continue pelvic floor relaxation exercises  --vaginal valium 5 mg/ lidocaine 2%/ baclofen 4% suppositories twice daily as needed     3)  Nocturia (nighttime urination):   --stop fluids 2 hours before bed.    --no water by bed  --If have leg swelling:  Elevate feet above chest x 1 hour before bed to get excess fluid off.  Can also use support hose (knee highs).       4) anxiety  --I have placed orders for behavioral health tel:[phone]    5)RTC 1 year

## 2023-03-15 NOTE — PROGRESS NOTES
"  Urogyn follow up  03/15/2023  .  [facility]  [address]  [facility] [address]    Sharri Hill  [id number]  1965      Sharri Hill is a 57 y.o. here for a urogyn follow up or mixed incontinence.    Last HPI from 07/20/2022  1)  UI:  (+) SABRINA > (+) UUI  X 2years.  (--) pads usually minimum wetness and no nighttime wetness.  Daytime frequency: Q 1-2 hours.  Nocturia: Yes: 1-2/night- states she drinks water frequently throughout the night.   (--) dysuria,  (--) hematuria,  (--) frequent UTIs.  (+) complete bladder emptying.  --taking VESIcare 10 mg daily: unsure if helping     2)  POP: Absent Symptoms:(--).  (--) vaginal bleeding. (--) vaginal discharge. (+) sexually active.  (+) dyspareunia- pain with dryness, worse with insertion and persistent even with deep penetration. Has tried PFPT in the past for pain, but states did not help. Reports she feels "tense" and sometimes she has to "grin and bear it to get through the act" of intercourse. Reports good libido/desire for intercourse and feels her pain has improved since starting vaginal estrogen (+)  Vaginal dryness.  (+) vaginal estrogen use.      3)  BM:  (--) constipation/straining.  (--) chronic diarrhea. (--) hematochezia.  (--) fecal incontinence.  (--) fecal smearing/urgency.  (+) complete evacuation.         11/18/2023  1)  Mixed urinary incontinence, urge (mostly bothered by overactive bladder) > stress:    --taking  mirabegron 50 mg daily.   --denies UI  --voiding every 30 minutes- one hour  --nocturia improved 1/ night     2)  Pain with intercourse:  --using vaginal estrogen cream twice weekly  --did go to pelvic floor PT-- did not go to last sessions    3)postmenopausal bleeding  --on HRT x 3 months  --began spotting / bleeding one month into treatment    Changes since last visit:  1)  Mixed urinary incontinence, urge (mostly bothered by overactive bladder) > stress:    --taking  tolteradine 4 mg daily--myrbetriq was too " expensive  --denies UI  --voiding every hour  --denies nocturia     2)  Pain with intercourse:  --using vaginal estrogen cream twice weekly  --going to pelvic floor therapy  --improved with vaginal valium/ lidocaine/ baclofen    3)postmenopausal bleeding  --resovled  --only on prometrium  --followed by Dr. Hunt  --may have D&C once cardiology clearance    4)Anxiety  --currently on lexapro 20 mg daily  --not going to talk therapy         Past Medical History:   Diagnosis Date    Abnormal Pap smear of cervix     Cryo Done ([facility])     Anxiety     Esophageal reflux     History of shingles ,     Right arm   and  Left hip     Hypertension     Menopause 2018    Migraine     Urge incontinence        Past Surgical History:   Procedure Laterality Date    APPENDECTOMY       SECTION      COLONOSCOPY  [id number]    Polyp in the transverse. Repeat in 5 years    COSMETIC SURGERY      Nose    KNEE SURGERY         Family History   Problem Relation Age of Onset    Diabetes Maternal Grandmother     Diabetes Maternal Grandfather     Hypertension Father     Hyperlipidemia Father     Coronary artery disease Father 60    Heart disease Father     Depression Mother     Kidney disease Sister     Breast cancer Maternal Aunt          at age 40    Colon cancer Neg Hx     Ovarian cancer Neg Hx     Cancer Neg Hx     Stroke Neg Hx        Social History     Socioeconomic History    Marital status:    Tobacco Use    Smoking status: Never    Smokeless tobacco: Never   Substance and Sexual Activity    Alcohol use: Yes     Alcohol/week: 6.0 standard drinks     Types: 6 Glasses of wine per week     Comment: Something more    Drug use: No    Sexual activity: Yes     Partners: Male     Birth control/protection: Partner-Vasectomy, Post-menopausal     Comment:  , together since 40 years       Current Outpatient Medications   Medication Sig Dispense Refill    butalbital-acetaminophen-caff  -40 mg Cap       estradioL (ESTRACE) 0.01 % (0.1 mg/gram) vaginal cream PLACE 1GM VAGINALLY 2-3 TIMES WEEKLY 42.5 g 2    fexofenadine HCl (ALLEGRA ORAL) Take by mouth.      fluticasone propionate (FLONASE) 50 mcg/actuation nasal spray       gabapentin (NEURONTIN) 300 MG capsule Take 1 capsule by mouth 2 (two) times daily.      hydroCHLOROthiazide (HYDRODIURIL) 25 MG tablet       HYDROcodone-acetaminophen (NORCO) 7.5-325 mg per tablet Take 1 tablet by mouth 2 (two) times daily as needed.      losartan (COZAAR) 25 MG tablet Take 25 mg by mouth.      nabumetone (RELAFEN) 750 MG tablet TAKE ONE TABLET BY MOUTH TWICE A DAY 60 tablet 0    NIFEdipine (ADALAT CC) 60 MG TbSR Take 60 mg by mouth.      progesterone (PROMETRIUM) 100 MG capsule Take 1 capsule by mouth 30-60 minutes before bed every night 90 capsule 0    PROTONIX 40 mg tablet Take 40 mg by mouth every morning.      sucralfate (CARAFATE) 1 gram tablet Take 1 g by mouth 3 (three) times daily.      tiZANidine (ZANAFLEX) 4 MG tablet Take 4 mg by mouth 2 (two) times daily.      tolterodine (DETROL LA) 4 MG 24 hr capsule Take 1 capsule (4 mg total) by mouth once daily. 30 capsule 11    XIIDRA 5 % Dpet       zolpidem (AMBIEN) 10 mg Tab TAKE ONE TABLET BY MOUTH AT BEDTIME AS NEEDED 30 tablet 0    EScitalopram oxalate (LEXAPRO) 10 MG tablet Take 2 tablets (20 mg total) by mouth once daily. 60 tablet 0    mirabegron (MYRBETRIQ) 50 mg Tb24 Take 1 tablet (50 mg total) by mouth once daily. (Patient not taking: Reported on 3/15/2023) 30 tablet 11     No current facility-administered medications for this visit.       Review of patient's allergies indicates:  No Known Allergies    Well woman:  Pap test: 2022 normal/HPV neg.  History of abnormal paps: Yes - 30 years ago, cryosurgery.  History of STIs:  No  Mammogram: Date of last: 3/2022.  Result: Normal--scheduled  Colonoscopy: Date of last: 2016.  Result:  polyp.  Repeat due:  2021--scheduled next week.    DEXA:  Ordered  "per Dr. Hudson     ROS:  As per HPI.      Exam  /80 (BP Location: Left arm, Patient Position: Sitting, BP Method: Medium (Manual))   Ht 5' 8" (1.727 m)   Wt 75.5 kg (166 lb 7.2 oz)   LMP 10/17/2018 Comment:   2018  BMI 25.31 kg/m²   General: alert and oriented, no acute distress  Respiratory: normal respiratory effort  Abd: soft, non-tender, non-distended    Pelvic--deferred    Impression  1. Mixed incontinence urge and stress        2. Anxiety  Ambulatory referral/consult to Behavioral Health      3. Myalgia of pelvic floor            We reviewed the above issues and discussed options for short-term versus long-term management of her problems.   Plan:     1)  Mixed urinary incontinence, urge (mostly bothered by overactive bladder) > stress:    --Empty bladder every 3 hours.  Empty well: wait a minute, lean forward on toilet.    --Avoid dietary irritants (see sheet).  Keep diary x 3-5 days to determine your irritants.  --URGE: continue detrol mg mg daily.  Takes 2-4 weeks to see if will have effect.  For dry mouth: get sour, sugar free lozenge or gum.                --continue vaginal estrogen--treating vaginal dryness can help urinary urgency/frequency  --continue working on anxiety/stress--see if can talk to therapist about not internalizing stress  --STRESS:  Pessary vs. Sling.      2)  Pain with intercourse:  --treat vaginal atrophy (dryness):  Continue vaginal estrogen cream as directed.  Make sure to apply internally and use around opening/inner lips.               --treating may help pain with intercourse  --levator tenderness/tension on exam:              --complete pelvic floor PT  --use vaginal dilators around tense muscles 3-4 times / week  --continue pelvic floor relaxation exercises  --vaginal valium 5 mg/ lidocaine 2%/ baclofen 4% suppositories twice daily as needed     3)  Nocturia (nighttime urination):   --stop fluids 2 hours before bed.    --no water by bed  --If have leg swelling:  " Elevate feet above chest x 1 hour before bed to get excess fluid off.  Can also use support hose (knee highs).       4) anxiety  --I have placed orders for behavioral health tel:[phone]    5)RTC 1 year      I spent a total of 20 minutes on the day of the visit.    This includes face to face time and non-face to face time preparing to see the patient (eg, review of tests), obtaining and/or reviewing separately obtained history, documenting clinical information in the electronic or other health record, independently interpreting results and communicating results to the patient/family/caregiver, or care coordinator.   [facility]  Division of Female Pelvic Medicine and Reconstructive Surgery  Department of Obstetrics & Gynecology

## 2023-03-15 NOTE — TELEPHONE ENCOUNTER
No new care gaps identified.  [facility] Embedded Care Gaps. Reference number: [id number]. 3/15/2023   9:12:13 AM CDT

## 2023-04-03 ENCOUNTER — TELEPHONE (OUTPATIENT)
Dept: OBSTETRICS AND GYNECOLOGY | Facility: CLINIC | Age: 58
End: 2023-04-03
Payer: COMMERCIAL

## 2023-04-03 DIAGNOSIS — N95.0 POSTMENOPAUSAL BLEEDING: Primary | ICD-10-CM

## 2023-04-06 DIAGNOSIS — I10 BENIGN ESSENTIAL HYPERTENSION: ICD-10-CM

## 2023-04-11 ENCOUNTER — LAB VISIT (OUTPATIENT)
Dept: LAB | Facility: HOSPITAL | Age: 58
End: 2023-04-11
Attending: INTERNAL MEDICINE
Payer: COMMERCIAL

## 2023-04-11 ENCOUNTER — OFFICE VISIT (OUTPATIENT)
Dept: PRIMARY CARE CLINIC | Facility: CLINIC | Age: 58
End: 2023-04-11
Payer: COMMERCIAL

## 2023-04-11 ENCOUNTER — OFFICE VISIT (OUTPATIENT)
Dept: CARDIOLOGY | Facility: CLINIC | Age: 58
End: 2023-04-11
Payer: COMMERCIAL

## 2023-04-11 VITALS
BODY MASS INDEX: 25.62 KG/M2 | DIASTOLIC BLOOD PRESSURE: 80 MMHG | WEIGHT: 169.06 LBS | SYSTOLIC BLOOD PRESSURE: 140 MMHG | HEART RATE: 85 BPM | TEMPERATURE: 97 F | RESPIRATION RATE: 18 BRPM | HEIGHT: 68 IN | OXYGEN SATURATION: 98 %

## 2023-04-11 VITALS
BODY MASS INDEX: 25.64 KG/M2 | SYSTOLIC BLOOD PRESSURE: 145 MMHG | DIASTOLIC BLOOD PRESSURE: 87 MMHG | WEIGHT: 168.63 LBS

## 2023-04-11 DIAGNOSIS — I10 BENIGN ESSENTIAL HYPERTENSION: ICD-10-CM

## 2023-04-11 DIAGNOSIS — E78.2 MIXED HYPERLIPIDEMIA: Primary | ICD-10-CM

## 2023-04-11 DIAGNOSIS — R00.2 PALPITATIONS: ICD-10-CM

## 2023-04-11 DIAGNOSIS — I10 BENIGN ESSENTIAL HYPERTENSION: Primary | ICD-10-CM

## 2023-04-11 DIAGNOSIS — F33.0 DEPRESSION, MAJOR, RECURRENT, MILD: ICD-10-CM

## 2023-04-11 DIAGNOSIS — N39.3 SUI (STRESS URINARY INCONTINENCE, FEMALE): ICD-10-CM

## 2023-04-11 LAB
ANION GAP SERPL CALC-SCNC: 11 MMOL/L (ref 8–16)
BUN SERPL-MCNC: 15 MG/DL (ref 6–20)
CALCIUM SERPL-MCNC: 9.6 MG/DL (ref 8.7–10.5)
CHLORIDE SERPL-SCNC: 102 MMOL/L (ref 95–110)
CO2 SERPL-SCNC: 24 MMOL/L (ref 23–29)
CREAT SERPL-MCNC: 0.9 MG/DL (ref 0.5–1.4)
EST. GFR  (NO RACE VARIABLE): >60 ML/MIN/1.73 M^2
GLUCOSE SERPL-MCNC: 90 MG/DL (ref 70–110)
MAGNESIUM SERPL-MCNC: 1.9 MG/DL (ref 1.6–2.6)
POTASSIUM SERPL-SCNC: 4.7 MMOL/L (ref 3.5–5.1)
SODIUM SERPL-SCNC: 137 MMOL/L (ref 136–145)
TSH SERPL DL<=0.005 MIU/L-ACNC: 1.26 UIU/ML (ref 0.4–4)

## 2023-04-11 PROCEDURE — 83735 ASSAY OF MAGNESIUM: CPT | Performed by: INTERNAL MEDICINE

## 2023-04-11 PROCEDURE — 99204 PR OFFICE/OUTPT VISIT, NEW, LEVL IV, 45-59 MIN: ICD-10-PCS | Mod: S$GLB,,, | Performed by: INTERNAL MEDICINE

## 2023-04-11 PROCEDURE — 99204 OFFICE O/P NEW MOD 45 MIN: CPT | Mod: S$GLB,,, | Performed by: INTERNAL MEDICINE

## 2023-04-11 PROCEDURE — 80048 BASIC METABOLIC PNL TOTAL CA: CPT | Performed by: INTERNAL MEDICINE

## 2023-04-11 PROCEDURE — 99999 PR PBB SHADOW E&M-EST. PATIENT-LVL V: ICD-10-PCS | Mod: PBBFAC,,, | Performed by: INTERNAL MEDICINE

## 2023-04-11 PROCEDURE — 99999 PR PBB SHADOW E&M-EST. PATIENT-LVL IV: ICD-10-PCS | Mod: PBBFAC,,, | Performed by: INTERNAL MEDICINE

## 2023-04-11 PROCEDURE — 99214 OFFICE O/P EST MOD 30 MIN: CPT | Mod: S$GLB,,, | Performed by: INTERNAL MEDICINE

## 2023-04-11 PROCEDURE — 99999 PR PBB SHADOW E&M-EST. PATIENT-LVL V: CPT | Mod: PBBFAC,,, | Performed by: INTERNAL MEDICINE

## 2023-04-11 PROCEDURE — 36415 COLL VENOUS BLD VENIPUNCTURE: CPT | Performed by: INTERNAL MEDICINE

## 2023-04-11 PROCEDURE — 99214 PR OFFICE/OUTPT VISIT, EST, LEVL IV, 30-39 MIN: ICD-10-PCS | Mod: S$GLB,,, | Performed by: INTERNAL MEDICINE

## 2023-04-11 PROCEDURE — 84443 ASSAY THYROID STIM HORMONE: CPT | Performed by: INTERNAL MEDICINE

## 2023-04-11 PROCEDURE — 84244 ASSAY OF RENIN: CPT | Performed by: INTERNAL MEDICINE

## 2023-04-11 PROCEDURE — 99999 PR PBB SHADOW E&M-EST. PATIENT-LVL IV: CPT | Mod: PBBFAC,,, | Performed by: INTERNAL MEDICINE

## 2023-04-11 RX ORDER — ESCITALOPRAM OXALATE 20 MG/1
20 TABLET ORAL DAILY
COMMUNITY
End: 2023-06-23 | Stop reason: SDUPTHER

## 2023-04-11 NOTE — PROGRESS NOTES
[facility] Note    Chief Complaint      Chief Complaint   Patient presents with    Follow-up     6m       History of Present Illness      Sharri Hill is a 57 y.o. female who presents today for   Chief Complaint   Patient presents with    Follow-up     6m   .  Patient comes to appointment here fo 6 m checkup for chronic issues as below . She is dealing with blood pressure issues and  palpations . She is scheduled today to see Dr schaeffer . Her bladder symptoms are improved on detrol .     Problem List Items Addressed This Visit          Psychiatric    Depression, major, recurrent, mild    Overview     Cont lexapro to 20 mg  is feeling better . Stable               Cardiac/Vascular    Hyperlipidemia - Primary    Overview     Diet only cont same            Benign essential hypertension    Overview     Is on losartan 25 mg and hctz 25 mg , has appt with dr schaeffer today . Will defer to cards            Palpitations    Overview     holter reviewed . Cardiology to assess today              Renal/    SABRINA (stress urinary incontinence, female)    Overview     Now on detrol la cont per gyn                 Past Medical History:  Past Medical History:   Diagnosis Date    Abnormal Pap smear of cervix     Cryo Done ([facility])     Anxiety     Esophageal reflux     History of shingles ,     Right arm   and  Left hip     Hypertension     Menopause 2018    Migraine     Urge incontinence        Past Surgical History:  Past Surgical History:   Procedure Laterality Date    APPENDECTOMY       SECTION      COLONOSCOPY  [id number]    Polyp in the transverse. Repeat in 5 years    COSMETIC SURGERY      Nose    KNEE SURGERY         Family History:  family history includes Breast cancer in her maternal aunt; Coronary artery disease (age of onset: 60) in her father; Depression in her mother; Diabetes in her maternal grandfather and maternal grandmother; Heart disease  in her father; Hyperlipidemia in her father; Hypertension in her father; Kidney disease in her sister.    Social History:  Social History     Socioeconomic History    Marital status:    Tobacco Use    Smoking status: Never    Smokeless tobacco: Never   Substance and Sexual Activity    Alcohol use: Yes     Alcohol/week: 6.0 standard drinks     Types: 6 Glasses of wine per week     Comment: Something more    Drug use: No    Sexual activity: Yes     Partners: Male     Birth control/protection: Partner-Vasectomy, Post-menopausal     Comment:  , together since 40 years       Review of Systems:   Review of Systems   Constitutional:  Negative for fever and weight loss.   HENT:  Negative for congestion, hearing loss and sore throat.    Eyes:  Negative for blurred vision.   Respiratory:  Negative for cough and shortness of breath.    Cardiovascular:  Positive for palpitations. Negative for chest pain, claudication and leg swelling.   Gastrointestinal:  Negative for abdominal pain, constipation, diarrhea and heartburn.   Genitourinary:  Negative for dysuria.   Musculoskeletal:  Negative for back pain and myalgias.   Skin:  Negative for rash.   Neurological:  Negative for focal weakness and headaches.   Psychiatric/Behavioral:  Negative for depression and suicidal ideas. The patient is not nervous/anxious.        Medications:  Outpatient Encounter Medications as of 4/11/2023   Medication Sig Note Dispense Refill    butalbital-acetaminophen-caff -40 mg Cap        EScitalopram oxalate (LEXAPRO) 20 MG tablet Take 20 mg by mouth once daily.       estradioL (ESTRACE) 0.01 % (0.1 mg/gram) vaginal cream PLACE 1GM VAGINALLY 2-3 TIMES WEEKLY  42.5 g 2    fexofenadine HCl (ALLEGRA ORAL) Take by mouth.       fluticasone propionate (FLONASE) 50 mcg/actuation nasal spray  7/20/2020: Take as needed      gabapentin (NEURONTIN) 300 MG capsule Take 1 capsule by mouth 2 (two) times daily.       hydroCHLOROthiazide  (HYDRODIURIL) 25 MG tablet        HYDROcodone-acetaminophen (NORCO) 7.5-325 mg per tablet Take 1 tablet by mouth 2 (two) times daily as needed.       losartan (COZAAR) 25 MG tablet Take 25 mg by mouth.       nabumetone (RELAFEN) 750 MG tablet TAKE ONE TABLET BY MOUTH TWICE A DAY  60 tablet 0    progesterone (PROMETRIUM) 100 MG capsule Take 1 capsule by mouth 30-60 minutes before bed every night  90 capsule 0    PROTONIX 40 mg tablet Take 40 mg by mouth every morning.       sucralfate (CARAFATE) 1 gram tablet Take 1 g by mouth 3 (three) times daily.       tiZANidine (ZANAFLEX) 4 MG tablet Take 4 mg by mouth 2 (two) times daily.       tolterodine (DETROL LA) 4 MG 24 hr capsule Take 1 capsule (4 mg total) by mouth once daily.  30 capsule 11    XIIDRA 5 % Dpet        zolpidem (AMBIEN) 10 mg Tab TAKE ONE TABLET BY MOUTH AT BEDTIME AS NEEDED  30 tablet 0    [DISCONTINUED] EScitalopram oxalate (LEXAPRO) 10 MG tablet Take 2 tablets (20 mg total) by mouth once daily.  60 tablet 0    [DISCONTINUED] EScitalopram oxalate (LEXAPRO) 10 MG tablet TAKE TWO TABLETS BY MOUTH EVERY DAY (Patient not taking: Reported on 4/11/2023)  60 tablet 10    [DISCONTINUED] mirabegron (MYRBETRIQ) 50 mg Tb24 Take 1 tablet (50 mg total) by mouth once daily. (Patient not taking: Reported on 3/15/2023)  30 tablet 11    [DISCONTINUED] nabumetone (RELAFEN) 750 MG tablet TAKE ONE TABLET BY MOUTH TWICE A DAY  60 tablet 0    [DISCONTINUED] NIFEdipine (ADALAT CC) 60 MG TbSR Take 60 mg by mouth.       [DISCONTINUED] zolpidem (AMBIEN) 10 mg Tab TAKE ONE TABLET BY MOUTH AT BEDTIME AS NEEDED  30 tablet 0     No facility-administered encounter medications on file as of 4/11/2023.        Allergies:  Review of patient's allergies indicates:  No Known Allergies      Physical Exam         Vitals:    04/11/23 1434   BP: (!) 140/80   Pulse: 85   Resp: 18   Temp: 97.4 °F (36.3 °C)         Physical Exam  Constitutional:       Appearance: She is well-developed.   Eyes:       Pupils: Pupils are equal, round, and reactive to light.   Neck:      Thyroid: No thyromegaly.   Cardiovascular:      Rate and Rhythm: Normal rate.      Heart sounds: Normal heart sounds. No murmur heard.    No friction rub. No gallop.   Pulmonary:      Breath sounds: Normal breath sounds.   Abdominal:      General: Bowel sounds are normal.      Palpations: Abdomen is soft.   Musculoskeletal:         General: Normal range of motion.      Cervical back: Normal range of motion.   Lymphadenopathy:      Cervical: No cervical adenopathy.   Skin:     General: Skin is warm.      Findings: No rash.   Neurological:      Mental Status: She is alert and oriented to person, place, and time.      Cranial Nerves: No cranial nerve deficit.   Psychiatric:         Behavior: Behavior normal.        Laboratory:  CBC:  No results for input(s): WBC, RBC, HGB, HCT, PLT, MCV, MCH, MCHC in the last 2160 hours.  CMP:  No results for input(s): GLU, CALCIUM, ALBUMIN, PROT, NA, K, CO2, CL, BUN, ALKPHOS, ALT, AST, BILITOT in the last 2160 hours.    Invalid input(s): CREATININ  URINALYSIS:  No results for input(s): COLORU, CLARITYU, SPECGRAV, PHUR, PROTEINUA, GLUCOSEU, BILIRUBINCON, BLOODU, WBCU, RBCU, BACTERIA, MUCUS, NITRITE, LEUKOCYTESUR, UROBILINOGEN, HYALINECASTS in the last 2160 hours.   LIPIDS:  No results for input(s): TSH, HDL, CHOL, TRIG, LDLCALC, CHOLHDL, NONHDLCHOL, TOTALCHOLEST in the last 2160 hours.  TSH:  No results for input(s): TSH in the last 2160 hours.  A1C:  No results for input(s): HGBA1C in the last 2160 hours.    Radiology:        Assessment:     Sharri Hill is a 57 y.o.female with:    Mixed hyperlipidemia    SABRINA (stress urinary incontinence, female)    Depression, major, recurrent, mild    Benign essential hypertension    Palpitations          Plan:     Problem List Items Addressed This Visit          Psychiatric    Depression, major, recurrent, mild    Overview     Cont lexapro to 20 mg  is feeling better . Stable                Cardiac/Vascular    Hyperlipidemia - Primary    Overview     Diet only cont same            Benign essential hypertension    Overview     Is on losartan 25 mg and hctz 25 mg , has appt with dr schaeffer today . Will defer to cards            Palpitations    Overview     holter reviewed . Cardiology to assess today              Renal/    SABRINA (stress urinary incontinence, female)    Overview     Now on detrol la cont per gyn               As above, continue current medications and maintain follow up with specialists.  Return to clinic in 6 months.      [facility]er[facility] W Dantagnan [facility]

## 2023-04-11 NOTE — PATIENT INSTRUCTIONS
"I recommend the book, "The Obesity Code" for weight loss; it recommends intermittent fasting and avoidance of sugar, artificial sweeteners and refined carbohydrates.    Also, here is information on a Mediterranean type diet including fish, the pesco-mediterranean diet from the American College of Cardiology:    1.  Humans are evolutionarily adapted to obtain calories and nutrients from both plant and animal food sources. Many people overconsume animal products, often-processed meats high in saturated fats and chemical additives. In contrast, while strict veganism has gained popularity for many reasons and has value in certain groups, it can cause nutritional deficiencies (vitamin B12, high-quality proteins, iron, zinc, omega-3 fatty acid, vitamin D, and calcium), and predispose to osteopenia, loss of muscle mass, and anemia. This is not true of a lacto-ovo vegetarian diet, which allows no animal-based food except for eggs and dairy. A 6-year study of [id number] [place] [employer] reported a decreased incidence of all-cause mortality when comparing vegetarians with nonvegetarians. However, when the vegetarians were stratified into vegans, lacto-ovo vegetarians, pesco-vegetarians, and semi-vegetarians, the pesco-vegetarians had lowest risks for all-cause mortality, cardiovascular disease (CVD) mortality, and mortality from other causes.     2.  The authors propose a plant-rich diet rich in nuts with fish and seafood as the principle source of animal food. Known as the Pesco-Mediterranean diet, it is supplemented with extra-virgin olive oil (EVOO), which is the principle fat source, along with moderate amounts of dairy (particularly yogurt and cheese) and eggs, as well as modest amounts of alcohol consumption (ideally red wine with the evening meal), but few red and processed meats.     3.  Both epidemiological studies and randomized clinical trials indicate that the traditional Mediterranean diet is associated with " lower risks for all-cause and CVD mortality, coronary heart disease, metabolic syndrome, diabetes, cognitive decline, neurodegenerative diseases (including Alzheimers), depression, overall cancer mortality, and breast and colorectal cancers.     4.  The traditional Mediterranean diet has been endorsed in the most recent Dietary Guidelines for Americans and the American College of Cardiology/American Heart Association guidelines. The 2020 [employer] World Report ranked it #1 for overall health based upon it being nutritious, safe, relatively easy to follow, protective against CVD and diabetes, and effective for weight loss.     5.  Fish and seafood are important sources of vitamins protein and omega-3 fatty acids, of which the higher blood and adipose tissue are associated with reduced fatal and nonfatal myocardial infarction. When not fried, fish consumption has been associated with reduced risk of heart failure, and the incidence of the metabolic syndrome, coronary heart disease, ischemic stroke, and sudden cardiac death, particularly when seafood replaces less healthy foods.     6.  Unrestricted use of olive oil in the kitchen, on salads (with vinegar), cooking vegetables, and at the table is the foundation of the traditional Mediterranean diet, although olive oil quality is crucial, which makes it expensive. EVOO retains hydrophilic components of olives including highly bioactive polyphenols, which are believed to underlie many of EVOOs cardiometabolic benefits, such as reduced low-density lipoprotein cholesterol (LDL-C) and increased high-density lipoprotein cholesterol (HDL-C), improved vascular reactivity, enhanced HDL particle functionality, and a lower diabetes risk.     7.  Tree nuts, an integral component of the traditional Mediterranean diet, are nutrient dense rich in unsaturated fats, fiber, protein, polyphenols, phytosterols, and tocopherols. Nut consumption is associated with decreased incidence  and mortality rates from both CVD and coronary artery disease (CAD), as well as atrial fibrillation and diabetes. Randomized controlled trials have shown that diets enriched with nuts produce cardiometabolic benefits including improvements in insulin sensitivity, LDL-C, inflammation, and vascular reactivity. A 1-daily serving of mixed nuts resulted in a 28% reduction in CVD risk. Generous intake of nuts does not promote weight gain because of increased satiety and incomplete digestion.     8.  Legumes are an excellent source of vegetable protein, folate, and magnesium and fiber, and like other seeds including peanuts, are rich in polyphenols. Consumption of legumes has been linked to a reduced risk of incident and fatal CVD and CAD, as well as improvements in blood glucose, cholesterol, blood pressure, and body weight. Legumes, like fish, are a satiating and healthy substitute for red meat and processed meats.     9.  Dairy products and eggs are important sources of protein, nonsodium minerals, probiotics, and vitamin D. Although there is no clear consensus among nutrition experts on the role of dairy products in CVD risk, they are allowed in this Pesco-Mediterranean diet. Fermented low-fat versions, such as yogurt and soft cheeses, are preferred; butter and hard cheese are high in saturated fats and salt.     10.  Eggs are composed of beneficial nutrients including all essential amino acids, in addition to minerals (selenium, phosphorus, iodine, zinc), vitamins (A, D, B2, B12, niacin), and carotenoids (lutein, zeaxanthin). Although each yolk contains about 184 mg of dietary cholesterol, large prospective cohorts suggest that egg consumption is unrelated to serum cholesterol and does not increase CVD risk. Eggs are allowed in the Pesco-Mediterranean diet; egg whites are unlimited and preferably no more than 5 yolks/week.     11.  Whole grains, such as barley, whole oats, rye, corn, buckwheat, brown rice, and quinoa,  are an integral part of the traditional Mediterranean diet. Pasta is an example of a starchy food that has a low glycemic index despite being a refined carbohydrate. In the context of a low glycemic index dietary pattern such as the Mediterranean diet, pasta does not adversely affect adiposity and may even help reduce body weight and there is no evidence that pasta promotes cardiometabolic risk factors. White rice is associated with type 2 diabetes mellitus in Asians but not in Western cohorts, possibly because it is cooked and served plain in [place] and in Western cultures cooked in mixed dishes with vegetables and vegetable oil including EVOO.     12.  The staple beverage of the Pesco-Mediterranean diet is water--which can be flavored but not sweetened. Unsweetened tea and coffee are rich in antioxidants and are associated with improved CVD outcomes. If alcohol is consumed at all, dry red wine is recommended, with the ideal amount being a single glass (6 oz) for women and 1 or 2 glasses/day for men (6-12 oz) consumed with meals.     13.  Time-restricted eating, a type of intermittent fasting, is the practice of limiting the daily intake of calories to a window of time usually between 6-12 hours each day. Intermittent fasting when done on a regular basis has been shown to decrease intra-abdominal adipose tissue and reduce free-radical production. This elicits powerful cellular responses that improve glucose metabolism and reduce systemic inflammation, and may also reduce risks of diabetes, CVD, cancer, and neurodegenerative diseases. After a 12-hour overnight fast, insulin levels are typically low, and glycogen stores have been depleted. In this fasted state, the body starts mobilizing fatty acids from adipose cells to burn as metabolic fuel instead of glucose. This improves insulin sensitivity. Time-restricted eating is not more effective for weight loss than standard calorie-restriction, but appears to enhance CV  health even in nonobese people. Fasting may also lower blood pressure and resting heart rate and improve autonomic balance with augmented heart rate variability.     14.  The evidence regarding time-restricted eating is mostly based on animal models and observational human studies. The most popular form of time-restricted eating involves eating two rather than three meals and compressing the calorie-consumption window. No head-to-head studies have been performed to assess the optimal time window.

## 2023-04-11 NOTE — PROGRESS NOTES
Subjective:   04/11/2023     Patient ID:  Sharri Hill is a 57 y.o. female who presents for evaulation of hypeerlipidemia      Comes in for cardiac evaluation, she had new onset hypertension several months ago, she is felt poorly ever since then.  She is able to exercise without chest pains or tightness, PND or orthopnea.  A Holter monitor showed rare PACs and PVCs.  Renal function has been normal.  The hypertension did appear to be a fairly abrupt onset.  It may have been associated with a steroid shot.      She does not smoke cigarettes.  She drinks alcohol occasionally.  She has not been diagnosed with sleep apnea, but she does snore.    She has felt better on the current combination losartan HCT.    Prior note:   Sharri Hill is a 57 y.o. female with past medical history of hypertension who showed up to clinic for routine follow-up. Has been uncontrolled over the last few months, currently she is on nifedipine 60 mg and losartan 25 mg daily. She has not feeling well and she is fatigued all the time. Blood pressure check at home SBP 130s to 150s, she reported bilateral ankle edema.    Current Medications:  Current Outpatient Medications on File Prior to Visit   Medication Sig Dispense Refill   · butalbital-acetaminophen-caff -40 mg Cap capsule   · escitalopram oxalate (LEXAPRO) 10 MG tablet Take 10 mg daily by mouth   · escitalopram oxalate (LEXAPRO) 20 MG tablet Take 20 mg daily by mouth   · estradioL (ESTRACE) 0.01 % (0.1 mg/gram) vaginal cream   · gabapentin (NEURONTIN) 300 MG capsule Take 300 mg daily by mouth   · HYDROcodone-acetaminophen (NORCO) 7.5-325 mg per tablet Take 1 tablet as needed by mouth   · losartan (COZAAR) 25 MG tablet Take 1 tablet daily by mouth 30 tablet 11   · miSOPROStoL (CYTOTEC) 200 MCG tablet Take 200 mcg by mouth   · nabumetone (RELAFEN) 750 MG tablet Take 750 mg 2 (two) times daily by mouth   · pantoprazole (PROTONIX) 40 MG tablet Take 40 mg daily by mouth   ·  progesterone (PROMETRIUM) 100 MG capsule Take 100 mg daily by mouth   · promethazine (PHENERGAN) 25 MG tablet Take 25 mg as needed by mouth   · sucralfate (CARAFATE) 1 gram tablet Take 1 g as needed by mouth   · tiZANidine (ZANAFLEX) 4 MG tablet Take 4 mg 2 (two) times a day by mouth   · tolterodine (DETROL LA) 4 MG 24 hr capsule Take 4 mg daily by mouth   · XIIDRA 5 % Dpet   · zolpidem (AMBIEN) 10 mg tablet Take 10 mg daily by mouth   · [DISCONTINUED] NIFEdipine (ADALAT CC) 60 MG 24 hr tablet Take 1 tablet daily by mouth 30 tablet 5   · [DISCONTINUED] MYRBETRIQ 50 mg Tb24 Take 50 mg daily by mouth     No current facility-administered medications on file prior to visit.     PROBLEM LIST:     Encounter Diagnoses   Name Primary?   · Essential hypertension     Plan:     # uncontrolled hypertension. Will discontinue nifedipine and started on hydrochlorothiazide 25 mg daily in addition to losartan 25 mg daily. BMP ordered    Follow-up in 3 months    Past Medical History:   Diagnosis Date    Abnormal Pap smear of cervix 1992    Cryo Done ([place])     Anxiety     Esophageal reflux     History of shingles 2012, 2021    Right arm 2012  and  Left hip 2021    Hypertension     Menopause 2018    Migraine     Urge incontinence        Review of patient's allergies indicates:  No Known Allergies      Current Outpatient Medications:     EScitalopram oxalate (LEXAPRO) 20 MG tablet, Take 20 mg by mouth once daily., Disp: , Rfl:     estradioL (ESTRACE) 0.01 % (0.1 mg/gram) vaginal cream, PLACE 1GM VAGINALLY 2-3 TIMES WEEKLY, Disp: 42.5 g, Rfl: 2    gabapentin (NEURONTIN) 300 MG capsule, Take 1 capsule by mouth 2 (two) times daily., Disp: , Rfl:     HYDROcodone-acetaminophen (NORCO) 7.5-325 mg per tablet, Take 1 tablet by mouth 2 (two) times daily as needed., Disp: , Rfl:     losartan (COZAAR) 25 MG tablet, Take 25 mg by mouth., Disp: , Rfl:     progesterone (PROMETRIUM) 100 MG capsule, Take 1 capsule by mouth 30-60 minutes before bed  every night, Disp: 90 capsule, Rfl: 0    tiZANidine (ZANAFLEX) 4 MG tablet, Take 4 mg by mouth 2 (two) times daily., Disp: , Rfl:     tolterodine (DETROL LA) 4 MG 24 hr capsule, Take 1 capsule (4 mg total) by mouth once daily., Disp: 30 capsule, Rfl: 11    XIIDRA 5 % Dpet, , Disp: , Rfl:     zolpidem (AMBIEN) 10 mg Tab, TAKE ONE TABLET BY MOUTH AT BEDTIME AS NEEDED, Disp: 30 tablet, Rfl: 0    butalbital-acetaminophen-caff -40 mg Cap, , Disp: , Rfl:     fexofenadine HCl (ALLEGRA ORAL), Take by mouth., Disp: , Rfl:     fluticasone propionate (FLONASE) 50 mcg/actuation nasal spray, , Disp: , Rfl:     hydroCHLOROthiazide (HYDRODIURIL) 25 MG tablet, , Disp: , Rfl:     nabumetone (RELAFEN) 750 MG tablet, TAKE ONE TABLET BY MOUTH TWICE A DAY, Disp: 60 tablet, Rfl: 0    PROTONIX 40 mg tablet, Take 40 mg by mouth every morning., Disp: , Rfl:     sucralfate (CARAFATE) 1 gram tablet, Take 1 g by mouth 3 (three) times daily., Disp: , Rfl:      Objective:   Review of Systems   Cardiovascular:  Positive for dyspnea on exertion, irregular heartbeat and palpitations. Negative for chest pain, claudication, cyanosis, leg swelling, near-syncope, orthopnea, paroxysmal nocturnal dyspnea and syncope.       Vitals:    04/11/23 1523   BP: (!) 145/87     Wt Readings from Last 3 Encounters:   04/11/23 76.5 kg (168 lb 10.4 oz)   04/11/23 76.7 kg (169 lb 1.5 oz)   03/15/23 75.5 kg (166 lb 7.2 oz)     Temp Readings from Last 3 Encounters:   04/11/23 97.4 °F (36.3 °C) (Oral)   12/12/22 98.6 °F (37 °C) (Oral)   06/28/22 98.6 °F (37 °C) (Oral)     BP Readings from Last 3 Encounters:   04/11/23 (!) 145/87   04/11/23 (!) 140/80   03/15/23 132/80     Pulse Readings from Last 3 Encounters:   04/11/23 85   02/28/23 67   12/12/22 72             Physical Exam      Lab Results   Component Value Date    CHOL 214 (H) 12/22/2022    CHOL 256 (H) 03/30/2022    CHOL 239 (H) 02/09/2021     Lab Results   Component Value Date    HDL 78 (H) 12/22/2022    HDL  89 (H) 03/30/2022    HDL 90 (H) 02/09/2021     Lab Results   Component Value Date    LDLCALC 125 12/22/2022    LDLCALC 154.8 03/30/2022    LDLCALC 133.4 02/09/2021     Lab Results   Component Value Date    ALT 23 03/30/2022    AST 30 03/30/2022    AST 28 02/09/2021    AST 19 09/17/2019     Lab Results   Component Value Date    CREATININE 0.70 03/30/2022    BUN 16 03/30/2022     03/30/2022    K 4.0 03/30/2022    CO2 24 03/30/2022    CO2 31 (H) 02/09/2021    CO2 28 09/17/2019     Lab Results   Component Value Date    HGB 13.6 03/30/2022    HCT 40.9 03/30/2022    HCT 40.9 02/09/2021    HCT 40.3 09/17/2019                         Assessment and Plan:     Benign essential hypertension  -     Basic Metabolic Panel; Future; Expected date: 04/18/2023  -     Magnesium; Future; Expected date: 04/11/2023  -     TSH; Future; Expected date: 04/11/2023  -     Echo; Future; Expected date: 04/18/2023  -     Aldosterone/Renin Activity Ratio; Future; Expected date: 04/11/2023  -     Catecholamines, fractionated, Urine 24 Hours; Future; Expected date: 04/11/2023  -     Metanephrines, urine 24 Hours; Future; Expected date: 04/11/2023  -     VMA, urine 24 Hours; Future; Expected date: 04/11/2023  -     CV US Renal Artery Stenosis Hypertension Complete; Future    Palpitations       She comes in with fairly abrupt onset of hypertension.  Will do a workup for secondary hypertension.  She is feeling better on current medications, but continues to have rapid palpitations.  Depending upon the results of the above evaluation, will add a low-dose beta-blocker for these palpitations.      She does snore, consideration of sleep apnea should be given depending upon the results of the above-noted studies.      She is being also evaluated as a preop clearance for surgery in mid May.  Will assess studies and discussed.  Follow up in about 2 weeks (around 4/25/2023).          Future Appointments   Date Time Provider Department Center   6/1/2023  11:00 AM January Gloria MD [facility]   7/18/2023  3:30 PM [facility]

## 2023-04-15 LAB
ALDOST SERPL-MCNC: 12.6 NG/DL
ALDOST/RENIN PLAS-RTO: 1.4 RATIO
RENIN PLAS-CCNC: 8.8 NG/ML/HR

## 2023-04-17 DIAGNOSIS — G47.09 OTHER INSOMNIA: ICD-10-CM

## 2023-04-18 ENCOUNTER — HOSPITAL ENCOUNTER (OUTPATIENT)
Dept: CARDIOLOGY | Facility: HOSPITAL | Age: 58
Discharge: HOME | End: 2023-04-18
Attending: INTERNAL MEDICINE
Payer: COMMERCIAL

## 2023-04-18 DIAGNOSIS — I10 BENIGN ESSENTIAL HYPERTENSION: ICD-10-CM

## 2023-04-18 LAB
ABDOMINAL AORTA PROX EDV: 20 CM/S
ABDOMINAL AORTA PROX PSV: 107 CM/S
LEFT RENAL DIST DIAS: 24 CM/S
LEFT RENAL DIST SYS: 68 CM/S
LEFT RENAL MID DIAS: 45 CM/S
LEFT RENAL MID SYS: 137 CM/S
LEFT RENAL ORIGIN DIAS: 34 CM/S
LEFT RENAL ORIGIN SYS: 110 CM/S
LEFT RENAL PROX DIAS: 37 CM/S
LEFT RENAL PROX RAR: 1.01
LEFT RENAL PROX SYS: 108 CM/S
LEFT RENAL ULTRASOUND ACCELERATION TIME MEASUREMENT 1: 23 MS
LEFT RENAL ULTRASOUND ACCELERATION TIME MEASUREMENT 2: 37 MS
LEFT RENAL ULTRASOUND ACCELERATION TIME MEASUREMENT 3: 34 MS
LEFT RENAL ULTRASOUND ACCELERATION TIME MEASUREMENT AVERAGE: 37 MS
LEFT RENAL ULTRASOUND KIDNEY SIZE MEASUREMENT 1: 10.8 CM
LEFT RENAL ULTRASOUND KIDNEY SIZE MEASUREMENT AVERAGE: 10.8 CM
LEFT RENAL ULTRASOUND RESISTIVE INDEX MEASUREMENT 1: 0.63
LEFT RENAL ULTRASOUND RESISTIVE INDEX MEASUREMENT 2: 0.68
LEFT RENAL ULTRASOUND RESISTIVE INDEX MEASUREMENT 3: 0.53
LEFT RENAL ULTRASOUND RESISTIVE INDEX MEASUREMENT AVERAGE: 0.68
OHS CV LEFT RENAL RAR: 1.28
OHS CV RIGHT RENAL RAR: 1.39
OHS CV US LEFT RENAL HIGHEST EDV: 45
OHS CV US LEFT RENAL HIGHEST PSV: 137
OHS CV US RIGHT RENAL HIGHEST EDV: 49
OHS CV US RIGHT RENAL HIGHEST PSV: 149
RIGHT RENAL DIST DIAS: 20 CM/S
RIGHT RENAL DIST SYS: 53 CM/S
RIGHT RENAL MID DIAS: 47 CM/S
RIGHT RENAL MID SYS: 139 CM/S
RIGHT RENAL ORIGIN DIAS: 39 CM/S
RIGHT RENAL ORIGIN SYS: 115 CM/S
RIGHT RENAL PROX DIAS: 49 CM/S
RIGHT RENAL PROX RAR: 1.39
RIGHT RENAL PROX SYS: 149 CM/S
RIGHT RENAL ULTRASOUND ACCELERATION TIME MEASUREMENT 1: 29 MS
RIGHT RENAL ULTRASOUND ACCELERATION TIME MEASUREMENT 2: 37 MS
RIGHT RENAL ULTRASOUND ACCELERATION TIME MEASUREMENT 3: 37 MS
RIGHT RENAL ULTRASOUND ACCELERATION TIME MEASUREMENT AVERAGE: 37 MS
RIGHT RENAL ULTRASOUND KIDNEY SIZE MEASUREMENT 1: 10.5 CM
RIGHT RENAL ULTRASOUND KIDNEY SIZE MEASUREMENT AVERAGE: 10.5 CM
RIGHT RENAL ULTRASOUND RESISTIVE INDEX MEASUREMENT 1: 0.6
RIGHT RENAL ULTRASOUND RESISTIVE INDEX MEASUREMENT 2: 0.65
RIGHT RENAL ULTRASOUND RESISTIVE INDEX MEASUREMENT 3: 0.63
RIGHT RENAL ULTRASOUND RESISTIVE INDEX MEASUREMENT AVERAGE: 0.65

## 2023-04-18 PROCEDURE — 93975 CV US RENAL ARTERY STENOSIS HYPERTENSION COMPLETE (CUPID ONLY): ICD-10-PCS | Mod: 26,,, | Performed by: INTERNAL MEDICINE

## 2023-04-18 PROCEDURE — 93975 VASCULAR STUDY: CPT | Mod: 26,,, | Performed by: INTERNAL MEDICINE

## 2023-04-18 PROCEDURE — 93975 VASCULAR STUDY: CPT

## 2023-04-18 RX ORDER — ZOLPIDEM TARTRATE 10 MG/1
TABLET ORAL
Qty: 30 TABLET | Refills: 0 | Status: SHIPPED | OUTPATIENT
Start: 2023-04-18 | End: 2023-05-17

## 2023-04-18 NOTE — TELEPHONE ENCOUNTER
No new care gaps identified.  [facility] Embedded Care Gaps. Reference number: [id number]. 4/17/2023   9:59:22 PM CDT

## 2023-04-21 ENCOUNTER — HOSPITAL ENCOUNTER (OUTPATIENT)
Dept: CARDIOLOGY | Facility: HOSPITAL | Age: 58
Discharge: HOME | End: 2023-04-21
Attending: INTERNAL MEDICINE
Payer: COMMERCIAL

## 2023-04-21 VITALS — BODY MASS INDEX: 25.61 KG/M2 | HEIGHT: 68 IN | WEIGHT: 169 LBS

## 2023-04-21 DIAGNOSIS — I10 BENIGN ESSENTIAL HYPERTENSION: ICD-10-CM

## 2023-04-21 LAB
AORTIC ROOT ANNULUS: 2.73 CM
AORTIC VALVE CUSP SEPERATION: 1.92 CM
AV INDEX (PROSTH): 0.83
AV MEAN GRADIENT: 5 MMHG
AV PEAK GRADIENT: 9 MMHG
AV VALVE AREA: 2.64 CM2
AV VELOCITY RATIO: 0.86
BSA FOR ECHO PROCEDURE: 1.92 M2
CV ECHO LV RWT: 0.39 CM
DOP CALC AO PEAK VEL: 1.53 M/S
DOP CALC AO VTI: 32.6 CM
DOP CALC LVOT AREA: 3.2 CM2
DOP CALC LVOT DIAMETER: 2.02 CM
DOP CALC LVOT PEAK VEL: 1.32 M/S
DOP CALC LVOT STROKE VOLUME: 86.16 CM3
DOP CALC MV VTI: 31.1 CM
DOP CALCLVOT PEAK VEL VTI: 26.9 CM
E WAVE DECELERATION TIME: 212.47 MSEC
E/A RATIO: 1
E/E' RATIO: 8.27 M/S
ECHO LV POSTERIOR WALL: 0.87 CM (ref 0.6–1.1)
EJECTION FRACTION: 60 %
FRACTIONAL SHORTENING: 40 % (ref 28–44)
INTERVENTRICULAR SEPTUM: 0.92 CM (ref 0.6–1.1)
IVRT: 85.63 MSEC
LA MAJOR: 5.15 CM
LA MINOR: 5.3 CM
LA WIDTH: 4 CM
LEFT ATRIUM SIZE: 3.34 CM
LEFT ATRIUM VOLUME INDEX MOD: 25.3 ML/M2
LEFT ATRIUM VOLUME INDEX: 31.2 ML/M2
LEFT ATRIUM VOLUME MOD: 48.07 CM3
LEFT ATRIUM VOLUME: 59.32 CM3
LEFT INTERNAL DIMENSION IN SYSTOLE: 2.71 CM (ref 2.1–4)
LEFT VENTRICLE DIASTOLIC VOLUME INDEX: 48.19 ML/M2
LEFT VENTRICLE DIASTOLIC VOLUME: 91.57 ML
LEFT VENTRICLE MASS INDEX: 69 G/M2
LEFT VENTRICLE SYSTOLIC VOLUME INDEX: 14.4 ML/M2
LEFT VENTRICLE SYSTOLIC VOLUME: 27.3 ML
LEFT VENTRICULAR INTERNAL DIMENSION IN DIASTOLE: 4.48 CM (ref 3.5–6)
LEFT VENTRICULAR MASS: 130.87 G
LV LATERAL E/E' RATIO: 8.27 M/S
LV SEPTAL E/E' RATIO: 8.27 M/S
LVOT MG: 2.42 MMHG
LVOT MV: 0.67 CM/S
MV MEAN GRADIENT: 2 MMHG
MV PEAK A VEL: 0.91 M/S
MV PEAK E VEL: 0.91 M/S
MV PEAK GRADIENT: 3 MMHG
MV STENOSIS PRESSURE HALF TIME: 61.62 MS
MV VALVE AREA BY CONTINUITY EQUATION: 2.77 CM2
MV VALVE AREA P 1/2 METHOD: 3.57 CM2
OHS LV EJECTION FRACTION SIMPSONS BIPLANE MOD: 7 %
PISA MRMAX VEL: 4.76 M/S
PISA TR MAX VEL: 2.48 M/S
PV MV: 0.72 M/S
PV PEAK VELOCITY: 1.04 CM/S
RA MAJOR: 5.11 CM
RA PRESSURE: 3 MMHG
RIGHT VENTRICULAR END-DIASTOLIC DIMENSION: 2.48 CM
SINUS: 3.6 CM
TDI LATERAL: 0.11 M/S
TDI SEPTAL: 0.11 M/S
TDI: 0.11 M/S
TR MAX PG: 25 MMHG
TV REST PULMONARY ARTERY PRESSURE: 28 MMHG

## 2023-04-21 PROCEDURE — 93306 TTE W/DOPPLER COMPLETE: CPT

## 2023-04-21 PROCEDURE — 93306 TTE W/DOPPLER COMPLETE: CPT | Mod: 26,,, | Performed by: INTERNAL MEDICINE

## 2023-04-21 PROCEDURE — 93306 ECHO (CUPID ONLY): ICD-10-PCS | Mod: 26,,, | Performed by: INTERNAL MEDICINE

## 2023-04-26 ENCOUNTER — OFFICE VISIT (OUTPATIENT)
Dept: CARDIOLOGY | Facility: CLINIC | Age: 58
End: 2023-04-26
Payer: COMMERCIAL

## 2023-04-26 VITALS
BODY MASS INDEX: 25.46 KG/M2 | HEART RATE: 74 BPM | SYSTOLIC BLOOD PRESSURE: 132 MMHG | DIASTOLIC BLOOD PRESSURE: 83 MMHG | WEIGHT: 168 LBS | HEIGHT: 68 IN

## 2023-04-26 DIAGNOSIS — R00.2 PALPITATIONS: ICD-10-CM

## 2023-04-26 DIAGNOSIS — I10 BENIGN ESSENTIAL HYPERTENSION: ICD-10-CM

## 2023-04-26 DIAGNOSIS — Z01.818 PREOPERATIVE CLEARANCE: Primary | ICD-10-CM

## 2023-04-26 DIAGNOSIS — E78.2 MIXED HYPERLIPIDEMIA: ICD-10-CM

## 2023-04-26 PROCEDURE — 99999 PR PBB SHADOW E&M-EST. PATIENT-LVL IV: ICD-10-PCS | Mod: PBBFAC,,, | Performed by: INTERNAL MEDICINE

## 2023-04-26 PROCEDURE — 99214 PR OFFICE/OUTPT VISIT, EST, LEVL IV, 30-39 MIN: ICD-10-PCS | Mod: S$GLB,,, | Performed by: INTERNAL MEDICINE

## 2023-04-26 PROCEDURE — 99214 OFFICE O/P EST MOD 30 MIN: CPT | Mod: S$GLB,,, | Performed by: INTERNAL MEDICINE

## 2023-04-26 PROCEDURE — 99999 PR PBB SHADOW E&M-EST. PATIENT-LVL IV: CPT | Mod: PBBFAC,,, | Performed by: INTERNAL MEDICINE

## 2023-04-26 RX ORDER — LOSARTAN POTASSIUM 50 MG/1
50 TABLET ORAL DAILY
Qty: 90 TABLET | Refills: 4 | Status: SHIPPED | OUTPATIENT
Start: 2023-04-26 | End: 2023-08-31 | Stop reason: SDUPTHER

## 2023-04-26 RX ORDER — LANOLIN ALCOHOL/MO/W.PET/CERES
CREAM (GRAM) TOPICAL
Refills: 0
Start: 2023-04-26 | End: 2023-07-13

## 2023-04-26 NOTE — PROGRESS NOTES
Subjective:   04/26/2023     Patient ID:  Sharri Hill is a 57 y.o. female who presents for evaulation of Follow-up      Here for preop clearance and evaluation for hypertension.  She had a workup for secondary causes of hypertension which was negative.  Her blood pressures at home have been running proximally 140/72.  Still slightly elevated on losartan 25 and HCTZ 25 mg daily.  She had previously had problem with hyperkalemia on losartan alone.  Her last potassium was 4.2 on that combination.      She does not have chest pains tightness PND orthopnea.      She is scheduled for gyn surgery    The 10-year ASCVD risk score (Delroy JUNG, et al., 2019) is: 3.1%    Values used to calculate the score:      Age: 57 years      Sex: Female      Is Non- : No      Diabetic: No      Tobacco smoker: No      Systolic Blood Pressure: 132 mmHg      Is BP treated: Yes      HDL Cholesterol: 78 mg/dL      Total Cholesterol: 214 mg/dL        Prior note:    Comes in for cardiac evaluation, she had new onset hypertension several months ago, she is felt poorly ever since then.  She is able to exercise without chest pains or tightness, PND or orthopnea.  A Holter monitor showed rare PACs and PVCs.  Renal function has been normal.  The hypertension did appear to be a fairly abrupt onset.  It may have been associated with a steroid shot.      She does not smoke cigarettes.  She drinks alcohol occasionally.  She has not been diagnosed with sleep apnea, but she does snore.    She has felt better on the current combination losartan HCT.    Prior note:   Sharri Hill is a 57 y.o. female with past medical history of hypertension who showed up to clinic for routine follow-up. Has been uncontrolled over the last few months, currently she is on nifedipine 60 mg and losartan 25 mg daily. She has not feeling well and she is fatigued all the time. Blood pressure check at home SBP 130s to 150s, she reported bilateral ankle  edema.    Current Medications:  Current Outpatient Medications on File Prior to Visit   Medication Sig Dispense Refill   · butalbital-acetaminophen-caff -40 mg Cap capsule   · escitalopram oxalate (LEXAPRO) 10 MG tablet Take 10 mg daily by mouth   · escitalopram oxalate (LEXAPRO) 20 MG tablet Take 20 mg daily by mouth   · estradioL (ESTRACE) 0.01 % (0.1 mg/gram) vaginal cream   · gabapentin (NEURONTIN) 300 MG capsule Take 300 mg daily by mouth   · HYDROcodone-acetaminophen (NORCO) 7.5-325 mg per tablet Take 1 tablet as needed by mouth   · losartan (COZAAR) 25 MG tablet Take 1 tablet daily by mouth 30 tablet 11   · miSOPROStoL (CYTOTEC) 200 MCG tablet Take 200 mcg by mouth   · nabumetone (RELAFEN) 750 MG tablet Take 750 mg 2 (two) times daily by mouth   · pantoprazole (PROTONIX) 40 MG tablet Take 40 mg daily by mouth   · progesterone (PROMETRIUM) 100 MG capsule Take 100 mg daily by mouth   · promethazine (PHENERGAN) 25 MG tablet Take 25 mg as needed by mouth   · sucralfate (CARAFATE) 1 gram tablet Take 1 g as needed by mouth   · tiZANidine (ZANAFLEX) 4 MG tablet Take 4 mg 2 (two) times a day by mouth   · tolterodine (DETROL LA) 4 MG 24 hr capsule Take 4 mg daily by mouth   · XIIDRA 5 % Dpet   · zolpidem (AMBIEN) 10 mg tablet Take 10 mg daily by mouth   · [DISCONTINUED] NIFEdipine (ADALAT CC) 60 MG 24 hr tablet Take 1 tablet daily by mouth 30 tablet 5   · [DISCONTINUED] MYRBETRIQ 50 mg Tb24 Take 50 mg daily by mouth     No current facility-administered medications on file prior to visit.     PROBLEM LIST:     Encounter Diagnoses   Name Primary?   · Essential hypertension     Plan:     # uncontrolled hypertension. Will discontinue nifedipine and started on hydrochlorothiazide 25 mg daily in addition to losartan 25 mg daily. BMP ordered    Follow-up in 3 months    Past Medical History:   Diagnosis Date    Abnormal Pap smear of cervix 1992    Cryo Done ([facility])     Anxiety     Esophageal reflux     History of  shingles 2012, 2021    Right arm 2012  and  Left hip 2021    Menopause 2018    Migraine     Urge incontinence        Review of patient's allergies indicates:  No Known Allergies      Current Outpatient Medications:     butalbital-acetaminophen-caff -40 mg Cap, , Disp: , Rfl:     EScitalopram oxalate (LEXAPRO) 20 MG tablet, Take 20 mg by mouth once daily., Disp: , Rfl:     estradioL (ESTRACE) 0.01 % (0.1 mg/gram) vaginal cream, PLACE 1GM VAGINALLY 2-3 TIMES WEEKLY, Disp: 42.5 g, Rfl: 2    fexofenadine HCl (ALLEGRA ORAL), Take by mouth., Disp: , Rfl:     fluticasone propionate (FLONASE) 50 mcg/actuation nasal spray, , Disp: , Rfl:     gabapentin (NEURONTIN) 300 MG capsule, Take 1 capsule by mouth 2 (two) times daily., Disp: , Rfl:     hydroCHLOROthiazide (HYDRODIURIL) 25 MG tablet, , Disp: , Rfl:     HYDROcodone-acetaminophen (NORCO) 7.5-325 mg per tablet, Take 1 tablet by mouth 2 (two) times daily as needed., Disp: , Rfl:     losartan (COZAAR) 25 MG tablet, Take 25 mg by mouth., Disp: , Rfl:     nabumetone (RELAFEN) 750 MG tablet, TAKE ONE TABLET BY MOUTH TWICE A DAY, Disp: 60 tablet, Rfl: 0    progesterone (PROMETRIUM) 100 MG capsule, Take 1 capsule by mouth 30-60 minutes before bed every night, Disp: 90 capsule, Rfl: 0    PROTONIX 40 mg tablet, Take 40 mg by mouth every morning., Disp: , Rfl:     sucralfate (CARAFATE) 1 gram tablet, Take 1 g by mouth 3 (three) times daily., Disp: , Rfl:     tiZANidine (ZANAFLEX) 4 MG tablet, Take 4 mg by mouth 2 (two) times daily., Disp: , Rfl:     tolterodine (DETROL LA) 4 MG 24 hr capsule, Take 1 capsule (4 mg total) by mouth once daily., Disp: 30 capsule, Rfl: 11    XIIDRA 5 % Dpet, , Disp: , Rfl:     zolpidem (AMBIEN) 10 mg Tab, TAKE ONE TABLET BY MOUTH AT BEDTIME AS NEEDED, Disp: 30 tablet, Rfl: 0    magnesium oxide (MAG-OX) 400 mg (241.3 mg magnesium) tablet, Take 1 twice a day for 1 month, then 1 daily; if diarrhea occurs, decrease dose, Disp: , Rfl: 0     Objective:    Review of Systems   Cardiovascular:  Positive for irregular heartbeat and palpitations. Negative for chest pain, claudication, cyanosis, dyspnea on exertion, leg swelling, near-syncope, orthopnea, paroxysmal nocturnal dyspnea and syncope.       Vitals:    04/26/23 1023   BP: 132/83   Pulse: 74     Wt Readings from Last 3 Encounters:   04/26/23 76.2 kg (167 lb 15.9 oz)   04/21/23 76.7 kg (169 lb)   04/11/23 76.5 kg (168 lb 10.4 oz)     Temp Readings from Last 3 Encounters:   04/11/23 97.4 °F (36.3 °C) (Oral)   12/12/22 98.6 °F (37 °C) (Oral)   06/28/22 98.6 °F (37 °C) (Oral)     BP Readings from Last 3 Encounters:   04/26/23 132/83   04/11/23 (!) 145/87   04/11/23 (!) 140/80     Pulse Readings from Last 3 Encounters:   04/26/23 74   04/11/23 85   02/28/23 67             Physical Exam  Vitals reviewed.   Constitutional:       General: She is not in acute distress.     Appearance: She is well-developed.   HENT:      Head: Normocephalic and atraumatic.      Nose: Nose normal.   Eyes:      Conjunctiva/sclera: Conjunctivae normal.      Pupils: Pupils are equal, round, and reactive to light.   Neck:      Vascular: No JVD.   Cardiovascular:      Rate and Rhythm: Normal rate and regular rhythm.      Pulses: Normal pulses and intact distal pulses.      Heart sounds: Normal heart sounds. No murmur heard.    No friction rub. No gallop.   Pulmonary:      Effort: Pulmonary effort is normal. No respiratory distress.      Breath sounds: Normal breath sounds. No wheezing or rales.   Chest:      Chest wall: No tenderness.   Abdominal:      General: Bowel sounds are normal. There is no distension.      Palpations: Abdomen is soft.      Tenderness: There is no abdominal tenderness.   Musculoskeletal:         General: No tenderness or deformity. Normal range of motion.      Cervical back: Normal range of motion and neck supple.      Right lower leg: No edema.      Left lower leg: No edema.   Skin:     General: Skin is warm and dry.       Findings: No erythema or rash.   Neurological:      Mental Status: She is alert and oriented to person, place, and time.      Cranial Nerves: No cranial nerve deficit.      Motor: No abnormal muscle tone.      Coordination: Coordination normal.   Psychiatric:         Behavior: Behavior normal.         Thought Content: Thought content normal.         Judgment: Judgment normal.         Lab Results   Component Value Date    CHOL 214 (H) 12/22/2022    CHOL 256 (H) 03/30/2022    CHOL 239 (H) 02/09/2021     Lab Results   Component Value Date    HDL 78 (H) 12/22/2022    HDL 89 (H) 03/30/2022    HDL 90 (H) 02/09/2021     Lab Results   Component Value Date    LDLCALC 125 12/22/2022    LDLCALC 154.8 03/30/2022    LDLCALC 133.4 02/09/2021     Lab Results   Component Value Date    ALT 23 03/30/2022    AST 30 03/30/2022    AST 28 02/09/2021    AST 19 09/17/2019     Lab Results   Component Value Date    CREATININE 0.9 04/11/2023    BUN 15 04/11/2023     04/11/2023    K 4.7 04/11/2023    CO2 24 04/11/2023    CO2 24 03/30/2022    CO2 31 (H) 02/09/2021     Lab Results   Component Value Date    HGB 13.6 03/30/2022    HCT 40.9 03/30/2022    HCT 40.9 02/09/2021    HCT 40.3 09/17/2019                         Assessment and Plan:     Preoperative clearance  Comments:  Cleared for surgery, appears to be low risk    Benign essential hypertension  Comments:  Increase losartan to 50 mg daily, continue HCT, check BMP in 2 weeks  Orders:  -     Basic Metabolic Panel; Future; Expected date: 05/10/2023    Mixed hyperlipidemia  Comments:  Overall cardiac risk appears low    Palpitations  Comments:  Try magnesium oxide  Orders:  -     magnesium oxide (MAG-OX) 400 mg (241.3 mg magnesium) tablet; Take 1 twice a day for 1 month, then 1 daily; if diarrhea occurs, decrease dose; Refill: 0         Follow up in about 1 year (around 4/26/2024).          Future Appointments   Date Time Provider Department Center   6/1/2023 11:00 AM January DENTON  MD [facility]   7/18/2023  3:30 PM [facility]

## 2023-04-28 DIAGNOSIS — M17.12 PRIMARY OSTEOARTHRITIS OF LEFT KNEE: ICD-10-CM

## 2023-04-28 RX ORDER — NABUMETONE 750 MG/1
TABLET, FILM COATED ORAL
Qty: 60 TABLET | Refills: 0 | Status: SHIPPED | OUTPATIENT
Start: 2023-04-28 | End: 2023-05-25

## 2023-05-10 ENCOUNTER — ANESTHESIA EVENT (OUTPATIENT)
Dept: SURGERY | Facility: HOSPITAL | Age: 58
End: 2023-05-10
Payer: COMMERCIAL

## 2023-05-10 NOTE — ANESTHESIA PREPROCEDURE EVALUATION
05/10/2023  Sharri Hill is a 57 y.o., female.      Pre-op Assessment    I have reviewed the Patient Summary Reports.    I have reviewed the NPO Status.   I have reviewed the Medications.     Review of Systems  Anesthesia Hx:  Denies Family Hx of Anesthesia complications.   Denies Personal Hx of Anesthesia complications.   Social:  Social Alcohol Use    EENT/Dental:   Sinus allergy   Cardiovascular:   Hypertension Dysrhythmias (PVCs,PACs) hyperlipidemia NYHA Classification II palpitations   Pulmonary:   Sleep Apnea (symptoms )    Hepatic/GI:   GERD    Musculoskeletal:   Arthritis  C2-3  C3-4  C5-6  As per pt  Left shoulder numbness occ Spine Disorders: cervical Chronic Pain, Disc disease and Degenerative disease    Neurological:   Neuromuscular Disease, Headaches    Psych:   Psychiatric History anxiety depression         1992      Cryo Done ([facility])    Abnormal Pap smear of cervix Anxiety      Esophageal reflux      History of shingles 2012, 2021     Right arm 2012  and  Left hip 2021    Menopause 2018    Migraine      Urge incontinence          Physical Exam  General: Well nourished, Cooperative, Alert and Oriented    Airway:  Mallampati: II   Mouth Opening: Normal  TM Distance: Normal  Tongue: Normal  Neck ROM: Extension Decreased    Dental:  Intact, Prominent Incisors  High arched palate      Anesthesia Plan  Type of Anesthesia, risks & benefits discussed:    Anesthesia Type: Gen ETT  Intra-op Monitoring Plan: Standard ASA Monitors  Post Op Pain Control Plan: multimodal analgesia and IV/PO Opioids PRN  Induction:  IV  Informed Consent: Informed consent signed with the Patient and all parties understand the risks and agree with anesthesia plan.  All questions answered.   ASA Score: 3  Day of Surgery Review of History & Physical: H&P Update referred to the surgeon/provider.I have interviewed and  examined the patient. I have reviewed the patient's H&P dated: There are no significant changes. H&P completed by Anesthesiologist.  Anesthesia Plan Notes: Neutral neck position    Ready For Surgery From Anesthesia Perspective.     .

## 2023-05-10 NOTE — PRE-PROCEDURE INSTRUCTIONS
Following instructions given via phone:    On the day of surgery please report to [facility] located at [address] [place] [address].  Please park in the lot in front of the building and enter at the main entrance. Proceed to the second floor for registration.    Arrival time: 0600    You may not drive home after your procedure. You may not leave alone in an uber, taxi, etc.  You must be discharged to a responsible adult.  If possible, please have your visitor &/or ride home stay during your visit.   The surgeon should speak with your visitors after your procedure.  All visitors must be 18 years of age or older. Please limit your visitors to max of 2 people.  Have visitors bring snacks &/or lunch as the facility only has drink vending machines.    No food, no drink after midnight.  Take the following medications the morning of your procedure: see med list, take with water    If applicable, do not shave the surgical site 5 days prior to your procedure.  Shower the night before and the morning of your procedure with antibacterial soap.  Do not apply any products to your skin nor your hair after you shower the morning of your procedure.  Wear loose, comfortable clothing.  No jewelry. No contacts. Bring glasses if necessary.  If you have dentures, bring a case.  Leave all valuables at home.

## 2023-05-11 ENCOUNTER — TELEPHONE (OUTPATIENT)
Dept: OBSTETRICS AND GYNECOLOGY | Facility: CLINIC | Age: 58
End: 2023-05-11
Payer: COMMERCIAL

## 2023-05-11 ENCOUNTER — ANESTHESIA (OUTPATIENT)
Dept: SURGERY | Facility: HOSPITAL | Age: 58
End: 2023-05-11
Payer: COMMERCIAL

## 2023-05-11 ENCOUNTER — HOSPITAL ENCOUNTER (OUTPATIENT)
Facility: HOSPITAL | Age: 58
Discharge: HOME | End: 2023-05-11
Attending: OBSTETRICS & GYNECOLOGY | Admitting: OBSTETRICS & GYNECOLOGY
Payer: COMMERCIAL

## 2023-05-11 VITALS
HEART RATE: 56 BPM | TEMPERATURE: 98 F | RESPIRATION RATE: 16 BRPM | OXYGEN SATURATION: 100 % | DIASTOLIC BLOOD PRESSURE: 59 MMHG | SYSTOLIC BLOOD PRESSURE: 126 MMHG

## 2023-05-11 DIAGNOSIS — N95.0 PMB (POSTMENOPAUSAL BLEEDING): Primary | ICD-10-CM

## 2023-05-11 DIAGNOSIS — N95.0 POSTMENOPAUSAL BLEEDING: ICD-10-CM

## 2023-05-11 PROCEDURE — 63600175 PHARM REV CODE 636 W HCPCS: Performed by: ANESTHESIOLOGY

## 2023-05-11 PROCEDURE — 58558 HYSTEROSCOPY BIOPSY: CPT | Mod: ,,, | Performed by: OBSTETRICS & GYNECOLOGY

## 2023-05-11 PROCEDURE — 25000003 PHARM REV CODE 250: Performed by: ANESTHESIOLOGY

## 2023-05-11 PROCEDURE — D9220A PRA ANESTHESIA: Mod: ,,, | Performed by: REGISTERED NURSE

## 2023-05-11 PROCEDURE — 36000707: Performed by: OBSTETRICS & GYNECOLOGY

## 2023-05-11 PROCEDURE — 25000003 PHARM REV CODE 250: Performed by: REGISTERED NURSE

## 2023-05-11 PROCEDURE — 88305 TISSUE EXAM BY PATHOLOGIST: CPT | Mod: 26,,, | Performed by: PATHOLOGY

## 2023-05-11 PROCEDURE — 94761 N-INVAS EAR/PLS OXIMETRY MLT: CPT

## 2023-05-11 PROCEDURE — 71000015 HC POSTOP RECOV 1ST HR: Performed by: OBSTETRICS & GYNECOLOGY

## 2023-05-11 PROCEDURE — 36000706: Performed by: OBSTETRICS & GYNECOLOGY

## 2023-05-11 PROCEDURE — 88305 TISSUE EXAM BY PATHOLOGIST: ICD-10-PCS | Mod: 26,,, | Performed by: PATHOLOGY

## 2023-05-11 PROCEDURE — 27201423 OPTIME MED/SURG SUP & DEVICES STERILE SUPPLY: Performed by: OBSTETRICS & GYNECOLOGY

## 2023-05-11 PROCEDURE — 99900035 HC TECH TIME PER 15 MIN (STAT)

## 2023-05-11 PROCEDURE — D9220A PRA ANESTHESIA: ICD-10-PCS | Mod: ,,, | Performed by: REGISTERED NURSE

## 2023-05-11 PROCEDURE — 63600175 PHARM REV CODE 636 W HCPCS: Performed by: REGISTERED NURSE

## 2023-05-11 PROCEDURE — 25000003 PHARM REV CODE 250: Performed by: OBSTETRICS & GYNECOLOGY

## 2023-05-11 PROCEDURE — 27200651 HC AIRWAY, LMA: Performed by: ANESTHESIOLOGY

## 2023-05-11 PROCEDURE — 37000008 HC ANESTHESIA 1ST 15 MINUTES: Performed by: OBSTETRICS & GYNECOLOGY

## 2023-05-11 PROCEDURE — 71000033 HC RECOVERY, INTIAL HOUR: Performed by: OBSTETRICS & GYNECOLOGY

## 2023-05-11 PROCEDURE — 88305 TISSUE EXAM BY PATHOLOGIST: CPT | Performed by: PATHOLOGY

## 2023-05-11 PROCEDURE — 37000009 HC ANESTHESIA EA ADD 15 MINS: Performed by: OBSTETRICS & GYNECOLOGY

## 2023-05-11 PROCEDURE — 58558 PR HYSTEROSCOPY,W/ENDO BX: ICD-10-PCS | Mod: ,,, | Performed by: OBSTETRICS & GYNECOLOGY

## 2023-05-11 PROCEDURE — 63600175 PHARM REV CODE 636 W HCPCS: Performed by: OBSTETRICS & GYNECOLOGY

## 2023-05-11 RX ORDER — DIAZEPAM 5 MG/1
5 TABLET ORAL ONCE
Status: COMPLETED | OUTPATIENT
Start: 2023-05-11 | End: 2023-05-11

## 2023-05-11 RX ORDER — GABAPENTIN 300 MG/1
600 CAPSULE ORAL
Status: COMPLETED | OUTPATIENT
Start: 2023-05-11 | End: 2023-05-11

## 2023-05-11 RX ORDER — ONDANSETRON 2 MG/ML
INJECTION INTRAMUSCULAR; INTRAVENOUS
Status: DISCONTINUED | OUTPATIENT
Start: 2023-05-11 | End: 2023-05-11

## 2023-05-11 RX ORDER — SODIUM CHLORIDE, SODIUM LACTATE, POTASSIUM CHLORIDE, CALCIUM CHLORIDE 600; 310; 30; 20 MG/100ML; MG/100ML; MG/100ML; MG/100ML
INJECTION, SOLUTION INTRAVENOUS CONTINUOUS
Status: DISCONTINUED | OUTPATIENT
Start: 2023-05-11 | End: 2023-05-11 | Stop reason: HOSPADM

## 2023-05-11 RX ORDER — KETOROLAC TROMETHAMINE 30 MG/ML
INJECTION, SOLUTION INTRAMUSCULAR; INTRAVENOUS
Status: DISCONTINUED | OUTPATIENT
Start: 2023-05-11 | End: 2023-05-11

## 2023-05-11 RX ORDER — FENTANYL CITRATE 50 UG/ML
INJECTION, SOLUTION INTRAMUSCULAR; INTRAVENOUS
Status: DISCONTINUED | OUTPATIENT
Start: 2023-05-11 | End: 2023-05-11

## 2023-05-11 RX ORDER — MIDAZOLAM HYDROCHLORIDE 1 MG/ML
INJECTION INTRAMUSCULAR; INTRAVENOUS
Status: DISCONTINUED | OUTPATIENT
Start: 2023-05-11 | End: 2023-05-11

## 2023-05-11 RX ORDER — PROPOFOL 10 MG/ML
VIAL (ML) INTRAVENOUS
Status: DISCONTINUED | OUTPATIENT
Start: 2023-05-11 | End: 2023-05-11

## 2023-05-11 RX ORDER — HYDROMORPHONE HYDROCHLORIDE 1 MG/ML
1 INJECTION, SOLUTION INTRAMUSCULAR; INTRAVENOUS; SUBCUTANEOUS EVERY 4 HOURS PRN
Status: DISCONTINUED | OUTPATIENT
Start: 2023-05-11 | End: 2023-05-11 | Stop reason: HOSPADM

## 2023-05-11 RX ORDER — ACETAMINOPHEN 500 MG
1000 TABLET ORAL ONCE
Status: COMPLETED | OUTPATIENT
Start: 2023-05-11 | End: 2023-05-11

## 2023-05-11 RX ORDER — DEXAMETHASONE SODIUM PHOSPHATE 4 MG/ML
INJECTION, SOLUTION INTRA-ARTICULAR; INTRALESIONAL; INTRAMUSCULAR; INTRAVENOUS; SOFT TISSUE
Status: DISCONTINUED | OUTPATIENT
Start: 2023-05-11 | End: 2023-05-11

## 2023-05-11 RX ORDER — KETOROLAC TROMETHAMINE 30 MG/ML
30 INJECTION, SOLUTION INTRAMUSCULAR; INTRAVENOUS ONCE AS NEEDED
Status: DISCONTINUED | OUTPATIENT
Start: 2023-05-11 | End: 2023-05-11 | Stop reason: HOSPADM

## 2023-05-11 RX ORDER — NAPROXEN 500 MG/1
500 TABLET ORAL 2 TIMES DAILY
Qty: 30 TABLET | Refills: 0 | Status: SHIPPED | OUTPATIENT
Start: 2023-05-11 | End: 2023-05-26

## 2023-05-11 RX ORDER — ONDANSETRON 8 MG/1
8 TABLET, ORALLY DISINTEGRATING ORAL EVERY 8 HOURS PRN
Status: DISCONTINUED | OUTPATIENT
Start: 2023-05-11 | End: 2023-05-11 | Stop reason: HOSPADM

## 2023-05-11 RX ORDER — DIPHENHYDRAMINE HCL 25 MG
25 CAPSULE ORAL EVERY 4 HOURS PRN
Status: DISCONTINUED | OUTPATIENT
Start: 2023-05-11 | End: 2023-05-11 | Stop reason: HOSPADM

## 2023-05-11 RX ORDER — IBUPROFEN 200 MG
600 TABLET ORAL EVERY 6 HOURS PRN
Status: DISCONTINUED | OUTPATIENT
Start: 2023-05-11 | End: 2023-05-11 | Stop reason: HOSPADM

## 2023-05-11 RX ORDER — HYDROCODONE BITARTRATE AND ACETAMINOPHEN 5; 325 MG/1; MG/1
1 TABLET ORAL EVERY 4 HOURS PRN
Status: DISCONTINUED | OUTPATIENT
Start: 2023-05-11 | End: 2023-05-11 | Stop reason: HOSPADM

## 2023-05-11 RX ORDER — HYDROCODONE BITARTRATE AND ACETAMINOPHEN 5; 325 MG/1; MG/1
1 TABLET ORAL ONCE AS NEEDED
Status: DISCONTINUED | OUTPATIENT
Start: 2023-05-11 | End: 2023-05-11

## 2023-05-11 RX ORDER — FAMOTIDINE 20 MG/1
20 TABLET, FILM COATED ORAL
Status: COMPLETED | OUTPATIENT
Start: 2023-05-11 | End: 2023-05-11

## 2023-05-11 RX ORDER — ONDANSETRON 2 MG/ML
4 INJECTION INTRAMUSCULAR; INTRAVENOUS ONCE AS NEEDED
Status: DISCONTINUED | OUTPATIENT
Start: 2023-05-11 | End: 2023-05-11

## 2023-05-11 RX ORDER — LIDOCAINE HYDROCHLORIDE 20 MG/ML
INJECTION INTRAVENOUS
Status: DISCONTINUED | OUTPATIENT
Start: 2023-05-11 | End: 2023-05-11

## 2023-05-11 RX ORDER — PROCHLORPERAZINE EDISYLATE 5 MG/ML
5 INJECTION INTRAMUSCULAR; INTRAVENOUS EVERY 6 HOURS PRN
Status: DISCONTINUED | OUTPATIENT
Start: 2023-05-11 | End: 2023-05-11 | Stop reason: HOSPADM

## 2023-05-11 RX ORDER — METOCLOPRAMIDE HYDROCHLORIDE 5 MG/ML
10 INJECTION INTRAMUSCULAR; INTRAVENOUS
Status: COMPLETED | OUTPATIENT
Start: 2023-05-11 | End: 2023-05-11

## 2023-05-11 RX ORDER — DIPHENHYDRAMINE HYDROCHLORIDE 50 MG/ML
25 INJECTION INTRAMUSCULAR; INTRAVENOUS EVERY 4 HOURS PRN
Status: DISCONTINUED | OUTPATIENT
Start: 2023-05-11 | End: 2023-05-11 | Stop reason: HOSPADM

## 2023-05-11 RX ORDER — DIPHENHYDRAMINE HYDROCHLORIDE 50 MG/ML
INJECTION INTRAMUSCULAR; INTRAVENOUS
Status: DISCONTINUED | OUTPATIENT
Start: 2023-05-11 | End: 2023-05-11

## 2023-05-11 RX ORDER — SILVER NITRATE 38.21; 12.74 MG/1; MG/1
STICK TOPICAL
Status: DISCONTINUED | OUTPATIENT
Start: 2023-05-11 | End: 2023-05-11 | Stop reason: HOSPADM

## 2023-05-11 RX ADMIN — LIDOCAINE HYDROCHLORIDE 70 MG: 20 INJECTION INTRAVENOUS at 08:05

## 2023-05-11 RX ADMIN — METOCLOPRAMIDE 10 MG: 5 INJECTION, SOLUTION INTRAMUSCULAR; INTRAVENOUS at 06:05

## 2023-05-11 RX ADMIN — FAMOTIDINE 20 MG: 20 TABLET, FILM COATED ORAL at 06:05

## 2023-05-11 RX ADMIN — DEXAMETHASONE SODIUM PHOSPHATE 8 MG: 4 INJECTION, SOLUTION INTRAMUSCULAR; INTRAVENOUS at 08:05

## 2023-05-11 RX ADMIN — FENTANYL CITRATE 25 MCG: 50 INJECTION, SOLUTION INTRAMUSCULAR; INTRAVENOUS at 08:05

## 2023-05-11 RX ADMIN — KETOROLAC TROMETHAMINE 30 MG: 30 INJECTION, SOLUTION INTRAMUSCULAR; INTRAVENOUS at 09:05

## 2023-05-11 RX ADMIN — FENTANYL CITRATE 50 MCG: 50 INJECTION, SOLUTION INTRAMUSCULAR; INTRAVENOUS at 08:05

## 2023-05-11 RX ADMIN — ACETAMINOPHEN 1000 MG: 500 TABLET ORAL at 06:05

## 2023-05-11 RX ADMIN — DIPHENHYDRAMINE HYDROCHLORIDE 12.5 MG: 50 INJECTION, SOLUTION INTRAMUSCULAR; INTRAVENOUS at 08:05

## 2023-05-11 RX ADMIN — GABAPENTIN 600 MG: 300 CAPSULE ORAL at 06:05

## 2023-05-11 RX ADMIN — SODIUM CHLORIDE: 9 INJECTION, SOLUTION INTRAVENOUS at 08:05

## 2023-05-11 RX ADMIN — IBUPROFEN 600 MG: 200 TABLET, FILM COATED ORAL at 09:05

## 2023-05-11 RX ADMIN — FENTANYL CITRATE 25 MCG: 50 INJECTION, SOLUTION INTRAMUSCULAR; INTRAVENOUS at 09:05

## 2023-05-11 RX ADMIN — MIDAZOLAM HYDROCHLORIDE 2 MG: 1 INJECTION INTRAMUSCULAR; INTRAVENOUS at 08:05

## 2023-05-11 RX ADMIN — ONDANSETRON 4 MG: 2 INJECTION, SOLUTION INTRAMUSCULAR; INTRAVENOUS at 09:05

## 2023-05-11 RX ADMIN — GLYCOPYRROLATE 0.2 MG: 0.2 INJECTION INTRAMUSCULAR; INTRAVENOUS at 08:05

## 2023-05-11 RX ADMIN — PROPOFOL 150 MG: 10 INJECTION, EMULSION INTRAVENOUS at 08:05

## 2023-05-11 RX ADMIN — DEXAMETHASONE SODIUM PHOSPHATE 4 MG: 4 INJECTION, SOLUTION INTRAMUSCULAR; INTRAVENOUS at 09:05

## 2023-05-11 RX ADMIN — DIAZEPAM 5 MG: 5 TABLET ORAL at 07:05

## 2023-05-11 RX ADMIN — HYDROMORPHONE HYDROCHLORIDE 1 MG: 1 INJECTION, SOLUTION INTRAMUSCULAR; INTRAVENOUS; SUBCUTANEOUS at 09:05

## 2023-05-11 NOTE — TELEPHONE ENCOUNTER
----- Message from Karoline Hunt MD sent at 5/11/2023  9:18 AM CDT -----  Regarding: Postop visit  Can you please schedule this patient for 5/25 for postop visit? Please call and notify patient.

## 2023-05-11 NOTE — H&P
[facility] ([facility])  History & Physical  Gynecology    SUBJECTIVE:     Chief Complaint/Reason for Admission: Surgery    History of Present Illness:  Patient is a 57 y.o.  who presents for complaint of vaginal bleeding after starting systemic hormone therapy for hot flashes 2022 with Dr. Hudson. Menarche occurred at age 13 and the patient went into menopause at 52 years of age, which was 5 years ago. She had been on estradiol vaginal cream for 6 months prior to start of additional hormones with no problems per Urogyn. First episode of bleeding was within first three months of use with mostly spotting and some heavier bleeding like a light cycle day. The patient is sexually active. Pelvic ultrasound done by NP with Urogyn and EMS was 2.3 mm as was being evaluated/treated for mixed incontinence at that time. An EMB was attempted but could not be done due to cervical stenosis. She was sent to me and recommended a biopsy as had had more than one episode of bleeding. Patient declined trial in office due to pain at first attempt. Surgery could not be done initially due to cardiac concerns with work up done and cleared for this procedure. She is seeing hormone specialist through [facility] currently on Progesterone and vaginal estrogen.     OB History          1    Para   1    Term   1            AB        Living   1         SAB        IAB        Ectopic        Multiple        Live Births   1               GYN History  Age of Menarche:13  Age at first pregnancy:30   Age at first live birth:30  Number of months breastfeedin   Age at Menopause:52   Comments: History of abnormal pap 30 years ago with cryotherapy, normal since that time  No other STDs      No current facility-administered medications on file prior to encounter.     Current Outpatient Medications on File Prior to Encounter   Medication Sig Dispense Refill    butalbital-acetaminophen-caff -40 mg Cap        EScitalopram oxalate (LEXAPRO) 20 MG tablet Take 20 mg by mouth once daily.      estradioL (ESTRACE) 0.01 % (0.1 mg/gram) vaginal cream PLACE 1GM VAGINALLY 2-3 TIMES WEEKLY 42.5 g 2    fexofenadine HCl (ALLEGRA ORAL) Take by mouth.      fluticasone propionate (FLONASE) 50 mcg/actuation nasal spray       gabapentin (NEURONTIN) 300 MG capsule Take 1 capsule by mouth 2 (two) times daily.      hydroCHLOROthiazide (HYDRODIURIL) 25 MG tablet       HYDROcodone-acetaminophen (NORCO) 7.5-325 mg per tablet Take 1 tablet by mouth 2 (two) times daily as needed.      progesterone (PROMETRIUM) 100 MG capsule Take 1 capsule by mouth 30-60 minutes before bed every night 90 capsule 0    PROTONIX 40 mg tablet Take 40 mg by mouth every morning.      sucralfate (CARAFATE) 1 gram tablet Take 1 g by mouth 3 (three) times daily.      tiZANidine (ZANAFLEX) 4 MG tablet Take 4 mg by mouth 2 (two) times daily.      tolterodine (DETROL LA) 4 MG 24 hr capsule Take 1 capsule (4 mg total) by mouth once daily. 30 capsule 11    XIIDRA 5 % Dpet            Review of patient's allergies indicates:  No Known Allergies    Past Medical History:   Diagnosis Date    Abnormal Pap smear of cervix     Cryo Done ([facility])     Anxiety     Esophageal reflux     History of shingles ,     Right arm   and  Left hip     Hypertension     Menopause 2018    Migraine     Urge incontinence      Past Surgical History:   Procedure Laterality Date    APPENDECTOMY       SECTION      COLONOSCOPY  [id number]    Polyp in the transverse. Repeat in 5 years    COSMETIC SURGERY      Nose    KNEE SURGERY       Family History   Problem Relation Age of Onset    Diabetes Maternal Grandmother     Diabetes Maternal Grandfather     Hypertension Father     Hyperlipidemia Father     Coronary artery disease Father 60    Heart disease Father     Depression Mother     Kidney disease Sister     Breast cancer Maternal Aunt          at age 40    Colon  cancer Neg Hx     Ovarian cancer Neg Hx     Cancer Neg Hx     Stroke Neg Hx      Social History     Tobacco Use    Smoking status: Never    Smokeless tobacco: Never   Substance Use Topics    Alcohol use: Yes     Alcohol/week: 6.0 standard drinks     Types: 6 Glasses of wine per week     Comment: Something more    Drug use: No       Review of Systems:  Constitutional: no fever or chills  Respiratory: no cough or shortness of breath  Cardiovascular: no chest pain or palpitations  Genitourinary: no hematuria or dysuria     OBJECTIVE:     Wt Readings from Last 3 Encounters:   04/26/23 76.2 kg (167 lb 15.9 oz)   04/21/23 76.7 kg (169 lb)   04/11/23 76.5 kg (168 lb 10.4 oz)     Temp Readings from Last 3 Encounters:   04/11/23 97.4 °F (36.3 °C) (Oral)   12/12/22 98.6 °F (37 °C) (Oral)   06/28/22 98.6 °F (37 °C) (Oral)     BP Readings from Last 3 Encounters:   04/26/23 132/83   04/11/23 (!) 145/87   04/11/23 (!) 140/80     Pulse Readings from Last 3 Encounters:   04/26/23 74   04/11/23 85   02/28/23 67           Physical Exam:  APPEARANCE: Well nourished, well developed, in no acute distress.  PSYCH: Appropriate mood and affect.  SKIN: No acne or hirsutism.  NECK: Neck symmetric without masses or thyromegaly.  NODES: No inguinal, axillary, or supraclavicular lymph node enlargement.  CHEST: Normal respiratory effort.    CARDIOVASCULAR:  Regular rate and rhythm.  LUNGS:  Clear to auscultation bilaterally.  ABDOMEN: Soft.  No tenderness or masses.    PELVIC: Normal external genitalia without lesions.  Normal hair distribution.  Adequate perineal body, normal urethral meatus.  Vagina moist without lesions or discharge.  Cervix pink, without lesions, discharge or tenderness. Cervical os stenotic on appearance.  No significant cystocele or rectocele.  Bimanual exam shows uterus to be normal size, regular, mobile and nontender.  Adnexa without masses or tenderness.    EXTREMITIES: No edema.  No tenderness to  palpation.      Laboratory:  Lab Results   Component Value Date    WBC 3.73 (L) 2022    HGB 13.6 2022    HCT 40.9 2022     (H) 2022     2022     Lab Results   Component Value Date    TSH 1.263 2023       Pathology:  Pap smear 22 negative/HPV Negative    Ultrasound:  Results for orders placed during the hospital encounter of 22    US Pelvis Comp with Transvag NON-OB (xpd    Narrative  EXAMINATION:  US PELVIS COMP WITH TRANSVAG NON-OB (XPD)    CLINICAL HISTORY:  Postmenopausal bleeding    TECHNIQUE:  Transabdominal sonography of the pelvis was performed, followed by transvaginal sonography to better evaluate the uterus and ovaries.    COMPARISON:  2016    FINDINGS:  Uterus:    Size: 7.3 x 3.7 x 4.0 cm    Masses: None    Endometrium: Normal in this post menopausal patient, measuring 2.3 mm.    There is a trace amount of mixed echogenicity material present within the endocervical canal which may represent blood products.  A small polyp would be difficult to exclude.    Right ovary:    Size: 2.0 x 1.4 x 1.6 cm    Appearance: Normal    Vascular flow: Normal.    Left ovary:    Not visualized    Free Fluid:    None.    Impression  1. Trace mixed echogenicity material noted within the endocervical canal which may represent blood products.  A small polyp would be difficult to exclude.  2. Otherwise normal sonographic appearance of the uterus and right ovary  3. Nonvisualization of the left ovary.      Electronically signed by: Mike Barlow MD  Date:    2022  Time:    15:20          ASSESSMENT/PLAN:     56 yo  with history of postmenopausal bleeding     To OR for Hysteroscopy Dilation and Curettage  Risks and benefits explained in detail to patient, including but not limited to damage to internal organs, including but not limited to bowel, bladder, uterus, tubes, ovaries, nerves, arteries, veins, possible need for blood transfusion. Patient is  aware of all risks and desires surgery. All questions answered. Consents signed.  Cleared by cardiology for surgery.      Karoline Hunt MD

## 2023-05-11 NOTE — ANESTHESIA POSTPROCEDURE EVALUATION
Anesthesia Post Evaluation    Patient: Sharri Hill    Procedure(s) Performed: Procedure(s) (LRB):  HYSTEROSCOPY, WITH DILATION AND CURETTAGE OF UTERUS (N/A)    Final Anesthesia Type: general      Patient location during evaluation: PACU  Patient participation: Yes- Able to Participate  Level of consciousness: awake and alert, awake and oriented  Post-procedure vital signs: reviewed and stable  Pain management: adequate  Airway patency: patent    PONV status at discharge: No PONV  Anesthetic complications: no      Cardiovascular status: blood pressure returned to baseline  Respiratory status: unassisted, spontaneous ventilation and room air  Hydration status: euvolemic  Follow-up not needed.          Vitals Value Taken Time   /59 05/11/23 1015   Temp 36.9 °C (98.4 °F) 05/11/23 1015   Pulse 56 05/11/23 1015   Resp 16 05/11/23 1015   SpO2 100 % 05/11/23 1015         Event Time   Out of Recovery 09:30:00         Pain/Doreen Score: Pain Rating Prior to Med Admin: 9 (5/11/2023 10:15 AM)  Pain Rating Post Med Admin: 0 (5/11/2023 10:15 AM)  Doreen Score: 10 (5/11/2023 10:15 AM)

## 2023-05-11 NOTE — INTERVAL H&P NOTE
The patient has been examined and the H&P has been reviewed:    I concur with the findings and no changes have occurred since H&P was written.    Anesthesia/Surgery risks, benefits and alternative options discussed and understood by patient/family.    No new complaints. No bleeding since last visit.      There are no hospital problems to display for this patient.

## 2023-05-11 NOTE — OP NOTE
[facility] ([facility])  OBGYN  Operative Note    SUMMARY     Date of Procedure: 5/11/2023     Procedure: Procedure(s) (LRB):  HYSTEROSCOPY, WITH DILATION AND CURETTAGE OF UTERUS (N/A)       Surgeon: Karoline Hunt MD    Assisting Surgeon: None    Pre-Operative Diagnosis: Postmenopausal bleeding [N95.0]    Post-Operative Diagnosis: Post-Op Diagnosis Codes:     * Postmenopausal bleeding [N95.0]    Anesthesia: General    Technical Procedures Used: Hysteroscopic Polypectomy    Findings:  Exam under anesthesia: Vulva and vagina without lesions, discharge or bleeding. Cervix with os closed/stenotic and no lesions or bleeding. Uterus mobile, non-tender measuring 7 weeks size. Adnexa without masses or fullness.  Operative: No endocervical lesions. Uterus atrophic with one small 1-2 mm fibroid-like appearing lesion in right cornua blocking tubal ostia on that side that detached on its own. Right ostia seen and Left ostia seen both normal in appearance.    Description of the Findings of the Procedure: Patient was taken to the operating room where general anesthesia was performed and found to be adequate. She was prepped and draped in the normal sterile fashion in the dorsal lithotomy position in [facility]. Bladder was drained with straight catheter. Weighted speculum placed in the posterior vagina and the anterior vagina was elevated with olesya retractor. The anterior lip of the cervix was grasped with the single tooth tenaculum. The cervical os was stenotic and dilators unable to be placed. An 11-blade scalpel was used to make a cruciate incision to external os. The uterus was then sounded and measured 6 cm in length without difficulty. The dilators were used to dilate the cervix enough to allow introduction of the hysteroscope (6 mm). The hysteroscope was introduced and above findings were noted. The small lesion noted in right cornua detached on its own and into the suction to be sent for  pathology. The uterine cavity was noted to be free of polyps or other anomalies. Bilateral tubal ostia were visualized. The hysteroscope was removed and endometrial curette used to obtain sample from all four quadrants of the uterus and sent for pathology. All instruments were then removed from the vagina. The patient tolerated procedure well. Sponge lap and needle counts were correct x 2.    Complications: No    Estimated Blood Loss (EBL): 10 cc  IVF: 500 cc  UOP: 20 cc  Fluid deficit: 140 cc           Specimens:   Specimen (24h ago, onward)       Start     Ordered    05/11/23 0904  Specimen to Pathology, Surgery Gynecology and Obstetrics  Once        Comments: Pre-op Diagnosis: Postmenopausal bleeding [N95.0]Procedure(s):HYSTEROSCOPY, WITH DILATION AND CURETTAGE OF UTERUS Number of specimens: 1Name of specimens: 1-Endometrial Biopsy     References:    Click here for ordering Quick Tip   Question Answer Comment   Procedure Type: Gynecology and Obstetrics    Which provider would you like to cc? SUMIT VIDES    Release to patient Immediate        05/11/23 0904                            Condition: Good    Disposition: PACU - hemodynamically stable.    Attestation: There was no qualified resident available for this procedure.        Discharge Note    SUMMARY     Admit Date: 5/11/2023    Discharge Date and Time:  05/11/2023 11 AM    Hospital Course (synopsis of major diagnoses, care, treatment, and services provided during the course of the hospital stay): Patient was admitted for surgery. Following surgery she was transferred to the floor and underwent routine postoperative care. She tolerated po, ambulated without difficulty, and pain was well controlled. She remained afebrile throughout hospital course and her labs were stable. She was discharged to home in stable condition.      Final Diagnosis: Post-Op Diagnosis Codes:     * Postmenopausal bleeding [N95.0]    Disposition: Home or Self Care    Follow  Up/Patient Instructions: 2 weeks with Dr. Hunt    Medications:  Reconciled Home Medications:      Medication List        START taking these medications      naproxen 500 MG tablet  Commonly known as: NAPROSYN  Take 1 tablet (500 mg total) by mouth 2 (two) times daily. for 30 doses            CONTINUE taking these medications      ALLEGRA ORAL  Take by mouth.     butalbital-acetaminophen-caff -40 mg Cap     EScitalopram oxalate 20 MG tablet  Commonly known as: LEXAPRO  Take 20 mg by mouth once daily.     estradioL 0.01 % (0.1 mg/gram) vaginal cream  Commonly known as: ESTRACE  PLACE 1GM VAGINALLY 2-3 TIMES WEEKLY     fluticasone propionate 50 mcg/actuation nasal spray  Commonly known as: FLONASE     gabapentin 300 MG capsule  Commonly known as: NEURONTIN  Take 1 capsule by mouth 2 (two) times daily.     hydroCHLOROthiazide 25 MG tablet  Commonly known as: HYDRODIURIL     HYDROcodone-acetaminophen 7.5-325 mg per tablet  Commonly known as: NORCO  Take 1 tablet by mouth 2 (two) times daily as needed.     losartan 50 MG tablet  Commonly known as: COZAAR  Take 1 tablet (50 mg total) by mouth once daily.     magnesium oxide 400 mg (241.3 mg magnesium) tablet  Commonly known as: MAG-OX  Take 1 twice a day for 1 month, then 1 daily; if diarrhea occurs, decrease dose     nabumetone 750 MG tablet  Commonly known as: RELAFEN  TAKE ONE TABLET BY MOUTH TWICE A DAY     progesterone 100 MG capsule  Commonly known as: PROMETRIUM  Take 1 capsule by mouth 30-60 minutes before bed every night     PROTONIX 40 MG tablet  Generic drug: pantoprazole  Take 40 mg by mouth every morning.     sucralfate 1 gram tablet  Commonly known as: CARAFATE  Take 1 g by mouth 3 (three) times daily.     tiZANidine 4 MG tablet  Commonly known as: ZANAFLEX  Take 4 mg by mouth 2 (two) times daily.     tolterodine 4 MG 24 hr capsule  Commonly known as: DETROL LA  Take 1 capsule (4 mg total) by mouth once daily.     XIIDRA 5 % Dpet  Generic drug:  lifitegrast     zolpidem 10 mg Tab  Commonly known as: AMBIEN  TAKE ONE TABLET BY MOUTH AT BEDTIME AS NEEDED            Discharge Procedure Orders   Diet general     Call MD for:  temperature >100.4     Call MD for:  persistent nausea and vomiting     Call MD for:  severe uncontrolled pain     Call MD for:  redness, tenderness, or signs of infection (pain, swelling, redness, odor or green/yellow discharge around incision site)     Pelvic Rest   Order Comments: For 1 week postop     Call MD for:   Order Comments: Vaginal bleeding that fills a pad more than once in an hour in two consecutive hours or foul-smelling vaginal discharge     No dressing needed     Activity as tolerated      Follow-up Information       Karoline Hunt MD Follow up in 2 week(s).    Specialty: Obstetrics and Gynecology  Contact information:  [address] [facility]  [address]  [facility] [address] [phone]

## 2023-05-11 NOTE — PLAN OF CARE
Pt and daughter given discharge instructions at the bedside. All questions answered at the bedside.  Pt verbalizes understanding. Pt VSS. Pt tolerated 1 cup of sprite; please refer to flow sheet. Pt IV access removed; bleeding controlled. Pt dressed per self; pt given mesh panties and a judith pad. Pt wheelchair transport provided. Will continue to monitor.

## 2023-05-11 NOTE — ANESTHESIA PROCEDURE NOTES
Intubation    Date/Time: 5/11/2023 8:42 AM  Performed by: Ervin Johnson CRNA  Authorized by: Javed Vickers MD     Intubation:     Induction:  Intravenous    Intubated:  Postinduction    Mask Ventilation:  Easy mask    Attempts:  1    Attempted By:  CRNA    Difficult Airway Encountered?: No      Complications:  None    Airway Device:  Supraglottic airway/LMA    Airway Device Size:  4.5    Style/Cuff Inflation:  Cuffed (inflated to minimal occlusive pressure)    Inflation Amount (mL):  13    Secured at:  The lips    Placement Verified By:  Capnometry    Complicating Factors:  None    Findings Post-Intubation:  BS equal bilateral and atraumatic/condition of teeth unchanged

## 2023-05-11 NOTE — TRANSFER OF CARE
Anesthesia Transfer of Care Note    Patient: Sharri Hill    Procedure(s) Performed: Procedure(s) (LRB):  HYSTEROSCOPY, WITH DILATION AND CURETTAGE OF UTERUS (N/A)    Patient location: PACU    Anesthesia Type: general    Transport from OR: Transported from OR on 6-10 L/min O2 by face mask with adequate spontaneous ventilation    Post pain: adequate analgesia    Post assessment: no apparent anesthetic complications    Post vital signs: stable    Level of consciousness: awake, alert and oriented    Nausea/Vomiting: no nausea/vomiting    Complications: none    Transfer of care protocol was followed      Last vitals:   Visit Vitals  BP (!) 111/59   Pulse 60   Temp 36.8 °C (98.2 °F) (Temporal)   Resp 16   LMP 10/17/2018   SpO2 98%   Breastfeeding No

## 2023-05-11 NOTE — PLAN OF CARE
Pt arrived to recovery 17 via stretcher per OR team. Bedside report received. Pt attached to bedside monitor. VSS. Pt drowsy post procedure. Pt on 8L of oxygen via simple face mask; oxygen sats 100%. Pt has judith pad in place. Pt IV access saline locked and flushes without difficulty.Warm blanket applied. Will continue to monitor.

## 2023-05-12 ENCOUNTER — TELEPHONE (OUTPATIENT)
Dept: OBSTETRICS AND GYNECOLOGY | Facility: CLINIC | Age: 58
End: 2023-05-12
Payer: COMMERCIAL

## 2023-05-12 NOTE — TELEPHONE ENCOUNTER
Phone call made to patient to follow up after surgery. Operative findings reviewed with patient. She is doing well.  Ambulating, voiding, tolerating diet, passing flatus.  Pain well controlled without medications.  Advised to call with any concerns.  All questions answered. Postop visit scheduled.

## 2023-05-17 DIAGNOSIS — G47.09 OTHER INSOMNIA: ICD-10-CM

## 2023-05-17 RX ORDER — ZOLPIDEM TARTRATE 10 MG/1
TABLET ORAL
Qty: 30 TABLET | Refills: 0 | Status: SHIPPED | OUTPATIENT
Start: 2023-05-17 | End: 2023-06-13

## 2023-05-17 NOTE — TELEPHONE ENCOUNTER
No care due was identified.  [facility] Embedded Care Due Messages. Reference number: [id number].   5/17/2023 9:52:47 AM CDT

## 2023-05-19 LAB
FINAL PATHOLOGIC DIAGNOSIS: NORMAL
GROSS: NORMAL
Lab: NORMAL

## 2023-05-24 DIAGNOSIS — M17.12 PRIMARY OSTEOARTHRITIS OF LEFT KNEE: ICD-10-CM

## 2023-05-24 NOTE — TELEPHONE ENCOUNTER
No care due was identified.  [facility] Embedded Care Due Messages. Reference number: [id number].   5/24/2023 1:51:04 PM CDT

## 2023-05-25 ENCOUNTER — OFFICE VISIT (OUTPATIENT)
Dept: OBSTETRICS AND GYNECOLOGY | Facility: CLINIC | Age: 58
End: 2023-05-25
Payer: COMMERCIAL

## 2023-05-25 VITALS
SYSTOLIC BLOOD PRESSURE: 114 MMHG | WEIGHT: 168.88 LBS | DIASTOLIC BLOOD PRESSURE: 72 MMHG | BODY MASS INDEX: 25.68 KG/M2

## 2023-05-25 DIAGNOSIS — Z11.51 ENCOUNTER FOR SCREENING FOR HUMAN PAPILLOMAVIRUS (HPV): ICD-10-CM

## 2023-05-25 DIAGNOSIS — N94.10 DYSPAREUNIA IN FEMALE: ICD-10-CM

## 2023-05-25 DIAGNOSIS — N39.46 MIXED INCONTINENCE URGE AND STRESS: ICD-10-CM

## 2023-05-25 DIAGNOSIS — N95.1 MENOPAUSAL STATE: ICD-10-CM

## 2023-05-25 DIAGNOSIS — N95.0 POSTMENOPAUSAL BLEEDING: Primary | ICD-10-CM

## 2023-05-25 DIAGNOSIS — N87.0 DYSPLASIA OF CERVIX, LOW GRADE (CIN 1): ICD-10-CM

## 2023-05-25 PROCEDURE — 99024 PR POST-OP FOLLOW-UP VISIT: ICD-10-PCS | Mod: S$GLB,,, | Performed by: OBSTETRICS & GYNECOLOGY

## 2023-05-25 PROCEDURE — 88141 CYTOPATH C/V INTERPRET: CPT | Mod: ,,, | Performed by: PATHOLOGY

## 2023-05-25 PROCEDURE — 88141 PR  CYTOPATH CERV/VAG INTERPRET: ICD-10-PCS | Mod: ,,, | Performed by: PATHOLOGY

## 2023-05-25 PROCEDURE — 99999 PR PBB SHADOW E&M-EST. PATIENT-LVL III: ICD-10-PCS | Mod: PBBFAC,,, | Performed by: OBSTETRICS & GYNECOLOGY

## 2023-05-25 PROCEDURE — 87624 HPV HI-RISK TYP POOLED RSLT: CPT | Performed by: OBSTETRICS & GYNECOLOGY

## 2023-05-25 PROCEDURE — 88175 CYTOPATH C/V AUTO FLUID REDO: CPT | Performed by: PATHOLOGY

## 2023-05-25 PROCEDURE — 99999 PR PBB SHADOW E&M-EST. PATIENT-LVL III: CPT | Mod: PBBFAC,,, | Performed by: OBSTETRICS & GYNECOLOGY

## 2023-05-25 PROCEDURE — 99024 POSTOP FOLLOW-UP VISIT: CPT | Mod: S$GLB,,, | Performed by: OBSTETRICS & GYNECOLOGY

## 2023-05-25 RX ORDER — PROMETHAZINE HYDROCHLORIDE 25 MG/1
TABLET ORAL
Qty: 30 TABLET | Refills: 3 | Status: SHIPPED | OUTPATIENT
Start: 2023-05-25 | End: 2023-08-02

## 2023-05-25 RX ORDER — NABUMETONE 750 MG/1
TABLET, FILM COATED ORAL
Qty: 60 TABLET | Refills: 0 | Status: SHIPPED | OUTPATIENT
Start: 2023-05-25 | End: 2023-09-15

## 2023-05-25 NOTE — PROGRESS NOTES
Subjective:       Sharri Hill is a 57 y.o. [id number] presents to the clinic 2 weeks status post operative hysteroscopy for  postmenopausal bleeding . Eating a regular diet without difficulty. Bowel movements are normal. The patient is not having any pain.    The patient's allergies, and past medical and surgical histories were reviewed.    Review of Systems  Pertinent items are noted in HPI.      Objective:      /72 (BP Location: Right arm, Patient Position: Sitting, BP Method: Medium (Manual))   Wt 76.6 kg (168 lb 14 oz)   LMP 10/17/2018 Comment:   2018  BMI 25.68 kg/m²   General:  alert, appears stated age, and cooperative   Abdomen: soft, bowel sounds active, non-tender   Pelvic:       Vulva and vagina without lesions, discharge or bleeding. Cervix with no lesions. Uterus small, mobile and non-tender. Adnexa without fullness or tenderness        Pathology:  ENDOMETRIUM, CURETTAGE:   - No endometrial tissue present for evaluation.   - Squamous ectocervical mucosa with features suggestive of low- grade squamous intraepithelial lesion/cervical intraepithelial neoplasia 1 (ESTELLA 1).       Assessment:      Doing well postoperatively.  Operative findings again reviewed. Pathology report discussed. Counseled pap smear last year negative/HPV negative so ESTELLA 1 may have been an overread or due to inflammatory changes/atrophy associated with menopause.     Plan:      1. Continue any current medications.  2. Wound care discussed.  3. Activity restrictions: none  4. Anticipated return to work: not applicable.  5. Follow up:for annual exam in 1 year    6. ESTELLA 1 on path specimen: Pap smear with HPV today.      Karoline Hunt MD

## 2023-05-30 RX ORDER — ESTRADIOL 0.1 MG/G
CREAM VAGINAL
Qty: 42.5 G | Refills: 2 | Status: SHIPPED | OUTPATIENT
Start: 2023-05-30 | End: 2023-06-01 | Stop reason: SDUPTHER

## 2023-06-01 ENCOUNTER — OFFICE VISIT (OUTPATIENT)
Dept: OBSTETRICS AND GYNECOLOGY | Facility: CLINIC | Age: 58
End: 2023-06-01
Attending: OBSTETRICS & GYNECOLOGY
Payer: COMMERCIAL

## 2023-06-01 VITALS
SYSTOLIC BLOOD PRESSURE: 124 MMHG | HEART RATE: 75 BPM | WEIGHT: 171 LBS | DIASTOLIC BLOOD PRESSURE: 79 MMHG | BODY MASS INDEX: 25.91 KG/M2 | HEIGHT: 68 IN

## 2023-06-01 DIAGNOSIS — N95.1 SYMPTOMATIC MENOPAUSAL OR FEMALE CLIMACTERIC STATES: ICD-10-CM

## 2023-06-01 DIAGNOSIS — N39.46 MIXED INCONTINENCE URGE AND STRESS: ICD-10-CM

## 2023-06-01 DIAGNOSIS — N95.1 MENOPAUSAL STATE: ICD-10-CM

## 2023-06-01 DIAGNOSIS — N94.10 DYSPAREUNIA IN FEMALE: ICD-10-CM

## 2023-06-01 LAB
FINAL PATHOLOGIC DIAGNOSIS: NORMAL
Lab: NORMAL

## 2023-06-01 PROCEDURE — 99214 PR OFFICE/OUTPT VISIT, EST, LEVL IV, 30-39 MIN: ICD-10-PCS | Mod: S$GLB,,, | Performed by: OBSTETRICS & GYNECOLOGY

## 2023-06-01 PROCEDURE — 99214 OFFICE O/P EST MOD 30 MIN: CPT | Mod: S$GLB,,, | Performed by: OBSTETRICS & GYNECOLOGY

## 2023-06-01 PROCEDURE — 99999 PR PBB SHADOW E&M-EST. PATIENT-LVL IV: ICD-10-PCS | Mod: PBBFAC,,, | Performed by: OBSTETRICS & GYNECOLOGY

## 2023-06-01 PROCEDURE — 99999 PR PBB SHADOW E&M-EST. PATIENT-LVL IV: CPT | Mod: PBBFAC,,, | Performed by: OBSTETRICS & GYNECOLOGY

## 2023-06-01 RX ORDER — ESTRADIOL 0.1 MG/G
1 CREAM VAGINAL
Qty: 42.5 G | Refills: 5 | Status: SHIPPED | OUTPATIENT
Start: 2023-06-02 | End: 2023-09-07 | Stop reason: SDUPTHER

## 2023-06-01 RX ORDER — ESTRADIOL 0.25 MG/.25G
1 GEL TOPICAL DAILY
Qty: 30 PACKET | Refills: 12 | Status: SHIPPED | OUTPATIENT
Start: 2023-06-01 | End: 2023-09-07 | Stop reason: SDUPTHER

## 2023-06-01 RX ORDER — PROGESTERONE 100 MG/1
CAPSULE ORAL
Qty: 90 CAPSULE | Refills: 3 | Status: SHIPPED | OUTPATIENT
Start: 2023-06-01 | End: 2023-09-07 | Stop reason: SDUPTHER

## 2023-06-01 NOTE — PROGRESS NOTES
Sharri Hill is a 57 y.o. female who is here for follow-up of hormone replacement therapy.  At her last visit on 2-, she said Menarche occurred at age 13 and the patient went into menopause at 52 years of age, which was 5 years ago. She had been on estradiol vaginal cream for 6 months prior to start of additional hormones with no problems per Urogyn. Patient began HRT with Dr. Hudson in July 2022 for management of hot flashes, moodiness, vaginal dryness, anxiety, decreased libido (mainly due to dyspareunia), insomnia, hair loss, palpitations, and joint pain. She was placed on Estrace 1mg and prometrium 100mg.   First episode of bleeding was within first three months of use with mostly spotting and some heavier bleeding like a light cycle day . She states she stopped the Estradiol within a few weeks after beginning the bleeding. An attempt at EMB was made by Urogyn twice with pretreatment with CYtotec, but unable to be completed due to cervical stenosis. Ultrasound showed a thin Endometrial stripe, with a possible small endocervical polyp. Primary concern is Pain with sex, which has improved with use of the Lidocain,2% Diazepam 5, Baclo 4%suppositories given by Urogyn to treat this.      An additional concern is her recent elevated Blood Pressure which seems to have begun a few months ago, states it had never been noted prior to beginning her HRT, and she is seeing Cardiology for this as well as for the palpitations she has been having, although the palpitations began prior to starting HRT.    Since that time, she has had a Hysteroscopy with D&C by Dr. Yañez which showed:   ENDOMETRIUM, CURETTAGE:   - No endometrial tissue present for evaluation.   - Squamous ectocervical mucosa with features suggestive of low- grade squamous intraepithelial lesion/cervical intraepithelial neoplasia 1 (ESTELLA 1).   Repeat Pap May 25: Normal and Negative for HR HPV    She has also undergone evaluation with Cardiology for the  HTN and Palpitations which has been negative.     PLAN on 2-:  Continue Progesterone 200mg nightly  Continue Vaginal estradiol      Follow up in 3 months  Will recheck to determine if should begin ERT again, and if so, would recommend use of Transdermal Estradiol , either the patch or Divigel    She is currently taking Vaginal estradiol cream and Prometrium 200mg po HS.   Her main concern today is She Hasn't felt well since November. Still gets palpitations and still has discomfort with sex. She feels tired, sluggish and has weight gain. She also has thinning head hair. (Has started Nutrafol ). .  She also mentioned that she has 2-3 loose stools every morning, and has been on medication for peristalsis because she was found to have small ulcerations in her stomach/esophagus. We discussed that she should follow up with her GI physician to discuss this. .  Patient denies post-menopausal vaginal bleeding. The patient is sexually active.     Office Visit on 05/25/2023   Component Date Value Ref Range Status    Final Pathologic Diagnosis 05/25/2023    Final                    Value:Specimen Adequacy  Satisfactory for interpretation. Endocervical component is present.    [place] Category  Negative for intraepithelial lesion or malignancy     Final Diagnostic Interpretation  Negative for intraepithelial lesion or malignancy. Reactive cellular changes.  Inflammation present.      Disclaimer 05/25/2023    Final                    Value:The Pap smear is a screening test that aids in the detection of cervical cancer and cancer precursors. Both false positive and false negative results can occur. The test should be used at regular intervals, and positive results should be confirmed before   definitive therapy.  This liquid based specimen is processed using the  or  Thin PrepPAP System. This specimen has been analyzed by the [employer] System ([employer] - team[employer] across global offices), an automated imaging and review system  which assists the laboratory in evaluating   cells on ThinPrep PAP tests. Following automated imaging, selected fields from every slide are reviewed by a cytotechnologist and/or pathologist.     Screening was performed at [facility], [address], [place] [address].      HPV other High Risk types, PCR 05/25/2023 Negative  Negative Final    HPV High Risk type 16, PCR 05/25/2023 Negative  Negative Final    HPV High Risk type 18, PCR 05/25/2023 Negative  Negative Final   Admission on 05/11/2023, Discharged on 05/11/2023   Component Date Value Ref Range Status    Final Pathologic Diagnosis 05/11/2023    Final                    Value:ENDOMETRIUM, CURETTAGE:  - No endometrial tissue present for evaluation.  - Squamous ectocervical mucosa with features suggestive of low- grade squamous intraepithelial lesion/cervical intraepithelial neoplasia 1 (ESTELLA 1).      Gross 05/11/2023    Final                    Value:Patient ID/MRN:  [id number]  Pathology label MRN:  [id number]    The specimen is received in formalin labeled &quot;endometrial biopsy&quot;.  The specimen consists of multiple red-brown fragments of soft tissue admixed with gelatinous mucoid material measuring 2.0 x 2.0 x 0.7 cm in aggregate.  The specimen is   submitted entirely in cassette VEH--1-LEVI Dudley MS  Grossing Technologist      Disclaimer 05/11/2023 Unless the case is a 'gross only' or additional testing only, the final diagnosis for each specimen is based on a microscopic examination of appropriate tissue sections.   Final   Hospital Outpatient Visit on 04/21/2023   Component Date Value Ref Range Status    BSA 04/21/2023 1.92  m2 Final    TDI SEPTAL 04/21/2023 0.11  m/s Final    LV LATERAL E/E' RATIO 04/21/2023 8.27  m/s Final    LV SEPTAL E/E' RATIO 04/21/2023 8.27  m/s Final    LA WIDTH 04/21/2023 4.00  cm Final    Left Ventricular Outflow Tract Zaira* 04/21/2023 0.67  cm/s Final    Left  Ventricular Outflow Tract Zaira* 04/21/2023 2.42  mmHg Final    Pulmonary Valve Mean Velocity 04/21/2023 0.72  m/s Final    AORTIC VALVE CUSP SEPERATION 04/21/2023 1.92  cm Final    TDI LATERAL 04/21/2023 0.11  m/s Final    PV PEAK VELOCITY 04/21/2023 1.04  cm/s Final    LVIDd 04/21/2023 4.48  3.5 - 6.0 cm Final    IVS 04/21/2023 0.92  0.6 - 1.1 cm Final    Posterior Wall 04/21/2023 0.87  0.6 - 1.1 cm Final    Ao root annulus 04/21/2023 2.73  cm Final    LVIDs 04/21/2023 2.71  2.1 - 4.0 cm Final    FS 04/21/2023 40  28 - 44 % Final    LA volume 04/21/2023 59.32  cm3 Final    LV mass 04/21/2023 130.87  g Final    LA size 04/21/2023 3.34  cm Final    RVDD 04/21/2023 2.48  cm Final    Left Ventricle Relative Wall Thick* 04/21/2023 0.39  cm Final    AV mean gradient 04/21/2023 5  mmHg Final    AV valve area 04/21/2023 2.64  cm2 Final    AV Velocity Ratio 04/21/2023 0.86   Final    AV index (prosthetic) 04/21/2023 0.83   Final    MV mean gradient 04/21/2023 2  mmHg Final    MV valve area p 1/2 method 04/21/2023 3.57  cm2 Final    MV valve area by continuity eq 04/21/2023 2.77  cm2 Final    E/A ratio 04/21/2023 1.00   Final    Mean e' 04/21/2023 0.11  m/s Final    E wave deceleration time 04/21/2023 212.47  msec Final    IVRT 04/21/2023 85.63  msec Final    LVOT diameter 04/21/2023 2.02  cm Final    LVOT area 04/21/2023 3.2  cm2 Final    LVOT peak adrián 04/21/2023 1.32  m/s Final    LVOT peak VTI 04/21/2023 26.90  cm Final    Ao peak adrián 04/21/2023 1.53  m/s Final    Ao VTI 04/21/2023 32.6  cm Final    Mr max adrián 04/21/2023 4.76  m/s Final    LVOT stroke volume 04/21/2023 86.16  cm3 Final    AV peak gradient 04/21/2023 9  mmHg Final    MV peak gradient 04/21/2023 3  mmHg Final    E/E' ratio 04/21/2023 8.27  m/s Final    MV Peak E Adrián 04/21/2023 0.91  m/s Final    TR Max Adrián 04/21/2023 2.48  m/s Final    MV VTI 04/21/2023 31.1  cm Final    MV stenosis pressure 1/2 time 04/21/2023 61.62  ms Final    MV Peak A Adrián 04/21/2023  0.91  m/s Final    LV Systolic Volume 04/21/2023 27.30  mL Final    LV Systolic Volume Index 04/21/2023 14.4  mL/m2 Final    LV Diastolic Volume 04/21/2023 91.57  mL Final    LV Diastolic Volume Index 04/21/2023 48.19  mL/m2 Final    LA Volume Index 04/21/2023 31.2  mL/m2 Final    LV Mass Index 04/21/2023 69  g/m2 Final    RA Major Axis 04/21/2023 5.11  cm Final    Left Atrium Minor Axis 04/21/2023 5.30  cm Final    Left Atrium Major Axis 04/21/2023 5.15  cm Final    Triscuspid Valve Regurgitation Pea* 04/21/2023 25  mmHg Final    LA Volume Index (Mod) 04/21/2023 25.3  mL/m2 Final    LA volume (mod) 04/21/2023 48.07  cm3 Final    Momin's Biplane MOD Ejection Fra* 04/21/2023 7  % Final    Right Atrial Pressure (from IVC) 04/21/2023 3  mmHg Final    EF 04/21/2023 60  % Final    Sinus 04/21/2023 3.60  cm Final    TV rest pulmonary artery pressure 04/21/2023 28  mmHg Final   Hospital Outpatient Visit on 04/18/2023   Component Date Value Ref Range Status    Left RAR 04/18/2023 1.28   Final    Right RAR 04/18/2023 1.39   Final    Left Renal Highest PSV 04/18/2023 137.00   Final    Left Renal Highest EDV 04/18/2023 45.00   Final    Right Renal Highest PSV 04/18/2023 149.00   Final    Right Renal Highest EDV 04/18/2023 49.00   Final    Right renal origin sys 04/18/2023 115  cm/s Final    Right renal prox sys 04/18/2023 149  cm/s Final    Right renal mid sys 04/18/2023 139  cm/s Final    Right renal dist sys 04/18/2023 53  cm/s Final    Right renal origin martin 04/18/2023 39  cm/s Final    Right renal prox martin 04/18/2023 49  cm/s Final    Right renal mid martin 04/18/2023 47  cm/s Final    Right renal dist martin 04/18/2023 20  cm/s Final    Right renal prox rar 04/18/2023 1.39   Final    Left renal origin sys 04/18/2023 110  cm/s Final    Left renal prox sys 04/18/2023 108  cm/s Final    Left renal mid sys 04/18/2023 137  cm/s Final    Left renal dist sys 04/18/2023 68  cm/s Final    Left renal origin martin 04/18/2023 34  cm/s  Final    Left renal prox martin 04/18/2023 37  cm/s Final    Left renal mid martin 04/18/2023 45  cm/s Final    Left renal dist martin 04/18/2023 24  cm/s Final    Left renal prox rar 04/18/2023 1.01   Final    Right Renal Ultrasound Resistive I* 04/18/2023 0.6   Final    Right Renal Ultrasound Resistive I* 04/18/2023 0.65   Final    Right Renal Ultrasound Resistive I* 04/18/2023 0.63   Final    Right Renal Ultrasound Resistive I* 04/18/2023 0.65   Final    Left Renal Ultrasound Resistive In* 04/18/2023 0.63   Final    Left Renal Ultrasound Resistive In* 04/18/2023 0.68   Final    Left Renal Ultrasound Resistive In* 04/18/2023 0.53   Final    Left Renal Ultrasound Resistive In* 04/18/2023 0.68   Final    Right Renal Ultrasound Kidney Size* 04/18/2023 10.5  cm Final    Right Renal Ultrasound Kidney Size 04/18/2023 10.50  cm Final    Left Renal Ultrasound Kidney Size * 04/18/2023 10.8  cm Final    Left Renal Ultrasound Kidney Size 04/18/2023 10.80  cm Final    Right Renal Ultrasound Acceleratio* 04/18/2023 29  ms Final    Right Renal Ultrasound Acceleratio* 04/18/2023 37  ms Final    Right Renal Ultrasound Acceleratio* 04/18/2023 37  ms Final    Right Renal Ultrasound Acceleratio* 04/18/2023 37.00  ms Final    Left Renal Ultrasound Acceleration* 04/18/2023 23  ms Final    Left Renal Ultrasound Acceleration* 04/18/2023 37  ms Final    Left Renal Ultrasound Acceleration* 04/18/2023 34  ms Final    Left Renal Ultrasound Acceleration* 04/18/2023 37.00  ms Final    Prox abd aorta PSV 04/18/2023 107  cm/s Final    Prox abd aorta EDV 04/18/2023 20  cm/s Final   Lab Visit on 04/18/2023   Component Date Value Ref Range Status    Urine Total Volume 04/18/2023 1650  mL Final    Urine Collection Duration 04/18/2023 24  h Final    Norepinephrine, 24H Ur 04/18/2023 54  15 - 80 mcg/24 h Final    Epinephrine, 24H Ur 04/18/2023 2.1  <21 mcg/24 h Final    Dopamine , 24H Ur 04/18/2023 338  65 - 400 mcg/24 h Final    Urine Total Volume  2023 1650  mL Corrected    Urine Collection Duration 2023 24  h Final    Metanephrines, 24H Ur 2023 61  mcg/24 h Final    Normetanephrine, 24H Ur 2023 469  mcg/24 h Final    Total Metanephrine, urine 2023 530  mcg/24 h Final    Comment, (Metanephrines) 2023 Test Not Performed   Final    Hours Collected 2023 24  hr Corrected    Urine Total Volume 2023 1650  mL Final    Creatinine, Urine - per volume 2023 72  mg/dL Final    Creatinine, urine - per 24 hours 2023 1188  500 - 1400 mg/d Final    VMA, Urine - per volume 2023 2.7  mg/L Final    VMA, Urine 2023 4  0 - 6 mg/gCR Final    VMA, 24H Ur 2023 4.5  0.0 - 7.0 mg/d Final    VMA Interp. 2023 See Note   Final   Lab Visit on 2023   Component Date Value Ref Range Status    Sodium 2023 137  136 - 145 mmol/L Final    Potassium 2023 4.7  3.5 - 5.1 mmol/L Final    Chloride 2023 102  95 - 110 mmol/L Final    CO2 2023 24  23 - 29 mmol/L Final    Glucose 2023 90  70 - 110 mg/dL Final    BUN 2023 15  6 - 20 mg/dL Final    Creatinine 2023 0.9  0.5 - 1.4 mg/dL Final    Calcium 2023 9.6  8.7 - 10.5 mg/dL Final    Anion Gap 2023 11  8 - 16 mmol/L Final    eGFR 2023 >60.0  >60 mL/min/1.73 m^2 Final    Magnesium 2023 1.9  1.6 - 2.6 mg/dL Final    TSH 2023 1.263  0.400 - 4.000 uIU/mL Final    Aldosterone 2023 12.6  ng/dL Final    Renin 2023 8.8  ng/mL/hr Final    Aldosterone/Renin Activity Calcula* 2023 1.4  <=25.0 ratio Final       Past Medical History:   Diagnosis Date    Abnormal Pap smear of cervix     Cryo Done ([place])     Anxiety     Esophageal reflux     History of shingles ,     Right arm   and  Left hip     Hypertension     Menopause 2018    Migraine     Urge incontinence      Past Surgical History:   Procedure Laterality Date    APPENDECTOMY       SECTION       COLONOSCOPY  [id number]    Polyp in the transverse. Repeat in 5 years    COSMETIC SURGERY      Nose    HYSTEROSCOPY WITH DILATION AND CURETTAGE OF UTERUS N/A 2023    Procedure: HYSTEROSCOPY, WITH DILATION AND CURETTAGE OF UTERUS;  Surgeon: Karoline Hunt MD;  Location: [facility] OR;  Service: OB/GYN;  Laterality: N/A;    KNEE SURGERY       Social History     Tobacco Use    Smoking status: Never    Smokeless tobacco: Never   Substance Use Topics    Alcohol use: Yes     Alcohol/week: 6.0 standard drinks     Types: 6 Glasses of wine per week     Comment: Something more    Drug use: No     Family History   Problem Relation Age of Onset    Diabetes Maternal Grandmother     Diabetes Maternal Grandfather     Hypertension Father     Hyperlipidemia Father     Coronary artery disease Father 60    Heart disease Father     Depression Mother     Kidney disease Sister     Breast cancer Maternal Aunt          at age 40    Colon cancer Neg Hx     Ovarian cancer Neg Hx     Cancer Neg Hx     Stroke Neg Hx      OB History    Para Term  AB Living   1 1 1     1   SAB IAB Ectopic Multiple Live Births           1      # Outcome Date GA Lbr Dylan/2nd Weight Sex Delivery Anes PTL Lv   1 Term 95 40w0d  2.495 kg (5 lb 8 oz) F CS-Unspec   EDYTA       Current Outpatient Medications:     butalbital-acetaminophen-caff -40 mg Cap, , Disp: , Rfl:     EScitalopram oxalate (LEXAPRO) 20 MG tablet, Take 20 mg by mouth once daily., Disp: , Rfl:     estradioL (DIVIGEL) 0.25 mg/0.25 gram (0.1 %) GlPk, Place 1 packet onto the skin Daily., Disp: 30 packet, Rfl: 12    [START ON 2023] estradioL (ESTRACE) 0.01 % (0.1 mg/gram) vaginal cream, Place 1 g vaginally 3 (three) times a week., Disp: 42.5 g, Rfl: 5    fexofenadine HCl (ALLEGRA ORAL), Take by mouth., Disp: , Rfl:     fluticasone propionate (FLONASE) 50 mcg/actuation nasal spray, , Disp: , Rfl:     gabapentin (NEURONTIN) 300 MG capsule, Take 1 capsule by mouth 2  "(two) times daily., Disp: , Rfl:     hydroCHLOROthiazide (HYDRODIURIL) 25 MG tablet, , Disp: , Rfl:     HYDROcodone-acetaminophen (NORCO) 7.5-325 mg per tablet, Take 1 tablet by mouth 2 (two) times daily as needed., Disp: , Rfl:     losartan (COZAAR) 50 MG tablet, Take 1 tablet (50 mg total) by mouth once daily., Disp: 90 tablet, Rfl: 4    magnesium oxide (MAG-OX) 400 mg (241.3 mg magnesium) tablet, Take 1 twice a day for 1 month, then 1 daily; if diarrhea occurs, decrease dose, Disp: , Rfl: 0    nabumetone (RELAFEN) 750 MG tablet, TAKE ONE TABLET BY MOUTH TWICE A DAY, Disp: 60 tablet, Rfl: 0    progesterone (PROMETRIUM) 100 MG capsule, Take 1 capsule by mouth 30-60 minutes before bed every night, Disp: 90 capsule, Rfl: 3    promethazine (PHENERGAN) 25 MG tablet, TAKE ONE TABLET BY MOUTH EVERY 4 HOURS, Disp: 30 tablet, Rfl: 3    PROTONIX 40 mg tablet, Take 40 mg by mouth every morning., Disp: , Rfl:     sucralfate (CARAFATE) 1 gram tablet, Take 1 g by mouth 3 (three) times daily., Disp: , Rfl:     tiZANidine (ZANAFLEX) 4 MG tablet, Take 4 mg by mouth 2 (two) times daily., Disp: , Rfl:     tolterodine (DETROL LA) 4 MG 24 hr capsule, Take 1 capsule (4 mg total) by mouth once daily., Disp: 30 capsule, Rfl: 11    XIIDRA 5 % Dpet, , Disp: , Rfl:     zolpidem (AMBIEN) 10 mg Tab, TAKE 1 TABLET BY MOUTH AT BEDTIME AS NEEDED, Disp: 30 tablet, Rfl: 0    Vitals:    06/01/23 1040   BP: 124/79   Pulse: 75   Weight: 77.6 kg (171 lb)   Height: 5' 8" (1.727 m)   PainSc: 0-No pain     Body mass index is 26 kg/m².       Assessment:    Menopausal state  -     estradioL (DIVIGEL) 0.25 mg/0.25 gram (0.1 %) GlPk; Place 1 packet onto the skin Daily.  Dispense: 30 packet; Refill: 12    Mixed incontinence urge and stress  -     estradioL (ESTRACE) 0.01 % (0.1 mg/gram) vaginal cream; Place 1 g vaginally 3 (three) times a week.  Dispense: 42.5 g; Refill: 5    Dyspareunia in female  -     estradioL (ESTRACE) 0.01 % (0.1 mg/gram) vaginal cream; " Place 1 g vaginally 3 (three) times a week.  Dispense: 42.5 g; Refill: 5    Symptomatic menopausal or female climacteric states  -     progesterone (PROMETRIUM) 100 MG capsule; Take 1 capsule by mouth 30-60 minutes before bed every night  Dispense: 90 capsule; Refill: 3        Plan:   Risks and benefits of hormone replacement therapy were discussed.  Hormone replacement therapy options, including bioidentical versus non-bioidentical hormones, as well as alternatives discussed.    Continue:  Prometrium 100mg nightly     Vaginal estradiol cream 1 gram three times weekly    Start:  Divigel 0.25 daily        Follow up in 4 months.  Instructed patient to call if she experiences any side effects or has any questions.  Face to face time with this patient and her spouse was 35 minutes of which more than 50% was spent in counseling.

## 2023-06-13 DIAGNOSIS — G47.09 OTHER INSOMNIA: ICD-10-CM

## 2023-06-13 RX ORDER — ZOLPIDEM TARTRATE 10 MG/1
TABLET ORAL
Qty: 30 TABLET | Refills: 0 | Status: SHIPPED | OUTPATIENT
Start: 2023-06-13 | End: 2023-07-10

## 2023-06-13 NOTE — TELEPHONE ENCOUNTER
No care due was identified.  [facility] Embedded Care Due Messages. Reference number: [id number].   6/13/2023 8:50:50 AM CDT

## 2023-06-15 ENCOUNTER — PATIENT MESSAGE (OUTPATIENT)
Dept: CARDIOLOGY | Facility: CLINIC | Age: 58
End: 2023-06-15
Payer: COMMERCIAL

## 2023-06-15 DIAGNOSIS — R00.2 PALPITATIONS: Primary | ICD-10-CM

## 2023-06-15 DIAGNOSIS — I10 BENIGN ESSENTIAL HYPERTENSION: ICD-10-CM

## 2023-06-15 RX ORDER — METOPROLOL SUCCINATE 25 MG/1
25 TABLET, EXTENDED RELEASE ORAL NIGHTLY
Qty: 30 TABLET | Refills: 11 | Status: SHIPPED | OUTPATIENT
Start: 2023-06-15 | End: 2023-08-31 | Stop reason: SDUPTHER

## 2023-06-23 ENCOUNTER — PATIENT MESSAGE (OUTPATIENT)
Dept: PRIMARY CARE CLINIC | Facility: CLINIC | Age: 58
End: 2023-06-23
Payer: COMMERCIAL

## 2023-06-23 RX ORDER — ESCITALOPRAM OXALATE 20 MG/1
20 TABLET ORAL DAILY
Qty: 30 TABLET | Refills: 5 | Status: SHIPPED | OUTPATIENT
Start: 2023-06-23 | End: 2023-08-31 | Stop reason: SDUPTHER

## 2023-07-10 DIAGNOSIS — G47.09 OTHER INSOMNIA: ICD-10-CM

## 2023-07-10 RX ORDER — ZOLPIDEM TARTRATE 10 MG/1
TABLET ORAL
Qty: 30 TABLET | Refills: 0 | Status: SHIPPED | OUTPATIENT
Start: 2023-07-10 | End: 2023-08-14 | Stop reason: SDUPTHER

## 2023-07-10 NOTE — TELEPHONE ENCOUNTER
No care due was identified.  [facility] Embedded Care Due Messages. Reference number: [id number].   7/10/2023 1:07:34 PM CDT

## 2023-07-13 ENCOUNTER — OFFICE VISIT (OUTPATIENT)
Dept: PRIMARY CARE CLINIC | Facility: CLINIC | Age: 58
End: 2023-07-13
Payer: COMMERCIAL

## 2023-07-13 DIAGNOSIS — G47.00 INSOMNIA, UNSPECIFIED TYPE: ICD-10-CM

## 2023-07-13 DIAGNOSIS — R19.5 LOOSE STOOLS: Primary | ICD-10-CM

## 2023-07-13 PROCEDURE — 99214 OFFICE O/P EST MOD 30 MIN: CPT | Mod: 95,,, | Performed by: INTERNAL MEDICINE

## 2023-07-13 PROCEDURE — 99214 PR OFFICE/OUTPT VISIT, EST, LEVL IV, 30-39 MIN: ICD-10-PCS | Mod: 95,,, | Performed by: INTERNAL MEDICINE

## 2023-07-13 NOTE — PROGRESS NOTES
The patient location is: home  The chief complaint leading to consultation is: medication management     Visit type: audiovisual    Face to Face time with patient: 20 min  25 minutes of total time spent on the encounter, which includes face to face time and non-face to face time preparing to see the patient (eg, review of tests), Obtaining and/or reviewing separately obtained history, Documenting clinical information in the electronic or other health record, Independently interpreting results (not separately reported) and communicating results to the patient/family/caregiver, or Care coordination (not separately reported).         Each patient to whom he or she provides medical services by telemedicine is:  (1) informed of the relationship between the physician and patient and the respective role of any other health care provider with respect to management of the patient; and (2) notified that he or she may decline to receive medical services by telemedicine and may withdraw from such care at any time.    Notes:  [facility] Note    Chief Complaint      Chief Complaint   Patient presents with    Medication Management       History of Present Illness      Sharri Hill is a 58 y.o. female who presents today for   Chief Complaint   Patient presents with    Medication Management   .  Patient comes to appointment here for virtual visit for ambien refill as well a s for her recent hitory of chronic diarrhea . Has been opresent for the last 3 m is seeng Dr Richard Marie with gi and w/u is in progress . She is stable from her palpitations as she has bens tarted on metoprolol. But she is on xr which is less likely to cause diarrhea    Problem List Items Addressed This Visit          GI    Loose stools - Primary    Overview     workupin progress with gi cont immodium prn             Other    Insomnia    Overview      reviewed cont ambien prn               Past Medical History:  Past Medical  History:   Diagnosis Date    Abnormal Pap smear of cervix     Cryo Done ([facility])     Anxiety     Esophageal reflux     History of shingles ,     Right arm   and  Left hip     Hypertension     Menopause 2018    Migraine     Urge incontinence        Past Surgical History:  Past Surgical History:   Procedure Laterality Date    APPENDECTOMY       SECTION      COLONOSCOPY  [id number]    Polyp in the transverse. Repeat in 5 years    COSMETIC SURGERY      Nose    HYSTEROSCOPY WITH DILATION AND CURETTAGE OF UTERUS N/A 2023    Procedure: HYSTEROSCOPY, WITH DILATION AND CURETTAGE OF UTERUS;  Surgeon: Karoline Hunt MD;  Location: [facility];  Service: OB/GYN;  Laterality: N/A;    KNEE SURGERY         Family History:  family history includes Breast cancer in her maternal aunt; Coronary artery disease (age of onset: 60) in her father; Depression in her mother; Diabetes in her maternal grandfather and maternal grandmother; Heart disease in her father; Hyperlipidemia in her father; Hypertension in her father; Kidney disease in her sister.    Social History:  Social History     Socioeconomic History    Marital status:    Tobacco Use    Smoking status: Never    Smokeless tobacco: Never   Substance and Sexual Activity    Alcohol use: Yes     Alcohol/week: 6.0 standard drinks     Types: 6 Glasses of wine per week     Comment: Something more    Drug use: No    Sexual activity: Yes     Partners: Male     Birth control/protection: Partner-Vasectomy, Post-menopausal     Comment:  , together since 40 years       Review of Systems:   Review of Systems   Constitutional:  Positive for malaise/fatigue.   Eyes:  Negative for blurred vision.   Respiratory:  Negative for shortness of breath.    Cardiovascular:  Negative for chest pain, palpitations, orthopnea and PND.   Musculoskeletal:  Positive for neck pain.   Neurological:  Positive for headaches.       Medications:  Outpatient  Encounter Medications as of 7/13/2023   Medication Sig Note Dispense Refill    butalbital-acetaminophen-caff -40 mg Cap  5/10/2023: Hold DOS      EScitalopram oxalate (LEXAPRO) 20 MG tablet Take 1 tablet (20 mg total) by mouth once daily.  30 tablet 5    estradioL (DIVIGEL) 0.25 mg/0.25 gram (0.1 %) GlPk Place 1 packet onto the skin Daily.  30 packet 12    estradioL (ESTRACE) 0.01 % (0.1 mg/gram) vaginal cream Place 1 g vaginally 3 (three) times a week.  42.5 g 5    fexofenadine HCl (ALLEGRA ORAL) Take by mouth. 5/10/2023: Take DOS      fluticasone propionate (FLONASE) 50 mcg/actuation nasal spray  7/20/2020: Take as needed      gabapentin (NEURONTIN) 300 MG capsule Take 1 capsule by mouth 2 (two) times daily. 5/10/2023: Take DOS      hydroCHLOROthiazide (HYDRODIURIL) 25 MG tablet  5/10/2023: Hold DOS      HYDROcodone-acetaminophen (NORCO) 7.5-325 mg per tablet Take 1 tablet by mouth 2 (two) times daily as needed. 5/10/2023: May take DOS      losartan (COZAAR) 50 MG tablet Take 1 tablet (50 mg total) by mouth once daily. 5/10/2023: Hold DOS 90 tablet 4    metoprolol succinate (TOPROL-XL) 25 MG 24 hr tablet Take 1 tablet (25 mg total) by mouth every evening.  30 tablet 11    nabumetone (RELAFEN) 750 MG tablet TAKE ONE TABLET BY MOUTH TWICE A DAY  60 tablet 0    progesterone (PROMETRIUM) 100 MG capsule Take 1 capsule by mouth 30-60 minutes before bed every night  90 capsule 3    promethazine (PHENERGAN) 25 MG tablet TAKE ONE TABLET BY MOUTH EVERY 4 HOURS  30 tablet 3    PROTONIX 40 mg tablet Take 40 mg by mouth every morning. 5/10/2023: Take DOS      sucralfate (CARAFATE) 1 gram tablet Take 1 g by mouth 3 (three) times daily. 5/10/2023: Take DOS      tiZANidine (ZANAFLEX) 4 MG tablet Take 4 mg by mouth 2 (two) times daily. 5/10/2023: Take DOS      tolterodine (DETROL LA) 4 MG 24 hr capsule Take 1 capsule (4 mg total) by mouth once daily. 5/10/2023: Hold DOS 30 capsule 11    zolpidem (AMBIEN) 10 mg Tab TAKE ONE  TABLET BY MOUTH AT BEDTIME AS NEEDED  30 tablet 0    [DISCONTINUED] magnesium oxide (MAG-OX) 400 mg (241.3 mg magnesium) tablet Take 1 twice a day for 1 month, then 1 daily; if diarrhea occurs, decrease dose 5/10/2023: Hold DOS  0    [DISCONTINUED] XIIDRA 5 % Dpet        [DISCONTINUED] zolpidem (AMBIEN) 10 mg Tab TAKE ONE TABLET BY MOUTH AT BEDTIME AS NEEDED  30 tablet 0     No facility-administered encounter medications on file as of 7/13/2023.        Allergies:  Review of patient's allergies indicates:  No Known Allergies      Physical Exam       There were no vitals filed for this visit.      Physical Exam  Constitutional:       General: She is not in acute distress.     Appearance: Normal appearance. She is not ill-appearing.   Eyes:      General:         Right eye: No discharge.         Left eye: No discharge.      Pupils: Pupils are equal, round, and reactive to light.   Pulmonary:      Effort: Pulmonary effort is normal.   Neurological:      General: No focal deficit present.      Mental Status: She is alert and oriented to person, place, and time.   Psychiatric:         Mood and Affect: Mood normal.         Behavior: Behavior normal.         Thought Content: Thought content normal.         Judgment: Judgment normal.        Laboratory:  CBC:  No results for input(s): WBC, RBC, HGB, HCT, PLT, MCV, MCH, MCHC in the last 2160 hours.  CMP:  No results for input(s): GLU, CALCIUM, ALBUMIN, PROT, NA, K, CO2, CL, BUN, ALKPHOS, ALT, AST, BILITOT in the last 2160 hours.    Invalid input(s): CREATININ  URINALYSIS:  No results for input(s): COLORU, CLARITYU, SPECGRAV, PHUR, PROTEINUA, GLUCOSEU, BILIRUBINCON, BLOODU, WBCU, RBCU, BACTERIA, MUCUS, NITRITE, LEUKOCYTESUR, UROBILINOGEN, HYALINECASTS in the last 2160 hours.   LIPIDS:  No results for input(s): TSH, HDL, CHOL, TRIG, LDLCALC, CHOLHDL, NONHDLCHOL, TOTALCHOLEST in the last 2160 hours.  TSH:  No results for input(s): TSH in the last 2160 hours.  A1C:  No results for  input(s): HGBA1C in the last 2160 hours.    Radiology:        Assessment:     Sharri Hill is a 58 y.o.female with:    Loose stools    Insomnia, unspecified type          Plan:     Problem List Items Addressed This Visit          GI    Loose stools - Primary    Overview     workupin progress with gi cont immodium prn             Other    Insomnia    Overview      reviewed cont ambien prn             As above, continue current medications and maintain follow up with specialists.  Return to clinic in 3 months.      David Bose  [facility]              Answers submitted by the patient for this visit:  High Blood Pressure Questionnaire (Submitted on 7/13/2023)  Chief Complaint: Hypertension  Chronicity: chronic  Onset: more than 1 month ago  Progression since onset: gradually worsening  Condition status: resistant  anxiety: Yes  peripheral edema: No  sweats: Yes  Agents associated with hypertension: estrogens  CAD risks: family history, stress  Compliance problems: no compliance problems

## 2023-08-02 ENCOUNTER — OFFICE VISIT (OUTPATIENT)
Dept: CARDIOLOGY | Facility: CLINIC | Age: 58
End: 2023-08-02
Payer: COMMERCIAL

## 2023-08-02 VITALS
HEIGHT: 68 IN | BODY MASS INDEX: 26.1 KG/M2 | WEIGHT: 172.19 LBS | SYSTOLIC BLOOD PRESSURE: 105 MMHG | DIASTOLIC BLOOD PRESSURE: 70 MMHG | HEART RATE: 67 BPM

## 2023-08-02 DIAGNOSIS — R00.2 PALPITATIONS: ICD-10-CM

## 2023-08-02 DIAGNOSIS — R19.7 DIARRHEA, UNSPECIFIED TYPE: ICD-10-CM

## 2023-08-02 DIAGNOSIS — I10 BENIGN ESSENTIAL HYPERTENSION: Primary | ICD-10-CM

## 2023-08-02 PROCEDURE — 99214 OFFICE O/P EST MOD 30 MIN: CPT | Mod: S$GLB,,, | Performed by: INTERNAL MEDICINE

## 2023-08-02 PROCEDURE — 99214 PR OFFICE/OUTPT VISIT, EST, LEVL IV, 30-39 MIN: ICD-10-PCS | Mod: S$GLB,,, | Performed by: INTERNAL MEDICINE

## 2023-08-02 PROCEDURE — 99214 OFFICE O/P EST MOD 30 MIN: CPT | Mod: PBBFAC | Performed by: INTERNAL MEDICINE

## 2023-08-02 PROCEDURE — 99999 PR PBB SHADOW E&M-EST. PATIENT-LVL IV: ICD-10-PCS | Mod: PBBFAC,,, | Performed by: INTERNAL MEDICINE

## 2023-08-02 PROCEDURE — 99999 PR PBB SHADOW E&M-EST. PATIENT-LVL IV: CPT | Mod: PBBFAC,,, | Performed by: INTERNAL MEDICINE

## 2023-08-02 NOTE — PROGRESS NOTES
Subjective:   08/02/2023     Patient ID:  Sharri Hill is a 58 y.o. female who presents for evaulation of Palpitations, Hyperlipidemia, and Hypertension      Comes in for follow-up.  Her blood pressures and palpitations appear to be improved on combination therapy with hydrochlorothiazide, losartan and now metoprolol.  She had developed diarrhea for several weeks and thought it may have been related to those meds, but it seems to have improved without discontinuation.  She is not had symptomatic hypotension.  Palpitations are better.    Echocardiography in 4/23 was normal.      Cardiac risk low.      No exertional chest pains or tightness, PND orthopnea.  Has been stressed,  just had neck surgery.        Previous note:    Here for preop clearance and evaluation for hypertension.  She had a workup for secondary causes of hypertension which was negative.  Her blood pressures at home have been running proximally 140/72.  Still slightly elevated on losartan 25 and HCTZ 25 mg daily.  She had previously had problem with hyperkalemia on losartan alone.  Her last potassium was 4.2 on that combination.      She does not have chest pains tightness PND orthopnea.      She is scheduled for gyn surgery    The 10-year ASCVD risk score (Delroy JUNG, et al., 2019) is: 3.1%    Values used to calculate the score:      Age: 57 years      Sex: Female      Is Non- : No      Diabetic: No      Tobacco smoker: No      Systolic Blood Pressure: 132 mmHg      Is BP treated: Yes      HDL Cholesterol: 78 mg/dL      Total Cholesterol: 214 mg/dL        Prior note:    Comes in for cardiac evaluation, she had new onset hypertension several months ago, she is felt poorly ever since then.  She is able to exercise without chest pains or tightness, PND or orthopnea.  A Holter monitor showed rare PACs and PVCs.  Renal function has been normal.  The hypertension did appear to be a fairly abrupt onset.  It may have been  associated with a steroid shot.      She does not smoke cigarettes.  She drinks alcohol occasionally.  She has not been diagnosed with sleep apnea, but she does snore.    She has felt better on the current combination losartan HCT.    Prior note:   Sharri Hill is a 57 y.o. female with past medical history of hypertension who showed up to clinic for routine follow-up. Has been uncontrolled over the last few months, currently she is on nifedipine 60 mg and losartan 25 mg daily. She has not feeling well and she is fatigued all the time. Blood pressure check at home SBP 130s to 150s, she reported bilateral ankle edema.    Current Medications:  Current Outpatient Medications on File Prior to Visit   Medication Sig Dispense Refill   · butalbital-acetaminophen-caff -40 mg Cap capsule   · escitalopram oxalate (LEXAPRO) 10 MG tablet Take 10 mg daily by mouth   · escitalopram oxalate (LEXAPRO) 20 MG tablet Take 20 mg daily by mouth   · estradioL (ESTRACE) 0.01 % (0.1 mg/gram) vaginal cream   · gabapentin (NEURONTIN) 300 MG capsule Take 300 mg daily by mouth   · HYDROcodone-acetaminophen (NORCO) 7.5-325 mg per tablet Take 1 tablet as needed by mouth   · losartan (COZAAR) 25 MG tablet Take 1 tablet daily by mouth 30 tablet 11   · miSOPROStoL (CYTOTEC) 200 MCG tablet Take 200 mcg by mouth   · nabumetone (RELAFEN) 750 MG tablet Take 750 mg 2 (two) times daily by mouth   · pantoprazole (PROTONIX) 40 MG tablet Take 40 mg daily by mouth   · progesterone (PROMETRIUM) 100 MG capsule Take 100 mg daily by mouth   · promethazine (PHENERGAN) 25 MG tablet Take 25 mg as needed by mouth   · sucralfate (CARAFATE) 1 gram tablet Take 1 g as needed by mouth   · tiZANidine (ZANAFLEX) 4 MG tablet Take 4 mg 2 (two) times a day by mouth   · tolterodine (DETROL LA) 4 MG 24 hr capsule Take 4 mg daily by mouth   · XIIDRA 5 % Dpet   · zolpidem (AMBIEN) 10 mg tablet Take 10 mg daily by mouth   · [DISCONTINUED] NIFEdipine (ADALAT CC) 60 MG 24 hr  tablet Take 1 tablet daily by mouth 30 tablet 5   · [DISCONTINUED] MYRBETRIQ 50 mg Tb24 Take 50 mg daily by mouth     No current facility-administered medications on file prior to visit.     PROBLEM LIST:     Encounter Diagnoses   Name Primary?   · Essential hypertension     Plan:     # uncontrolled hypertension. Will discontinue nifedipine and started on hydrochlorothiazide 25 mg daily in addition to losartan 25 mg daily. BMP ordered    Follow-up in 3 months      Past Medical History:   Diagnosis Date    Abnormal Pap smear of cervix 1992    Cryo Done ([facility])     Anxiety     Esophageal reflux     History of shingles 2012, 2021    Right arm 2012  and  Left hip 2021    Hypertension     Menopause 2018    Migraine     Urge incontinence        Review of patient's allergies indicates:  No Known Allergies      Current Outpatient Medications:     butalbital-acetaminophen-caff -40 mg Cap, , Disp: , Rfl:     EScitalopram oxalate (LEXAPRO) 20 MG tablet, Take 1 tablet (20 mg total) by mouth once daily., Disp: 30 tablet, Rfl: 5    estradioL (DIVIGEL) 0.25 mg/0.25 gram (0.1 %) GlPk, Place 1 packet onto the skin Daily., Disp: 30 packet, Rfl: 12    estradioL (ESTRACE) 0.01 % (0.1 mg/gram) vaginal cream, Place 1 g vaginally 3 (three) times a week., Disp: 42.5 g, Rfl: 5    gabapentin (NEURONTIN) 300 MG capsule, Take 1 capsule by mouth 2 (two) times daily., Disp: , Rfl:     hydroCHLOROthiazide (HYDRODIURIL) 25 MG tablet, , Disp: , Rfl:     HYDROcodone-acetaminophen (NORCO) 7.5-325 mg per tablet, Take 1 tablet by mouth 2 (two) times daily as needed., Disp: , Rfl:     losartan (COZAAR) 50 MG tablet, Take 1 tablet (50 mg total) by mouth once daily., Disp: 90 tablet, Rfl: 4    metoprolol succinate (TOPROL-XL) 25 MG 24 hr tablet, Take 1 tablet (25 mg total) by mouth every evening., Disp: 30 tablet, Rfl: 11    nabumetone (RELAFEN) 750 MG tablet, TAKE ONE TABLET BY MOUTH TWICE A DAY, Disp: 60 tablet, Rfl: 0    progesterone  (PROMETRIUM) 100 MG capsule, Take 1 capsule by mouth 30-60 minutes before bed every night, Disp: 90 capsule, Rfl: 3    PROTONIX 40 mg tablet, Take 40 mg by mouth every morning., Disp: , Rfl:     tiZANidine (ZANAFLEX) 4 MG tablet, Take 4 mg by mouth 2 (two) times daily., Disp: , Rfl:     tolterodine (DETROL LA) 4 MG 24 hr capsule, Take 1 capsule (4 mg total) by mouth once daily., Disp: 30 capsule, Rfl: 11    zolpidem (AMBIEN) 10 mg Tab, TAKE ONE TABLET BY MOUTH AT BEDTIME AS NEEDED, Disp: 30 tablet, Rfl: 0     Objective:   Review of Systems   Cardiovascular:  Negative for chest pain, claudication, cyanosis, dyspnea on exertion, irregular heartbeat, leg swelling, near-syncope, orthopnea, palpitations, paroxysmal nocturnal dyspnea and syncope.   Gastrointestinal:  Positive for diarrhea.         Vitals:    08/02/23 1414   BP: 105/70   Pulse: 67     Wt Readings from Last 3 Encounters:   08/02/23 78.1 kg (172 lb 2.9 oz)   06/01/23 77.6 kg (171 lb)   05/25/23 76.6 kg (168 lb 14 oz)     Temp Readings from Last 3 Encounters:   05/11/23 98.4 °F (36.9 °C) (Temporal)   04/11/23 97.4 °F (36.3 °C) (Oral)   12/12/22 98.6 °F (37 °C) (Oral)     BP Readings from Last 3 Encounters:   08/02/23 105/70   06/01/23 124/79   05/25/23 114/72     Pulse Readings from Last 3 Encounters:   08/02/23 67   06/01/23 75   05/11/23 (!) 56             Physical Exam  Vitals reviewed.   Constitutional:       General: She is not in acute distress.     Appearance: She is well-developed.   HENT:      Head: Normocephalic and atraumatic.      Nose: Nose normal.   Eyes:      Conjunctiva/sclera: Conjunctivae normal.      Pupils: Pupils are equal, round, and reactive to light.   Neck:      Vascular: No JVD.   Cardiovascular:      Rate and Rhythm: Normal rate and regular rhythm.      Pulses: Normal pulses and intact distal pulses.      Heart sounds: Normal heart sounds. No murmur heard.     No friction rub. No gallop.   Pulmonary:      Effort: Pulmonary effort is  normal. No respiratory distress.      Breath sounds: Normal breath sounds. No wheezing or rales.   Chest:      Chest wall: No tenderness.   Abdominal:      General: Bowel sounds are normal. There is no distension.      Palpations: Abdomen is soft.      Tenderness: There is no abdominal tenderness.   Musculoskeletal:         General: No tenderness or deformity. Normal range of motion.      Cervical back: Normal range of motion and neck supple.      Right lower leg: No edema.      Left lower leg: No edema.   Skin:     General: Skin is warm and dry.      Findings: No erythema or rash.   Neurological:      Mental Status: She is alert and oriented to person, place, and time.      Cranial Nerves: No cranial nerve deficit.      Motor: No abnormal muscle tone.      Coordination: Coordination normal.   Psychiatric:         Behavior: Behavior normal.         Thought Content: Thought content normal.         Judgment: Judgment normal.           Lab Results   Component Value Date    CHOL 214 (H) 12/22/2022    CHOL 256 (H) 03/30/2022    CHOL 239 (H) 02/09/2021     Lab Results   Component Value Date    HDL 78 (H) 12/22/2022    HDL 89 (H) 03/30/2022    HDL 90 (H) 02/09/2021     Lab Results   Component Value Date    LDLCALC 125 12/22/2022    LDLCALC 154.8 03/30/2022    LDLCALC 133.4 02/09/2021     Lab Results   Component Value Date    ALT 23 03/30/2022    AST 30 03/30/2022    AST 28 02/09/2021    AST 19 09/17/2019     Lab Results   Component Value Date    CREATININE 0.9 04/11/2023    BUN 15 04/11/2023     04/11/2023    K 4.7 04/11/2023    CO2 24 04/11/2023    CO2 24 03/30/2022    CO2 31 (H) 02/09/2021     Lab Results   Component Value Date    HGB 13.6 03/30/2022    HCT 40.9 03/30/2022    HCT 40.9 02/09/2021    HCT 40.3 09/17/2019                         Assessment and Plan:     Benign essential hypertension  Comments:  Appears well controlled    Palpitations  Comments:  Seems improved with metoprolol    Diarrhea, unspecified  type  Comments:  Doubt related to medications, she will let me know if she has recurrent episodes.           Follow up in about 6 months (around 2/2/2024).          Future Appointments   Date Time Provider Department Center   10/25/2023 11:00 AM January Gloria MD [facility] MARYSE [employer]hov[employer] witness [facility]

## 2023-08-14 DIAGNOSIS — G47.09 OTHER INSOMNIA: ICD-10-CM

## 2023-08-14 RX ORDER — ZOLPIDEM TARTRATE 10 MG/1
10 TABLET ORAL NIGHTLY PRN
Qty: 30 TABLET | Refills: 0 | Status: SHIPPED | OUTPATIENT
Start: 2023-08-14 | End: 2023-09-07

## 2023-08-14 NOTE — TELEPHONE ENCOUNTER
No care due was identified.  [facility] Embedded Care Due Messages. Reference number: [id number].   8/14/2023 1:26:43 PM CDT

## 2023-08-25 ENCOUNTER — PATIENT MESSAGE (OUTPATIENT)
Dept: CARDIOLOGY | Facility: CLINIC | Age: 58
End: 2023-08-25
Payer: COMMERCIAL

## 2023-08-31 DIAGNOSIS — N39.46 MIXED INCONTINENCE URGE AND STRESS: ICD-10-CM

## 2023-08-31 DIAGNOSIS — I10 BENIGN ESSENTIAL HYPERTENSION: ICD-10-CM

## 2023-08-31 DIAGNOSIS — R00.2 PALPITATIONS: ICD-10-CM

## 2023-08-31 RX ORDER — ESCITALOPRAM OXALATE 20 MG/1
TABLET ORAL
Refills: 0 | OUTPATIENT
Start: 2023-08-31

## 2023-08-31 RX ORDER — LOSARTAN POTASSIUM 50 MG/1
50 TABLET ORAL DAILY
Qty: 90 TABLET | Refills: 3 | Status: SHIPPED | OUTPATIENT
Start: 2023-08-31 | End: 2023-09-08 | Stop reason: SDUPTHER

## 2023-08-31 RX ORDER — TOLTERODINE 4 MG/1
4 CAPSULE, EXTENDED RELEASE ORAL DAILY
Qty: 90 CAPSULE | Refills: 3 | Status: SHIPPED | OUTPATIENT
Start: 2023-08-31

## 2023-08-31 RX ORDER — METOPROLOL SUCCINATE 25 MG/1
25 TABLET, EXTENDED RELEASE ORAL NIGHTLY
Qty: 90 TABLET | Refills: 3 | Status: SHIPPED | OUTPATIENT
Start: 2023-08-31 | End: 2023-09-08 | Stop reason: SDUPTHER

## 2023-08-31 NOTE — TELEPHONE ENCOUNTER
No care due was identified.  [facility] Embedded Care Due Messages. Reference number: [id number].   8/31/2023 10:32:13 AM CDT

## 2023-08-31 NOTE — TELEPHONE ENCOUNTER
Refill Decision Note   Sharri Hill  is requesting a refill authorization.  Brief Assessment and Rationale for Refill:  Quick Discontinue     Medication Therapy Plan: Pharmacy is requesting new scripts for the following medications without required information, (sig/ frequency/qty/etc)     Medication Reconciliation Completed: No   Comments:     No Care Gaps recommended.     Note composed:12:46 PM 08/31/2023

## 2023-08-31 NOTE — TELEPHONE ENCOUNTER
----- Message from Bailey Smith MA sent at 8/31/2023  4:32 PM CDT -----  Requesting an RX refill or new RX.    RX name and strength: EScitalopram oxalate (LEXAPRO) 20 MG tablet       Is this a refill or new RX: Refill    Is this a 30 day or 90 day RX: 90 day    Pharmacy name and phone:          [facility] - [address] [address]  Phone: [phone] Fax: [phone]

## 2023-09-01 RX ORDER — ESCITALOPRAM OXALATE 20 MG/1
20 TABLET ORAL DAILY
Qty: 90 TABLET | Refills: 0 | Status: SHIPPED | OUTPATIENT
Start: 2023-09-01 | End: 2023-11-08

## 2023-09-06 ENCOUNTER — PATIENT MESSAGE (OUTPATIENT)
Dept: OBSTETRICS AND GYNECOLOGY | Facility: CLINIC | Age: 58
End: 2023-09-06
Payer: COMMERCIAL

## 2023-09-07 DIAGNOSIS — N95.1 SYMPTOMATIC MENOPAUSAL OR FEMALE CLIMACTERIC STATES: ICD-10-CM

## 2023-09-07 DIAGNOSIS — N39.46 MIXED INCONTINENCE URGE AND STRESS: ICD-10-CM

## 2023-09-07 DIAGNOSIS — N95.1 MENOPAUSAL STATE: ICD-10-CM

## 2023-09-07 DIAGNOSIS — N94.10 DYSPAREUNIA IN FEMALE: ICD-10-CM

## 2023-09-07 DIAGNOSIS — G47.09 OTHER INSOMNIA: ICD-10-CM

## 2023-09-07 RX ORDER — ESTRADIOL 0.1 MG/G
1 CREAM VAGINAL
Qty: 42.5 G | Refills: 3 | Status: SHIPPED | OUTPATIENT
Start: 2023-09-08

## 2023-09-07 RX ORDER — ESTRADIOL 0.25 MG/.25G
1 GEL TOPICAL DAILY
Qty: 30 PACKET | Refills: 6 | Status: SHIPPED | OUTPATIENT
Start: 2023-09-07 | End: 2023-09-14 | Stop reason: SDUPTHER

## 2023-09-07 RX ORDER — PROGESTERONE 100 MG/1
CAPSULE ORAL
Qty: 90 CAPSULE | Refills: 1 | Status: SHIPPED | OUTPATIENT
Start: 2023-09-07 | End: 2024-02-12

## 2023-09-07 RX ORDER — ZOLPIDEM TARTRATE 10 MG/1
10 TABLET ORAL NIGHTLY PRN
Qty: 30 TABLET | Refills: 0 | Status: SHIPPED | OUTPATIENT
Start: 2023-09-07 | End: 2023-10-12

## 2023-09-07 NOTE — TELEPHONE ENCOUNTER
No care due was identified.  [facility] Embedded Care Due Messages. Reference number: [id number].   9/07/2023 12:19:58 PM CDT

## 2023-09-08 DIAGNOSIS — I10 BENIGN ESSENTIAL HYPERTENSION: ICD-10-CM

## 2023-09-08 DIAGNOSIS — R00.2 PALPITATIONS: ICD-10-CM

## 2023-09-08 RX ORDER — HYDROCHLOROTHIAZIDE 25 MG/1
25 TABLET ORAL DAILY
Qty: 90 TABLET | Refills: 3 | Status: SHIPPED | OUTPATIENT
Start: 2023-09-08 | End: 2023-09-13 | Stop reason: SDUPTHER

## 2023-09-08 RX ORDER — LOSARTAN POTASSIUM 50 MG/1
50 TABLET ORAL DAILY
Qty: 90 TABLET | Refills: 3 | Status: SHIPPED | OUTPATIENT
Start: 2023-09-08 | End: 2023-09-13 | Stop reason: SDUPTHER

## 2023-09-08 RX ORDER — METOPROLOL SUCCINATE 25 MG/1
25 TABLET, EXTENDED RELEASE ORAL NIGHTLY
Qty: 90 TABLET | Refills: 3 | Status: SHIPPED | OUTPATIENT
Start: 2023-09-08 | End: 2023-09-13 | Stop reason: SDUPTHER

## 2023-09-13 DIAGNOSIS — R00.2 PALPITATIONS: ICD-10-CM

## 2023-09-13 DIAGNOSIS — I10 BENIGN ESSENTIAL HYPERTENSION: ICD-10-CM

## 2023-09-13 RX ORDER — METOPROLOL SUCCINATE 25 MG/1
25 TABLET, EXTENDED RELEASE ORAL NIGHTLY
Qty: 90 TABLET | Refills: 3 | Status: SHIPPED | OUTPATIENT
Start: 2023-09-13 | End: 2023-10-25

## 2023-09-13 RX ORDER — LOSARTAN POTASSIUM 50 MG/1
50 TABLET ORAL DAILY
Qty: 90 TABLET | Refills: 3 | Status: SHIPPED | OUTPATIENT
Start: 2023-09-13 | End: 2023-11-08 | Stop reason: SDUPTHER

## 2023-09-13 RX ORDER — HYDROCHLOROTHIAZIDE 25 MG/1
25 TABLET ORAL DAILY
Qty: 90 TABLET | Refills: 3 | Status: SHIPPED | OUTPATIENT
Start: 2023-09-13 | End: 2024-09-12

## 2023-09-13 NOTE — TELEPHONE ENCOUNTER
----- Message from Moody Trejo sent at 9/13/2023 11:21 AM CDT -----  Regarding: Med refill  Pt would like all of her meds be refilled at [facility] and all 90 day supply needed.       hydroCHLOROthiazide (HYDRODIURIL) 25 MG tablet- 90 day supply  metoprolol succinate (TOPROL-XL) 25 MG 24 hr tablet- 90 day supply  losartan (COZAAR) 50 MG tablet - 90 day supply     [email or web address] - [address]   Phone: [phone]  Fax:  [phone]    Contact @ [phone]    Thanks

## 2023-09-14 DIAGNOSIS — N95.1 SYMPTOMATIC MENOPAUSAL OR FEMALE CLIMACTERIC STATES: ICD-10-CM

## 2023-09-14 DIAGNOSIS — N95.1 MENOPAUSAL STATE: ICD-10-CM

## 2023-09-14 RX ORDER — ESTRADIOL 0.25 MG/.25G
1 GEL TOPICAL DAILY
Qty: 90 PACKET | Refills: 0 | Status: SHIPPED | OUTPATIENT
Start: 2023-09-14 | End: 2023-09-15 | Stop reason: DRUGHIGH

## 2023-09-14 NOTE — TELEPHONE ENCOUNTER
----- Message from January Gloria MD sent at 9/14/2023  1:05 PM CDT -----  Regarding: RE: Refil Request  One tube of cream should last 3 months.  ----- Message -----  From: Kacey Kennedy LPN  Sent: 9/13/2023   4:35 PM CDT  To: January Gloria MD  Subject: FW: Refil Request                                Patient says the cream is not lasting she needs 3 more sent to the pharmacy.  Are you okay with sending more or changing dose.      ----- Message -----  From: Angus Hopson  Sent: 9/13/2023  12:30 PM CDT  To: [facility] Staff  Subject: Refil Request                                    Who Called:SHANKAR LANG [[id number]]        New Prescription or Refill : Refill      RX Name and Strength:  estradioL (ESTRACE) 0.01 % (0.1 mg/gram) vaginal cream        The Pt says the cream is not lasting she needs 3 more sent to the pharmacy. Please assist            30 day or 90 day RX:90           Local or Mail Order :  Mail Order            Preferred Pharmacy:[facility] - [facility], [address]      Would the patient rather a call back or a response via [facility]?        Best Call Back Number:  [phone] for pharmacy

## 2023-09-15 ENCOUNTER — TELEPHONE (OUTPATIENT)
Dept: OBSTETRICS AND GYNECOLOGY | Facility: CLINIC | Age: 58
End: 2023-09-15
Payer: COMMERCIAL

## 2023-09-15 DIAGNOSIS — N95.1 MENOPAUSAL STATE: ICD-10-CM

## 2023-09-15 DIAGNOSIS — M17.12 PRIMARY OSTEOARTHRITIS OF LEFT KNEE: ICD-10-CM

## 2023-09-15 RX ORDER — ESTRADIOL 0.25 MG/.25G
1 GEL TOPICAL DAILY
Qty: 90 PACKET | Refills: 0 | Status: SHIPPED | OUTPATIENT
Start: 2023-09-15 | End: 2024-02-07 | Stop reason: SDUPTHER

## 2023-09-15 RX ORDER — NABUMETONE 750 MG/1
TABLET, FILM COATED ORAL
Qty: 60 TABLET | Refills: 0 | Status: SHIPPED | OUTPATIENT
Start: 2023-09-15 | End: 2023-11-08

## 2023-09-15 NOTE — TELEPHONE ENCOUNTER
Called and discussed with patient use of 1 gram 3xweek . Would be 12 g/month and a 42 gram tube should last 3 months. Will decrease filling applicator tube.    ----- Message from Kacey Kennedy LPN sent at 9/14/2023  2:27 PM CDT -----  Regarding: RE: Refil Request  Patient states 1 tube doesn't last a whole month using it 3 times a week with additional medication on fingeer.  ----- Message -----  From: January Gloria MD  Sent: 9/14/2023   1:06 PM CDT  To: Kacey Kennedy LPN  Subject: RE: Refil Request                                One tube of cream should last 3 months.  ----- Message -----  From: Kacey Kennedy LPN  Sent: 9/13/2023   4:35 PM CDT  To: January Gloria MD  Subject: FW: Refil Request                                Patient says the cream is not lasting she needs 3 more sent to the pharmacy.  Are you okay with sending more or changing dose.      ----- Message -----  From: Angus Hopson  Sent: 9/13/2023  12:30 PM CDT  To: [facility] Staff  Subject: Refil Request                                    Who Called:SHANKAR LANG [[id number]]        New Prescription or Refill : Refill      RX Name and Strength:  estradioL (ESTRACE) 0.01 % (0.1 mg/gram) vaginal cream        The Pt says the cream is not lasting she needs 3 more sent to the pharmacy. Please assist            30 day or 90 day RX:90           Local or Mail Order :  Mail Order            Preferred Pharmacy:[facility] - [facility], [place] [address]      Would the patient rather a call back or a response via [facility]?        Best Call Back Number:  [phone] for pharmacy

## 2023-10-12 DIAGNOSIS — G47.09 OTHER INSOMNIA: ICD-10-CM

## 2023-10-12 RX ORDER — ZOLPIDEM TARTRATE 10 MG/1
10 TABLET ORAL NIGHTLY PRN
Qty: 30 TABLET | Refills: 0 | Status: SHIPPED | OUTPATIENT
Start: 2023-10-12 | End: 2023-11-10

## 2023-10-12 NOTE — TELEPHONE ENCOUNTER
Care Due:                  Date            Visit Type   Department     Provider  --------------------------------------------------------------------------------                                ESTABLISHED                              PATIENT -    [facility]  Last Visit: 07-      [facility]      CARE           David Bose  Next Visit: None Scheduled  None         None Found                                                            Last  Test          Frequency    Reason                     Performed    Due Date  --------------------------------------------------------------------------------    CBC.........  12 months..  nabumetone.Dejuan............  03- 03-    [facility] Embedded Care Due Messages. Reference number: [id number].   10/12/2023 1:09:29 PM CDT

## 2023-10-25 ENCOUNTER — OFFICE VISIT (OUTPATIENT)
Dept: CARDIOLOGY | Facility: CLINIC | Age: 58
End: 2023-10-25
Payer: COMMERCIAL

## 2023-10-25 ENCOUNTER — OFFICE VISIT (OUTPATIENT)
Dept: OBSTETRICS AND GYNECOLOGY | Facility: CLINIC | Age: 58
End: 2023-10-25
Attending: OBSTETRICS & GYNECOLOGY
Payer: COMMERCIAL

## 2023-10-25 VITALS
HEIGHT: 68 IN | SYSTOLIC BLOOD PRESSURE: 114 MMHG | BODY MASS INDEX: 26.1 KG/M2 | DIASTOLIC BLOOD PRESSURE: 80 MMHG | WEIGHT: 172.19 LBS

## 2023-10-25 VITALS
HEART RATE: 62 BPM | DIASTOLIC BLOOD PRESSURE: 68 MMHG | SYSTOLIC BLOOD PRESSURE: 101 MMHG | BODY MASS INDEX: 26.13 KG/M2 | WEIGHT: 172.38 LBS | HEIGHT: 68 IN

## 2023-10-25 DIAGNOSIS — I10 BENIGN ESSENTIAL HYPERTENSION: Primary | ICD-10-CM

## 2023-10-25 DIAGNOSIS — I20.89 ANGINAL EQUIVALENT: ICD-10-CM

## 2023-10-25 DIAGNOSIS — N95.1 MENOPAUSAL SYNDROME: Primary | ICD-10-CM

## 2023-10-25 DIAGNOSIS — E78.2 MIXED HYPERLIPIDEMIA: ICD-10-CM

## 2023-10-25 DIAGNOSIS — R40.0 DAYTIME SOMNOLENCE: ICD-10-CM

## 2023-10-25 DIAGNOSIS — R00.2 PALPITATIONS: ICD-10-CM

## 2023-10-25 PROCEDURE — 99999 PR PBB SHADOW E&M-EST. PATIENT-LVL III: CPT | Mod: PBBFAC,,, | Performed by: INTERNAL MEDICINE

## 2023-10-25 PROCEDURE — 99214 OFFICE O/P EST MOD 30 MIN: CPT | Mod: S$GLB,,, | Performed by: OBSTETRICS & GYNECOLOGY

## 2023-10-25 PROCEDURE — 99999 PR PBB SHADOW E&M-EST. PATIENT-LVL III: ICD-10-PCS | Mod: PBBFAC,,, | Performed by: OBSTETRICS & GYNECOLOGY

## 2023-10-25 PROCEDURE — 99999 PR PBB SHADOW E&M-EST. PATIENT-LVL III: CPT | Mod: PBBFAC,,, | Performed by: OBSTETRICS & GYNECOLOGY

## 2023-10-25 PROCEDURE — 99214 PR OFFICE/OUTPT VISIT, EST, LEVL IV, 30-39 MIN: ICD-10-PCS | Mod: S$GLB,,, | Performed by: INTERNAL MEDICINE

## 2023-10-25 PROCEDURE — 99214 PR OFFICE/OUTPT VISIT, EST, LEVL IV, 30-39 MIN: ICD-10-PCS | Mod: S$GLB,,, | Performed by: OBSTETRICS & GYNECOLOGY

## 2023-10-25 PROCEDURE — 99999 PR PBB SHADOW E&M-EST. PATIENT-LVL III: ICD-10-PCS | Mod: PBBFAC,,, | Performed by: INTERNAL MEDICINE

## 2023-10-25 PROCEDURE — 99214 OFFICE O/P EST MOD 30 MIN: CPT | Mod: S$GLB,,, | Performed by: INTERNAL MEDICINE

## 2023-10-25 RX ORDER — PROMETHAZINE HYDROCHLORIDE 25 MG/1
25-50 TABLET ORAL EVERY 8 HOURS PRN
COMMUNITY
Start: 2023-09-13

## 2023-10-25 NOTE — PROGRESS NOTES
Sharri Hill is a 58 y.o. female who is here for follow-up of hormone replacement therapy.  At her last visit on June 1,2023, she said  Patient began HRT with Dr. Hudson in July 2022 for management of hot flashes, moodiness, vaginal dryness, anxiety, decreased libido (mainly due to dyspareunia), insomnia, hair loss, palpitations, and joint pain. She was placed on Estrace 1mg and prometrium 100mg.   First episode of bleeding was within first three months of use with mostly spotting and some heavier bleeding like a light cycle day . She states she stopped the Estradiol within a few weeks after beginning the bleeding. An attempt at EMB was made by Urogyn twice with pretreatment with CYtotec, but unable to be completed due to cervical stenosis. Ultrasound showed a thin Endometrial stripe, with a possible small endocervical polyp. Primary concern is Pain with sex, which has improved with use of the Lidocain,2% Diazepam 5, Baclo 4%suppositories given by Urogyn to treat this.      An additional concern is her recent elevated Blood Pressure which seems to have begun a few months ago, states it had never been noted prior to beginning her HRT, and she is seeing Cardiology for this as well as for the palpitations she has been having, although the palpitations began prior to starting HRT.     Since that time, she has had a Hysteroscopy with D&C May 11,2023 by Dr. Yañez which showed:   ENDOMETRIUM, CURETTAGE:   - No endometrial tissue present for evaluation.   - Squamous ectocervical mucosa with features suggestive of low- grade squamous intraepithelial lesion/cervical intraepithelial neoplasia 1 (ESTELLA 1).   Repeat Pap May 25: Normal and Negative for HR HPV     She has also undergone evaluation with Cardiology for the HTN and Palpitations which has been negative.     Her main concern today(JUne 1) is She Hasn't felt well since November. Still gets palpitations and still has discomfort with sex. She feels tired, sluggish and  has weight gain. She also has thinning head hair. (Has started Nutrafol ). .  She also mentioned that she has 2-3 loose stools every morning, and has been on medication for peristalsis because she was found to have small ulcerations in her stomach/esophagus.        PLAN on June 1,2023:  Continue:  Prometrium 100mg nightly      Vaginal estradiol cream 1 gram three times weekly     Start:  Divigel 0.25 daily    She is currently taking Divigel . 0.25 daily and prometrium 100mg HS. With vaginal estradiol 3 x week .  The patient states the following symptoms have improved:  hair loss has slowed, but not yet regrown. Mood is better since her Lexapro was increased from 10-20mg by her Gynecologist, Dr. Hunt, but has trouble getting out of bed and finds it difficult to go out with friends,(feels like it is almost too much effort and not as enjoyable as in the past).   .  Her main concern today is continued dyspareunia.  This is somewhat improved with use of the compounded suppositories from Urogyn of Valium 5/Lidocaine 2% and Baclofen 4% which she has to use precoital  She also continues to have some palpitations, sees Cardiology , and has still been having frequent loose stools in the morning after eating, but this has reduced to 3-4 instead of 6-7 x day since she is off Metoprolol. We discussed that the fatigue may be a result of her multiple GI issues which can be draining. Some of her symptoms seem to be depression related  .  Patient denies post-menopausal vaginal bleeding. The patient is sexually active.     No visits with results within 3 Month(s) from this visit.   Latest known visit with results is:   Office Visit on 05/25/2023   Component Date Value Ref Range Status    Final Pathologic Diagnosis 05/25/2023    Final                    Value:Specimen Adequacy  Satisfactory for interpretation. Endocervical component is present.    [facility] Category  Negative for intraepithelial lesion or malignancy     Final  Diagnostic Interpretation  Negative for intraepithelial lesion or malignancy. Reactive cellular changes.  Inflammation present.      Disclaimer 2023    Final                    Value:The Pap smear is a screening test that aids in the detection of cervical cancer and cancer precursors. Both false positive and false negative results can occur. The test should be used at regular intervals, and positive results should be confirmed before   definitive therapy.  This liquid based specimen is processed using the  or  Thin PrepPAP System. This specimen has been analyzed by the ThinPrep Imaging System ([employer]), an automated imaging and review system which assists the laboratory in evaluating   cells on ThinPrep PAP tests. Following automated imaging, selected fields from every slide are reviewed by a cytotechnologist and/or pathologist.     Screening was performed at [facility], [address] [place] [id number].      HPV other High Risk types, PCR 2023 Negative  Negative Final    HPV High Risk type 16, PCR 2023 Negative  Negative Final    HPV High Risk type 18, PCR 2023 Negative  Negative Final       Past Medical History:   Diagnosis Date    Abnormal Pap smear of cervix     Cryo Done ([place])     Anxiety     Esophageal reflux     History of shingles ,     Right arm   and  Left hip     Menopause     Migraine     Urge incontinence      Past Surgical History:   Procedure Laterality Date    APPENDECTOMY       SECTION      COLONOSCOPY  [id number]    Polyp in the transverse. Repeat in 5 years    COSMETIC SURGERY      Nose    HYSTEROSCOPY WITH DILATION AND CURETTAGE OF UTERUS N/A 2023    Procedure: HYSTEROSCOPY, WITH DILATION AND CURETTAGE OF UTERUS;  Surgeon: Karoline Hunt MD;  Location: [facility];  Service: OB/GYN;  Laterality: N/A;    KNEE SURGERY       Social History      Tobacco Use    Smoking status: Never    Smokeless tobacco: Never   Substance Use Topics    Alcohol use: Yes     Alcohol/week: 6.0 standard drinks of alcohol     Types: 6 Glasses of wine per week     Comment: Something more    Drug use: No     Family History   Problem Relation Age of Onset    Diabetes Maternal Grandmother     Diabetes Maternal Grandfather     Hypertension Father     Hyperlipidemia Father     Coronary artery disease Father 60    Heart disease Father     Depression Mother     Kidney disease Sister     Breast cancer Maternal Aunt          at age 40    Colon cancer Neg Hx     Ovarian cancer Neg Hx     Cancer Neg Hx     Stroke Neg Hx      OB History    Para Term  AB Living   1 1 1     1   SAB IAB Ectopic Multiple Live Births           1      # Outcome Date GA Lbr Dylan/2nd Weight Sex Delivery Anes PTL Lv   1 Term 95 40w0d  2.495 kg (5 lb 8 oz) F CS-Unspec   EDYTA       Current Outpatient Medications:     butalbital-acetaminophen-caff -40 mg Cap, , Disp: , Rfl:     EScitalopram oxalate (LEXAPRO) 20 MG tablet, Take 1 tablet (20 mg total) by mouth once daily., Disp: 90 tablet, Rfl: 0    estradioL (DIVIGEL) 0.25 mg/0.25 gram (0.1 %) GlPk, Place 1 packet onto the skin Daily., Disp: 90 packet, Rfl: 0    estradioL (ESTRACE) 0.01 % (0.1 mg/gram) vaginal cream, Place 1 g vaginally 3 (three) times a week., Disp: 42.5 g, Rfl: 3    gabapentin (NEURONTIN) 300 MG capsule, Take 1 capsule by mouth 2 (two) times daily., Disp: , Rfl:     hydroCHLOROthiazide (HYDRODIURIL) 25 MG tablet, Take 1 tablet (25 mg total) by mouth once daily. (Patient taking differently: Take 25 mg by mouth 2 (two) times daily.), Disp: 90 tablet, Rfl: 3    HYDROcodone-acetaminophen (NORCO) 7.5-325 mg per tablet, Take 1 tablet by mouth 2 (two) times daily as needed., Disp: , Rfl:     losartan (COZAAR) 50 MG tablet, Take 1 tablet (50 mg total) by mouth once daily., Disp: 90 tablet, Rfl: 3    nabumetone (RELAFEN) 750 MG  "tablet, TAKE ONE TABLET BY MOUTH TWICE A DAY, Disp: 60 tablet, Rfl: 0    progesterone (PROMETRIUM) 100 MG capsule, Take 1 capsule by mouth 30-60 minutes before bed every night, Disp: 90 capsule, Rfl: 1    promethazine (PHENERGAN) 25 MG tablet, Take 25-50 mg by mouth every 8 (eight) hours as needed., Disp: , Rfl:     PROTONIX 40 mg tablet, Take 40 mg by mouth every morning., Disp: , Rfl:     tiZANidine (ZANAFLEX) 4 MG tablet, Take 4 mg by mouth 2 (two) times daily., Disp: , Rfl:     tolterodine (DETROL LA) 4 MG 24 hr capsule, Take 1 capsule (4 mg total) by mouth once daily., Disp: 90 capsule, Rfl: 3    zolpidem (AMBIEN) 10 mg Tab, TAKE ONE TABLET BY MOUTH AT BEDTIME AS NEEDED, Disp: 30 tablet, Rfl: 0    Vitals:    10/25/23 1100   BP: 114/80   Weight: 78.1 kg (172 lb 3.2 oz)   Height: 5' 8" (1.727 m)   PainSc: 0-No pain     Body mass index is 26.18 kg/m².       Assessment:    Menopausal syndrome        Plan:   Risks and benefits of hormone replacement therapy were discussed.  Hormone replacement therapy options, including bioidentical versus non-bioidentical hormones, as well as alternatives discussed.    Continue:    Divigel 0.25 and Prometrium 100mg    Consider low dose testosterone( for beneficial side effect of more energy) , but with concern about possible worsening of hair loss.     Advised to schedule for Psych counseling based on depressive symptoms for possible medication change     Follow up in 4-6 months.  Instructed patient to call if she experiences any side effects or has any questions.      "

## 2023-10-25 NOTE — PROGRESS NOTES
Subjective:   10/25/2023     Patient ID:  Sharri Hill is a 58 y.o. female who presents for evaulation of Hypertension      Comes in for follow-up.  Her blood pressures and palpitations appear to be improved on combination therapy with hydrochlorothiazide and losartan.  She takes the losartan 50 mg twice a day and hydrochlorothiazide once a day.  She notes that her blood pressures in the afternoon much higher than they are in the morning.  She is still very fatigued throughout the day.  Her  notes that she has severe snoring.  Sleep apnea is questioned.  She does take Ambien at night.  She is very sleepy throughout the morning.    She continues to have diarrhea.  Metoprolol was discontinued, has not made much of a difference.  She takes Imodium every morning.    Echocardiography in 4/23 was normal.      Cardiac risk low.      She does have exertional dyspnea.    Sleep disordered breathing is present, severe daytime somnolence.  Refer to sleep Medicine.      Previous note:    Here for preop clearance and evaluation for hypertension.  She had a workup for secondary causes of hypertension which was negative.  Her blood pressures at home have been running proximally 140/72.  Still slightly elevated on losartan 25 and HCTZ 25 mg daily.  She had previously had problem with hyperkalemia on losartan alone.  Her last potassium was 4.2 on that combination.      She does not have chest pains tightness PND orthopnea.      She is scheduled for gyn surgery    The 10-year ASCVD risk score (Delroy JUNG, et al., 2019) is: 3.1%    Values used to calculate the score:      Age: 57 years      Sex: Female      Is Non- : No      Diabetic: No      Tobacco smoker: No      Systolic Blood Pressure: 132 mmHg      Is BP treated: Yes      HDL Cholesterol: 78 mg/dL      Total Cholesterol: 214 mg/dL        Prior note:    Comes in for cardiac evaluation, she had new onset hypertension several months ago, she is felt  poorly ever since then.  She is able to exercise without chest pains or tightness, PND or orthopnea.  A Holter monitor showed rare PACs and PVCs.  Renal function has been normal.  The hypertension did appear to be a fairly abrupt onset.  It may have been associated with a steroid shot.      She does not smoke cigarettes.  She drinks alcohol occasionally.  She has not been diagnosed with sleep apnea, but she does snore.    She has felt better on the current combination losartan HCT.    Prior note:   Sharri Hill is a 57 y.o. female with past medical history of hypertension who showed up to clinic for routine follow-up. Has been uncontrolled over the last few months, currently she is on nifedipine 60 mg and losartan 25 mg daily. She has not feeling well and she is fatigued all the time. Blood pressure check at home SBP 130s to 150s, she reported bilateral ankle edema.    Current Medications:  Current Outpatient Medications on File Prior to Visit   Medication Sig Dispense Refill   · butalbital-acetaminophen-caff -40 mg Cap capsule   · escitalopram oxalate (LEXAPRO) 10 MG tablet Take 10 mg daily by mouth   · escitalopram oxalate (LEXAPRO) 20 MG tablet Take 20 mg daily by mouth   · estradioL (ESTRACE) 0.01 % (0.1 mg/gram) vaginal cream   · gabapentin (NEURONTIN) 300 MG capsule Take 300 mg daily by mouth   · HYDROcodone-acetaminophen (NORCO) 7.5-325 mg per tablet Take 1 tablet as needed by mouth   · losartan (COZAAR) 25 MG tablet Take 1 tablet daily by mouth 30 tablet 11   · miSOPROStoL (CYTOTEC) 200 MCG tablet Take 200 mcg by mouth   · nabumetone (RELAFEN) 750 MG tablet Take 750 mg 2 (two) times daily by mouth   · pantoprazole (PROTONIX) 40 MG tablet Take 40 mg daily by mouth   · progesterone (PROMETRIUM) 100 MG capsule Take 100 mg daily by mouth   · promethazine (PHENERGAN) 25 MG tablet Take 25 mg as needed by mouth   · sucralfate (CARAFATE) 1 gram tablet Take 1 g as needed by mouth   · tiZANidine (ZANAFLEX) 4  MG tablet Take 4 mg 2 (two) times a day by mouth   · tolterodine (DETROL LA) 4 MG 24 hr capsule Take 4 mg daily by mouth   · XIIDRA 5 % Dpet   · zolpidem (AMBIEN) 10 mg tablet Take 10 mg daily by mouth   · [DISCONTINUED] NIFEdipine (ADALAT CC) 60 MG 24 hr tablet Take 1 tablet daily by mouth 30 tablet 5   · [DISCONTINUED] MYRBETRIQ 50 mg Tb24 Take 50 mg daily by mouth     No current facility-administered medications on file prior to visit.     PROBLEM LIST:     Encounter Diagnoses   Name Primary?   · Essential hypertension     Plan:     # uncontrolled hypertension. Will discontinue nifedipine and started on hydrochlorothiazide 25 mg daily in addition to losartan 25 mg daily. BMP ordered    Follow-up in 3 months      Past Medical History:   Diagnosis Date    Abnormal Pap smear of cervix 1992    Cryo Done ([place])     Anxiety     Esophageal reflux     History of shingles 2012, 2021    Right arm 2012  and  Left hip 2021    Menopause 2018    Migraine     Urge incontinence        Review of patient's allergies indicates:  No Known Allergies      Current Outpatient Medications:     EScitalopram oxalate (LEXAPRO) 20 MG tablet, Take 1 tablet (20 mg total) by mouth once daily., Disp: 90 tablet, Rfl: 0    estradioL (DIVIGEL) 0.25 mg/0.25 gram (0.1 %) GlPk, Place 1 packet onto the skin Daily., Disp: 90 packet, Rfl: 0    estradioL (ESTRACE) 0.01 % (0.1 mg/gram) vaginal cream, Place 1 g vaginally 3 (three) times a week., Disp: 42.5 g, Rfl: 3    gabapentin (NEURONTIN) 300 MG capsule, Take 1 capsule by mouth 2 (two) times daily., Disp: , Rfl:     hydroCHLOROthiazide (HYDRODIURIL) 25 MG tablet, Take 1 tablet (25 mg total) by mouth once daily. (Patient taking differently: Take 25 mg by mouth 2 (two) times daily.), Disp: 90 tablet, Rfl: 3    HYDROcodone-acetaminophen (NORCO) 7.5-325 mg per tablet, Take 1 tablet by mouth 2 (two) times daily as needed., Disp: , Rfl:     losartan (COZAAR) 50 MG tablet, Take 1 tablet (50 mg total) by  mouth once daily., Disp: 90 tablet, Rfl: 3    nabumetone (RELAFEN) 750 MG tablet, TAKE ONE TABLET BY MOUTH TWICE A DAY, Disp: 60 tablet, Rfl: 0    progesterone (PROMETRIUM) 100 MG capsule, Take 1 capsule by mouth 30-60 minutes before bed every night, Disp: 90 capsule, Rfl: 1    PROTONIX 40 mg tablet, Take 40 mg by mouth every morning., Disp: , Rfl:     tiZANidine (ZANAFLEX) 4 MG tablet, Take 4 mg by mouth 2 (two) times daily., Disp: , Rfl:     tolterodine (DETROL LA) 4 MG 24 hr capsule, Take 1 capsule (4 mg total) by mouth once daily., Disp: 90 capsule, Rfl: 3    zolpidem (AMBIEN) 10 mg Tab, TAKE ONE TABLET BY MOUTH AT BEDTIME AS NEEDED, Disp: 30 tablet, Rfl: 0    butalbital-acetaminophen-caff -40 mg Cap, , Disp: , Rfl:     promethazine (PHENERGAN) 25 MG tablet, Take 25-50 mg by mouth every 8 (eight) hours as needed., Disp: , Rfl:      Objective:   Review of Systems   Cardiovascular:  Positive for dyspnea on exertion. Negative for chest pain, claudication, cyanosis, irregular heartbeat, leg swelling, near-syncope, orthopnea, palpitations, paroxysmal nocturnal dyspnea and syncope.   Gastrointestinal:  Positive for diarrhea.         Vitals:    10/25/23 0930   BP: 101/68   Pulse: 62     Wt Readings from Last 3 Encounters:   10/25/23 78.2 kg (172 lb 6.4 oz)   08/02/23 78.1 kg (172 lb 2.9 oz)   06/01/23 77.6 kg (171 lb)     Temp Readings from Last 3 Encounters:   05/11/23 98.4 °F (36.9 °C) (Temporal)   04/11/23 97.4 °F (36.3 °C) (Oral)   12/12/22 98.6 °F (37 °C) (Oral)     BP Readings from Last 3 Encounters:   10/25/23 101/68   08/02/23 105/70   06/01/23 124/79     Pulse Readings from Last 3 Encounters:   10/25/23 62   08/02/23 67   06/01/23 75             Physical Exam  Vitals reviewed.   Constitutional:       General: She is not in acute distress.     Appearance: She is well-developed.   HENT:      Head: Normocephalic and atraumatic.      Nose: Nose normal.   Eyes:      Conjunctiva/sclera: Conjunctivae normal.       Pupils: Pupils are equal, round, and reactive to light.   Neck:      Vascular: No carotid bruit or JVD.   Cardiovascular:      Rate and Rhythm: Normal rate and regular rhythm.      Pulses: Normal pulses and intact distal pulses.      Heart sounds: Normal heart sounds. No murmur heard.     No friction rub. No gallop.   Pulmonary:      Effort: Pulmonary effort is normal. No respiratory distress.      Breath sounds: Normal breath sounds. No wheezing or rales.   Chest:      Chest wall: No tenderness.   Abdominal:      General: Bowel sounds are normal. There is no distension.      Palpations: Abdomen is soft.      Tenderness: There is no abdominal tenderness.   Musculoskeletal:         General: No tenderness or deformity. Normal range of motion.      Cervical back: Normal range of motion and neck supple.      Right lower leg: No edema.      Left lower leg: No edema.   Skin:     General: Skin is warm and dry.      Findings: No erythema or rash.   Neurological:      Mental Status: She is alert and oriented to person, place, and time.      Cranial Nerves: No cranial nerve deficit.      Motor: No abnormal muscle tone.      Coordination: Coordination normal.   Psychiatric:         Behavior: Behavior normal.         Thought Content: Thought content normal.         Judgment: Judgment normal.           Lab Results   Component Value Date    CHOL 214 (H) 12/22/2022    CHOL 256 (H) 03/30/2022    CHOL 239 (H) 02/09/2021     Lab Results   Component Value Date    HDL 78 (H) 12/22/2022    HDL 89 (H) 03/30/2022    HDL 90 (H) 02/09/2021     Lab Results   Component Value Date    LDLCALC 125 12/22/2022    LDLCALC 154.8 03/30/2022    LDLCALC 133.4 02/09/2021     Lab Results   Component Value Date    ALT 23 03/30/2022    AST 30 03/30/2022    AST 28 02/09/2021    AST 19 09/17/2019     Lab Results   Component Value Date    CREATININE 0.9 04/11/2023    BUN 15 04/11/2023     04/11/2023    K 4.7 04/11/2023    CO2 24 04/11/2023    CO2 24  03/30/2022    CO2 31 (H) 02/09/2021     Lab Results   Component Value Date    HGB 13.6 03/30/2022    HCT 40.9 03/30/2022    HCT 40.9 02/09/2021    HCT 40.3 09/17/2019                         Assessment and Plan:     Benign essential hypertension  Comments:  Change losartan to 100 mg every morning, continue hydrochlorothiazide  Evaluate for sleep apnea    Mixed hyperlipidemia  Comments:  Cardiac risk low    Palpitations  Comments:  Currently quiescent    Daytime somnolence  Comments:  Referral to sleep Medicine  Orders:  -     Ambulatory referral/consult to Sleep Disorders; Future; Expected date: 11/01/2023    Anginal equivalent  Comments:  Stress echocardiogram to be obtained, she has had poor exertional tolerance, that is a change of onset of recently  Orders:  -     Stress Echo Which stress agent will be used? Treadmill Exercise; Color Flow Doppler? No; Future           No follow-ups on file.          Future Appointments   Date Time Provider Department Center   10/25/2023 11:00 AM January Gloria MD [facility]   10/31/2023  2:30 PM CV [facility] [place]

## 2023-10-31 ENCOUNTER — HOSPITAL ENCOUNTER (OUTPATIENT)
Dept: CARDIOLOGY | Facility: HOSPITAL | Age: 58
Discharge: HOME | End: 2023-10-31
Attending: INTERNAL MEDICINE
Payer: COMMERCIAL

## 2023-10-31 DIAGNOSIS — I20.89 ANGINAL EQUIVALENT: ICD-10-CM

## 2023-10-31 LAB
ASCENDING AORTA: 3.4 CM
CV ECHO LV RWT: 0.33 CM
CV STRESS BASE HR: 63 BPM
DIASTOLIC BLOOD PRESSURE: 79 MMHG
DOP CALC LVOT AREA: 3.6 CM2
DOP CALC LVOT DIAMETER: 2.13 CM
DOP CALC LVOT PEAK VEL: 1.24 M/S
DOP CALC LVOT STROKE VOLUME: 102.25 CM3
DOP CALCLVOT PEAK VEL VTI: 28.71 CM
E WAVE DECELERATION TIME: 243.33 MSEC
E/A RATIO: 2.28
E/E' RATIO: 12 M/S
ECHO LV POSTERIOR WALL: 0.71 CM (ref 0.6–1.1)
FRACTIONAL SHORTENING: 34 % (ref 28–44)
INTERVENTRICULAR SEPTUM: 0.6 CM (ref 0.6–1.1)
LA MAJOR: 4.51 CM
LA MINOR: 4.38 CM
LA WIDTH: 3.53 CM
LEFT ATRIUM SIZE: 3.41 CM
LEFT ATRIUM VOLUME MOD: 38.31 CM3
LEFT ATRIUM VOLUME: 45.47 CM3
LEFT INTERNAL DIMENSION IN SYSTOLE: 2.87 CM (ref 2.1–4)
LEFT VENTRICLE DIASTOLIC VOLUME: 85.62 ML
LEFT VENTRICLE SYSTOLIC VOLUME: 31.43 ML
LEFT VENTRICULAR INTERNAL DIMENSION IN DIASTOLE: 4.36 CM (ref 3.5–6)
LEFT VENTRICULAR MASS: 83.3 G
LV LATERAL E/E' RATIO: 10.36 M/S
LV SEPTAL E/E' RATIO: 14.25 M/S
MV A" WAVE DURATION": 21.69 MSEC
MV PEAK A VEL: 0.5 M/S
MV PEAK E VEL: 1.14 M/S
MV STENOSIS PRESSURE HALF TIME: 70.57 MS
MV VALVE AREA P 1/2 METHOD: 3.12 CM2
OHS CV CPX 1 MINUTE RECOVERY HEART RATE: 122 BPM
OHS CV CPX 85 PERCENT MAX PREDICTED HEART RATE MALE: 137
OHS CV CPX ESTIMATED METS: 8
OHS CV CPX MAX PREDICTED HEART RATE: 161
OHS CV CPX PATIENT IS FEMALE: 1
OHS CV CPX PATIENT IS MALE: 0
OHS CV CPX PEAK DIASTOLIC BLOOD PRESSURE: 93 MMHG
OHS CV CPX PEAK HEAR RATE: 150 BPM
OHS CV CPX PEAK RATE PRESSURE PRODUCT: NORMAL
OHS CV CPX PEAK SYSTOLIC BLOOD PRESSURE: 207 MMHG
OHS CV CPX PERCENT MAX PREDICTED HEART RATE ACHIEVED: 97
OHS CV CPX RATE PRESSURE PRODUCT PRESENTING: 8757
PISA TR MAX VEL: 2.61 M/S
PULM VEIN S/D RATIO: 1.26
PV PEAK D VEL: 0.65 M/S
PV PEAK S VEL: 0.82 M/S
RA MAJOR: 4.09 CM
RA PRESSURE ESTIMATED: 3 MMHG
RA WIDTH: 2.8 CM
RIGHT VENTRICULAR END-DIASTOLIC DIMENSION: 2.73 CM
RV TB RVSP: 6 MMHG
SINUS: 2.87 CM
STJ: 3.05 CM
STRESS ECHO POST EXERCISE DUR MIN: 6 MINUTES
STRESS ECHO POST EXERCISE DUR SEC: 8 SECONDS
SYSTOLIC BLOOD PRESSURE: 139 MMHG
TDI LATERAL: 0.11 M/S
TDI SEPTAL: 0.08 M/S
TDI: 0.1 M/S
TR MAX PG: 27 MMHG
TRICUSPID ANNULAR PLANE SYSTOLIC EXCURSION: 2.16 CM
TV REST PULMONARY ARTERY PRESSURE: 30 MMHG

## 2023-10-31 PROCEDURE — 93351 STRESS TTE COMPLETE: CPT

## 2023-10-31 PROCEDURE — 93351 STRESS TTE COMPLETE: CPT | Mod: 26,,, | Performed by: INTERNAL MEDICINE

## 2023-10-31 PROCEDURE — 93351 STRESS ECHO (CUPID ONLY): ICD-10-PCS | Mod: 26,,, | Performed by: INTERNAL MEDICINE

## 2023-11-08 ENCOUNTER — PATIENT MESSAGE (OUTPATIENT)
Dept: CARDIOLOGY | Facility: CLINIC | Age: 58
End: 2023-11-08
Payer: COMMERCIAL

## 2023-11-08 DIAGNOSIS — I10 BENIGN ESSENTIAL HYPERTENSION: ICD-10-CM

## 2023-11-08 RX ORDER — LOSARTAN POTASSIUM 50 MG/1
50 TABLET ORAL DAILY
Qty: 90 TABLET | Refills: 3 | Status: SHIPPED | OUTPATIENT
Start: 2023-11-08 | End: 2023-11-14 | Stop reason: SDUPTHER

## 2023-11-08 RX ORDER — ESCITALOPRAM OXALATE 20 MG/1
20 TABLET ORAL
Qty: 90 TABLET | Refills: 3 | Status: SHIPPED | OUTPATIENT
Start: 2023-11-08

## 2023-11-10 DIAGNOSIS — G47.09 OTHER INSOMNIA: ICD-10-CM

## 2023-11-10 RX ORDER — ZOLPIDEM TARTRATE 10 MG/1
10 TABLET ORAL NIGHTLY PRN
Qty: 30 TABLET | Refills: 0 | Status: SHIPPED | OUTPATIENT
Start: 2023-11-10 | End: 2023-12-12

## 2023-11-10 NOTE — TELEPHONE ENCOUNTER
No care due was identified.  [facility] Embedded Care Due Messages. Reference number: [id number].   11/10/2023 9:44:21 AM CST

## 2023-11-14 DIAGNOSIS — I10 BENIGN ESSENTIAL HYPERTENSION: ICD-10-CM

## 2023-11-14 RX ORDER — LOSARTAN POTASSIUM 50 MG/1
50 TABLET ORAL DAILY
Qty: 90 TABLET | Refills: 4 | Status: SHIPPED | OUTPATIENT
Start: 2023-11-14 | End: 2023-11-15 | Stop reason: SDUPTHER

## 2023-11-14 RX ORDER — LOSARTAN POTASSIUM 50 MG/1
50 TABLET ORAL DAILY
Qty: 90 TABLET | Refills: 3 | Status: SHIPPED | OUTPATIENT
Start: 2023-11-14 | End: 2023-11-14 | Stop reason: SDUPTHER

## 2023-11-15 DIAGNOSIS — I10 BENIGN ESSENTIAL HYPERTENSION: ICD-10-CM

## 2023-11-15 RX ORDER — LOSARTAN POTASSIUM 50 MG/1
50 TABLET ORAL DAILY
Qty: 90 TABLET | Refills: 4 | Status: SHIPPED | OUTPATIENT
Start: 2023-11-15 | End: 2023-11-21 | Stop reason: DRUGHIGH

## 2023-11-20 NOTE — TELEPHONE ENCOUNTER
----- Message from Ximena Khan sent at 11/20/2023  3:56 PM CST -----  Contact: [phone]  [email or web address]     Patient is calling for Medical Advice regarding: pt states she would like to verbally speak with nurse regarding medications.

## 2023-11-21 RX ORDER — LOSARTAN POTASSIUM 100 MG/1
100 TABLET ORAL DAILY
Qty: 90 TABLET | Refills: 3 | Status: SHIPPED | OUTPATIENT
Start: 2023-11-21 | End: 2024-11-20

## 2023-11-21 RX ORDER — LOSARTAN POTASSIUM 100 MG/1
100 TABLET ORAL DAILY
COMMUNITY
End: 2023-11-21 | Stop reason: SDUPTHER

## 2023-12-11 ENCOUNTER — OFFICE VISIT (OUTPATIENT)
Dept: PRIMARY CARE CLINIC | Facility: CLINIC | Age: 58
End: 2023-12-11
Payer: COMMERCIAL

## 2023-12-11 DIAGNOSIS — B02.9 HERPES ZOSTER WITHOUT COMPLICATION: Primary | ICD-10-CM

## 2023-12-11 PROCEDURE — 99214 PR OFFICE/OUTPT VISIT, EST, LEVL IV, 30-39 MIN: ICD-10-PCS | Mod: 95,,, | Performed by: INTERNAL MEDICINE

## 2023-12-11 PROCEDURE — 99214 OFFICE O/P EST MOD 30 MIN: CPT | Mod: 95,,, | Performed by: INTERNAL MEDICINE

## 2023-12-11 RX ORDER — GABAPENTIN 100 MG/1
100 CAPSULE ORAL 3 TIMES DAILY
Qty: 90 CAPSULE | Refills: 0 | Status: SHIPPED | OUTPATIENT
Start: 2023-12-11 | End: 2023-12-18 | Stop reason: SDUPTHER

## 2023-12-11 RX ORDER — VALACYCLOVIR HYDROCHLORIDE 1 G/1
1000 TABLET, FILM COATED ORAL 3 TIMES DAILY
Qty: 21 TABLET | Refills: 0 | Status: SHIPPED | OUTPATIENT
Start: 2023-12-11 | End: 2024-12-10

## 2023-12-11 NOTE — PROGRESS NOTES
The patient location is: home   The chief complaint leading to consultation is: shingles     Visit type: audiovisual    Face to Face time with patient: 10 min  15 minutes of total time spent on the encounter, which includes face to face time and non-face to face time preparing to see the patient (eg, review of tests), Obtaining and/or reviewing separately obtained history, Documenting clinical information in the electronic or other health record, Independently interpreting results (not separately reported) and communicating results to the patient/family/caregiver, or Care coordination (not separately reported).         Each patient to whom he or she provides medical services by telemedicine is:  (1) informed of the relationship between the physician and patient and the respective role of any other health care provider with respect to management of the patient; and (2) notified that he or she may decline to receive medical services by telemedicine and may withdraw from such care at any time.    Notes:  [facility] Note    Chief Complaint      Chief Complaint   Patient presents with    Herpes Zoster       History of Present Illness      Sharri Hill is a 58 y.o. female who presents today for   Chief Complaint   Patient presents with    Herpes Zoster   .  Patient comes to appointment here for eliseo valencia he has beeb dealignwith neck pain sionce laste November early December has been seeing her thomas sawant md , was given steroid pack no relief . Today she noted rash to her left neck in some patter as her pain . She has sent opicture which is in chart . .    Problem List Items Addressed This Visit          ID    Herpes zoster without complication - Primary    Overview     Valtrex 100 mg tid disp 21  Increase gabapentin 100 mg to tid               Past Medical History:  Past Medical History:   Diagnosis Date    Abnormal Pap smear of cervix 1992    Cryo Done ([place])     Anxiety      Esophageal reflux     History of shingles ,     Right arm   and  Left hip     Menopause 2018    Migraine     Urge incontinence        Past Surgical History:  Past Surgical History:   Procedure Laterality Date    APPENDECTOMY       SECTION      COLONOSCOPY  [id number]    Polyp in the transverse. Repeat in 5 years    COSMETIC SURGERY      Nose    HYSTEROSCOPY WITH DILATION AND CURETTAGE OF UTERUS N/A 2023    Procedure: HYSTEROSCOPY, WITH DILATION AND CURETTAGE OF UTERUS;  Surgeon: Karoline Hunt MD;  Location: [facility] OR;  Service: OB/GYN;  Laterality: N/A;    KNEE SURGERY         Family History:  family history includes Breast cancer in her maternal aunt; Coronary artery disease (age of onset: 60) in her father; Depression in her mother; Diabetes in her maternal grandfather and maternal grandmother; Heart disease in her father; Hyperlipidemia in her father; Hypertension in her father; Kidney disease in her sister.    Social History:  Social History     Socioeconomic History    Marital status:    Tobacco Use    Smoking status: Never    Smokeless tobacco: Never   Substance and Sexual Activity    Alcohol use: Yes     Alcohol/week: 6.0 standard drinks of alcohol     Types: 6 Glasses of wine per week     Comment: Something more    Drug use: No    Sexual activity: Yes     Partners: Male     Birth control/protection: Partner-Vasectomy, Post-menopausal     Comment:  , together since 40 years       Review of Systems:   Review of Systems   HENT:  Negative for congestion.    Eyes:  Negative for pain.   Respiratory:  Negative for cough.    Gastrointestinal:  Negative for diarrhea.   Musculoskeletal:  Positive for joint pain.   Skin:  Positive for rash.         Medications:  Outpatient Encounter Medications as of 2023   Medication Sig Note Dispense Refill    butalbital-acetaminophen-caff -40 mg Cap  5/10/2023: Hold DOS      EScitalopram oxalate (LEXAPRO) 20 MG  tablet TAKE 1 TABLET DAILY  90 tablet 3    estradioL (DIVIGEL) 0.25 mg/0.25 gram (0.1 %) GlPk Place 1 packet onto the skin Daily.  90 packet 0    estradioL (ESTRACE) 0.01 % (0.1 mg/gram) vaginal cream Place 1 g vaginally 3 (three) times a week.  42.5 g 3    gabapentin (NEURONTIN) 100 MG capsule Take 1 capsule (100 mg total) by mouth 3 (three) times daily.  90 capsule 0    hydroCHLOROthiazide (HYDRODIURIL) 25 MG tablet Take 1 tablet (25 mg total) by mouth once daily. (Patient taking differently: Take 25 mg by mouth 2 (two) times daily.)  90 tablet 3    HYDROcodone-acetaminophen (NORCO) 7.5-325 mg per tablet Take 1 tablet by mouth 2 (two) times daily as needed. 5/10/2023: May take DOS      losartan (COZAAR) 100 MG tablet Take 1 tablet (100 mg total) by mouth once daily.  90 tablet 3    nabumetone (RELAFEN) 750 MG tablet Take 1 tablet (750 mg total) by mouth once daily.  30 tablet 1    progesterone (PROMETRIUM) 100 MG capsule Take 1 capsule by mouth 30-60 minutes before bed every night  90 capsule 1    promethazine (PHENERGAN) 25 MG tablet Take 25-50 mg by mouth every 8 (eight) hours as needed.       PROTONIX 40 mg tablet Take 40 mg by mouth every morning. 5/10/2023: Take DOS      tiZANidine (ZANAFLEX) 4 MG tablet Take 4 mg by mouth 2 (two) times daily. 5/10/2023: Take DOS      tolterodine (DETROL LA) 4 MG 24 hr capsule Take 1 capsule (4 mg total) by mouth once daily.  90 capsule 3    valACYclovir (VALTREX) 1000 MG tablet Take 1 tablet (1,000 mg total) by mouth 3 (three) times daily.  21 tablet 0    zolpidem (AMBIEN) 10 mg Tab TAKE ONE TABLET BY MOUTH AT BEDTIME AS NEEDED  30 tablet 0    [DISCONTINUED] gabapentin (NEURONTIN) 300 MG capsule Take 1 capsule by mouth 2 (two) times daily. 5/10/2023: Take DOS       No facility-administered encounter medications on file as of 12/11/2023.        Allergies:  Review of patient's allergies indicates:  No Known Allergies      Physical Exam       There were no vitals filed for this  "visit.      Physical Exam  Constitutional:       General: She is not in acute distress.     Appearance: Normal appearance. She is not ill-appearing.   Eyes:      General:         Right eye: No discharge.         Left eye: No discharge.      Extraocular Movements: Extraocular movements intact.      Pupils: Pupils are equal, round, and reactive to light.   Pulmonary:      Effort: Pulmonary effort is normal.   Skin:     Findings: Rash present. Rash is vesicular.          Neurological:      Mental Status: She is alert.          Laboratory:  CBC:  No results for input(s): "WBC", "RBC", "HGB", "HCT", "PLT", "MCV", "MCH", "MCHC" in the last 2160 hours.  CMP:  No results for input(s): "GLU", "CALCIUM", "ALBUMIN", "PROT", "NA", "K", "CO2", "CL", "BUN", "ALKPHOS", "ALT", "AST", "BILITOT" in the last 2160 hours.    Invalid input(s): "CREATININ"  URINALYSIS:  No results for input(s): "COLORU", "CLARITYU", "SPECGRAV", "PHUR", "PROTEINUA", "GLUCOSEU", "BILIRUBINCON", "BLOODU", "WBCU", "RBCU", "BACTERIA", "MUCUS", "NITRITE", "LEUKOCYTESUR", "UROBILINOGEN", "HYALINECASTS" in the last 2160 hours.   LIPIDS:  No results for input(s): "TSH", "HDL", "CHOL", "TRIG", "LDLCALC", "CHOLHDL", "NONHDLCHOL", "TOTALCHOLEST" in the last 2160 hours.  TSH:  No results for input(s): "TSH" in the last 2160 hours.  A1C:  No results for input(s): "HGBA1C" in the last 2160 hours.    Radiology:        Assessment:     Sharri Hill is a 58 y.o.female with:    Herpes zoster without complication  -     valACYclovir (VALTREX) 1000 MG tablet; Take 1 tablet (1,000 mg total) by mouth 3 (three) times daily.  Dispense: 21 tablet; Refill: 0  -     gabapentin (NEURONTIN) 100 MG capsule; Take 1 capsule (100 mg total) by mouth 3 (three) times daily.  Dispense: 90 capsule; Refill: 0          Plan:     Problem List Items Addressed This Visit          ID    Herpes zoster without complication - Primary    Overview     Valtrex 100 mg tid disp 21  Increase gabapentin 100 " mg to tid             As above, continue current medications and maintain follow up with specialists.  Return to clinic as scheduled      Frederick W Dantagnan [facility]                Answers submitted by the patient for this visit:  Rash Questionnaire (Submitted on 12/11/2023)  Chief Complaint: Rash  Chronicity: recurrent  Onset: yesterday  Progression since onset: unchanged  Affected locations: neck, left shoulder  Characteristics: pain, redness, swelling  Exposed to: nothing  anorexia: No  facial edema: No  fatigue: Yes  Treatments tried: analgesics, cold compress  Improvement on treatment: no relief  asthma: No  allergies: No  eczema: No  varicella: Yes

## 2023-12-13 DIAGNOSIS — N95.1 MENOPAUSAL STATE: ICD-10-CM

## 2023-12-18 ENCOUNTER — E-VISIT (OUTPATIENT)
Dept: PRIMARY CARE CLINIC | Facility: CLINIC | Age: 58
End: 2023-12-18
Payer: COMMERCIAL

## 2023-12-18 DIAGNOSIS — B02.9 HERPES ZOSTER WITHOUT COMPLICATION: Primary | ICD-10-CM

## 2023-12-18 PROCEDURE — 99421 OL DIG E/M SVC 5-10 MIN: CPT | Mod: ,,, | Performed by: INTERNAL MEDICINE

## 2023-12-18 PROCEDURE — 99421 PR E&M, ONLINE DIGIT, EST, < 7 DAYS, 5-10 MINS: ICD-10-PCS | Mod: ,,, | Performed by: INTERNAL MEDICINE

## 2023-12-18 RX ORDER — GABAPENTIN 300 MG/1
300 CAPSULE ORAL 3 TIMES DAILY
Qty: 90 CAPSULE | Refills: 0 | Status: SHIPPED | OUTPATIENT
Start: 2023-12-18 | End: 2024-12-17

## 2023-12-18 NOTE — PROGRESS NOTES
Patient ID: Sharri Hill is a 58 y.o. female.    Chief Complaint: Rash (Entered automatically based on patient selection in Patient Portal.)    The patient initiated a request through [employer] on 12/18/2023 for evaluation and management with a chief complaint of Rash (Entered automatically based on patient selection in Patient Portal.)     I evaluated the questionnaire submission on 12/18/2023.    [facility] Peq Evisit Rash    12/18/2023 10:07 AM CST - Filed by Patient   Do you agree to participate in an E-Visit? Yes   If you have any of the following symptoms, please present to your local ER or call 911:  I acknowledge   What is the main issue that you would like for your doctor to address today? Took all of medicine for the ahingles but atill in pain in my neck and still can turn my neck and i worried i have nerve damage from the shingles whats my next step to feeling better?   Are you able to take your vital signs? Yes   Systolic Blood Pressure: 120   Diastolic Blood Pressure: 84   Weight: 166   Height: 69   Pulse: 70   Temperature: 96   Respiration rate: 22   Pulse Oxygen: 99   How would you describe your skin problem? Rash   When did your symptoms first appear? 12/29/2023   Where is it located?  Neck   Does it itch? No   Does it hurt? Yes   Where is the pain located? Where the skin change is noted   The pain came on: Suddenly   The pain has the character of: Sharp   Frequency of the pain (How often does it appear)? Daily   Please select the face that most closely captures your pain level: 8   Is there discharge or drainage? No   Is there bleeding? No   Describe the character Spots   Describe the color Red   Has it changed over time? Shrunk in size   Frequency of skin problem Fluctuates at random   Duration of the skin problem (how long does it stay when it is present) Weeks   I have had a new exposure to No new exposures   I have had a new exposure to No new exposures   What have you used to treat the skin problem?  Gabapentin and valtrex   If you have used anything for treatment, has it helped the symptoms? Maybe   Other generalized symptoms that you associate with the rash Headache;  Muscle ache   Provide any information you feel is important to your history not asked above The rash is almost gone but neck pain still there   At least one photo is required for treatment to be provided. You can upload a maximum of three photos of the affected area.           Recent Labs Obtained:  No visits with results within 7 Day(s) from this visit.   Latest known visit with results is:   Hospital Outpatient Visit on 10/31/2023   Component Date Value Ref Range Status    Ascending aorta 10/31/2023 3.40  cm Final    STJ 10/31/2023 3.05  cm Final    IVS 10/31/2023 0.60  0.6 - 1.1 cm Final    LA size 10/31/2023 3.41  cm Final    Left Atrium Major Axis 10/31/2023 4.51  cm Final    Left Atrium Minor Axis 10/31/2023 4.38  cm Final    LVIDd 10/31/2023 4.36  3.5 - 6.0 cm Final    LVIDs 10/31/2023 2.87  2.1 - 4.0 cm Final    LVOT diameter 10/31/2023 2.13  cm Final    LVOT peak VTI 10/31/2023 28.71  cm Final    Posterior Wall 10/31/2023 0.71  0.6 - 1.1 cm Final    MV Peak A Alessio 10/31/2023 0.50  m/s Final    E wave deceleration time 10/31/2023 243.33  msec Final    MV Peak E Alessio 10/31/2023 1.14  m/s Final    PV Peak D Alessio 10/31/2023 0.65  m/s Final    PV Peak S Alessio 10/31/2023 0.82  m/s Final    RA Major Waldron 10/31/2023 4.09  cm Final    RA Width 10/31/2023 2.80  cm Final    RVDD 10/31/2023 2.73  cm Final    Sinus 10/31/2023 2.87  cm Final    TAPSE 10/31/2023 2.16  cm Final    TR Max Alessio 10/31/2023 2.61  m/s Final    LA WIDTH 10/31/2023 3.53  cm Final    MV stenosis pressure 1/2 time 10/31/2023 70.57  ms Final    LV Diastolic Volume 10/31/2023 85.62  mL Final    LV Systolic Volume 10/31/2023 31.43  mL Final    LVOT peak alessio 10/31/2023 1.24  m/s Final    TDI LATERAL 10/31/2023 0.11  m/s Final    TDI SEPTAL 10/31/2023 0.08  m/s Final    LA volume (mod)  "10/31/2023 38.31  cm3 Final    MV "A" wave duration 10/31/2023 21.69  msec Final    LV LATERAL E/E' RATIO 10/31/2023 10.36  m/s Final    LV SEPTAL E/E' RATIO 10/31/2023 14.25  m/s Final    FS 10/31/2023 34  % Final    LA volume 10/31/2023 45.47  cm3 Final    LV mass 10/31/2023 83.30  g Final    Left Ventricle Relative Wall Thick* 10/31/2023 0.33  cm Final    MV valve area p 1/2 method 10/31/2023 3.12  cm2 Final    E/A ratio 10/31/2023 2.28   Final    Mean e' 10/31/2023 0.10  m/s Final    Pulm vein S/D ratio 10/31/2023 1.26   Final    LVOT area 10/31/2023 3.6  cm2 Final    LVOT stroke volume 10/31/2023 102.25  cm3 Final    E/E' ratio 10/31/2023 12.00  m/s Final    Triscuspid Valve Regurgitation Pea* 10/31/2023 27  mmHg Final    85% Max Predicted HR 10/31/2023 137   Final    Max Predicted HR 10/31/2023 161   Final    OHS CV CPX PATIENT IS MALE 10/31/2023 0.0   Final    [facility]HS CV CPX PATIENT IS FEMALE 10/31/2023 1.0   Final    Systolic blood pressure 10/31/2023 139  mmHg Final    Diastolic blood pressure 10/31/2023 79  mmHg Final    HR at rest 10/31/2023 63  bpm Final    Exercise duration (min) 10/31/2023 6  minutes Final    Exercise duration (sec) 10/31/2023 8  seconds Final    Peak Systolic BP 10/31/2023 207  mmHg Final    Peak Diatolic BP 10/31/2023 93  mmHg Final    Peak HR 10/31/2023 150  bpm Final    Estimated METs 10/31/2023 8   Final    % Max HR Achieved 10/31/2023 97   Final    1 Minute Recovery HR 10/31/2023 122  bpm Final    RPP 10/31/2023 8,757   Final    Peak RPP 10/31/2023 31,050   Final    TV resting pulmonary artery pressu* 10/31/2023 30  mmHg Final    RV TB RVSP 10/31/2023 6  mmHg Final    Est. RA pres 10/31/2023 3  mmHg Final       Encounter Diagnosis   Name Primary?    Herpes zoster without complication Yes        No orders of the defined types were placed in this encounter.     Medications Ordered This Encounter   Medications    gabapentin (NEURONTIN) 300 MG capsule     Sig: Take 1 capsule (300 mg " total) by mouth 3 (three) times daily.     Dispense:  90 capsule     Refill:  0        No follow-ups on file.      E-Visit Time Tracking:    Day 1 Time (in minutes): 5     Total Time (in minutes): 5

## 2024-01-10 DIAGNOSIS — G47.09 OTHER INSOMNIA: ICD-10-CM

## 2024-01-10 DIAGNOSIS — M17.12 PRIMARY OSTEOARTHRITIS OF LEFT KNEE: ICD-10-CM

## 2024-01-10 RX ORDER — NABUMETONE 750 MG/1
750 TABLET, FILM COATED ORAL DAILY
Qty: 30 TABLET | Refills: 1 | Status: SHIPPED | OUTPATIENT
Start: 2024-01-10

## 2024-01-10 RX ORDER — ZOLPIDEM TARTRATE 10 MG/1
10 TABLET ORAL NIGHTLY
Qty: 30 TABLET | Refills: 0 | Status: SHIPPED | OUTPATIENT
Start: 2024-01-10 | End: 2024-02-08

## 2024-01-10 RX ORDER — ZOLPIDEM TARTRATE 10 MG/1
10 TABLET ORAL NIGHTLY
Qty: 30 TABLET | Refills: 0 | OUTPATIENT
Start: 2024-01-10

## 2024-01-10 NOTE — TELEPHONE ENCOUNTER
Care Due:                  Date            Visit Type   Department     Provider  --------------------------------------------------------------------------------                                ESTABLISHED                              PATIENT -    [facility]  Last Visit: 12-      [facility]      CARE           David Bose  Next Visit: None Scheduled  None         None Found                                                            Last  Test          Frequency    Reason                     Performed    Due Date  --------------------------------------------------------------------------------    Cr..........  12 months..  nabumetone, valACYclovir.  04- 04-    [facility] Embedded Care Due Messages. Reference number: [id number].   1/10/2024 10:39:54 AM CST

## 2024-01-10 NOTE — TELEPHONE ENCOUNTER
No care due was identified.  [facility] Embedded Care Due Messages. Reference number: [id number].   1/10/2024 11:54:47 AM CST

## 2024-02-05 DIAGNOSIS — N95.1 MENOPAUSAL STATE: ICD-10-CM

## 2024-02-07 DIAGNOSIS — N95.1 MENOPAUSAL STATE: ICD-10-CM

## 2024-02-07 DIAGNOSIS — G47.09 OTHER INSOMNIA: ICD-10-CM

## 2024-02-07 NOTE — TELEPHONE ENCOUNTER
No care due was identified.  Health [facility] Embedded Care Due Messages. Reference number: [id number].   2/07/2024 10:45:17 AM CST

## 2024-02-08 RX ORDER — ZOLPIDEM TARTRATE 10 MG/1
10 TABLET ORAL NIGHTLY
Qty: 30 TABLET | Refills: 0 | Status: SHIPPED | OUTPATIENT
Start: 2024-02-08 | End: 2024-03-15

## 2024-02-12 DIAGNOSIS — N95.1 SYMPTOMATIC MENOPAUSAL OR FEMALE CLIMACTERIC STATES: ICD-10-CM

## 2024-02-12 RX ORDER — PROGESTERONE 100 MG/1
CAPSULE ORAL
Qty: 90 CAPSULE | Refills: 2 | Status: SHIPPED | OUTPATIENT
Start: 2024-02-12

## 2024-02-12 NOTE — TELEPHONE ENCOUNTER
Refill Decision Note   Sharri Hill  is requesting a refill authorization.  Brief Assessment and Rationale for Refill:  Approve     Medication Therapy Plan:         Comments:     Note composed:11:19 AM 02/12/2024

## 2024-02-19 RX ORDER — ESTRADIOL 0.25 MG/.25G
1 GEL TOPICAL DAILY
Qty: 90 PACKET | Refills: 1 | Status: SHIPPED | OUTPATIENT
Start: 2024-02-19 | End: 2024-05-03

## 2024-02-26 RX ORDER — ESTRADIOL 0.25 MG/.25G
GEL TOPICAL
Qty: 90 PACKET | Refills: 3 | Status: SHIPPED | OUTPATIENT
Start: 2024-02-26 | End: 2024-05-06 | Stop reason: DRUGHIGH

## 2024-03-15 DIAGNOSIS — M17.12 PRIMARY OSTEOARTHRITIS OF LEFT KNEE: ICD-10-CM

## 2024-03-15 DIAGNOSIS — G47.09 OTHER INSOMNIA: ICD-10-CM

## 2024-03-15 RX ORDER — ZOLPIDEM TARTRATE 10 MG/1
10 TABLET ORAL NIGHTLY
Qty: 30 TABLET | Refills: 0 | Status: SHIPPED | OUTPATIENT
Start: 2024-03-15 | End: 2024-04-11

## 2024-03-15 RX ORDER — NABUMETONE 750 MG/1
750 TABLET, FILM COATED ORAL
Qty: 30 TABLET | Refills: 1 | Status: SHIPPED | OUTPATIENT
Start: 2024-03-15 | End: 2024-05-10 | Stop reason: SDUPTHER

## 2024-04-10 DIAGNOSIS — G47.09 OTHER INSOMNIA: ICD-10-CM

## 2024-04-10 NOTE — TELEPHONE ENCOUNTER
Care Due:                  Date            Visit Type   Department     Provider  --------------------------------------------------------------------------------                                ESTABLISHED                              PATIENT -    [facility]  Last Visit: 12-      [facility]           David MCRAE                              FOLLOWUP/OF  [facility]  Next Visit: 05-      [facility] V[facility]IT   CARE           David Bose                                                            Last  Test          Frequency    Reason                     Performed    Due Date  --------------------------------------------------------------------------------    CBC.........  12 months..  nabumetone, valACYclovir.  Not Found    Overdue    [employer] Embedded Care Due Messages. Reference number: [id number].   4/10/2024 3:21:40 PM CDT

## 2024-04-11 RX ORDER — ZOLPIDEM TARTRATE 10 MG/1
10 TABLET ORAL NIGHTLY
Qty: 30 TABLET | Refills: 0 | Status: SHIPPED | OUTPATIENT
Start: 2024-04-11 | End: 2024-05-13

## 2024-05-03 ENCOUNTER — OFFICE VISIT (OUTPATIENT)
Dept: PRIMARY CARE CLINIC | Facility: CLINIC | Age: 59
End: 2024-05-03
Payer: COMMERCIAL

## 2024-05-03 VITALS
OXYGEN SATURATION: 98 % | HEIGHT: 68 IN | HEART RATE: 85 BPM | BODY MASS INDEX: 23.89 KG/M2 | DIASTOLIC BLOOD PRESSURE: 70 MMHG | WEIGHT: 157.63 LBS | TEMPERATURE: 97 F | SYSTOLIC BLOOD PRESSURE: 120 MMHG | RESPIRATION RATE: 18 BRPM

## 2024-05-03 DIAGNOSIS — I10 BENIGN ESSENTIAL HYPERTENSION: ICD-10-CM

## 2024-05-03 DIAGNOSIS — Z00.00 ANNUAL PHYSICAL EXAM: Primary | ICD-10-CM

## 2024-05-03 PROCEDURE — 99999 PR PBB SHADOW E&M-EST. PATIENT-LVL V: CPT | Mod: PBBFAC,,, | Performed by: INTERNAL MEDICINE

## 2024-05-03 PROCEDURE — 99396 PREV VISIT EST AGE 40-64: CPT | Mod: S$GLB,,, | Performed by: INTERNAL MEDICINE

## 2024-05-03 NOTE — PROGRESS NOTES
[facility] Note    Chief Complaint      Chief Complaint   Patient presents with    Annual Exam       History of Present Illness      Sharri Hill is a 58 y.o. female who presents today for   Chief Complaint   Patient presents with    Annual Exam   .  Patient comes to appointment here for annual preventative visits rayshawn Grant e needs full screening labs with this visit . She is complaint with al meds . She is stable on her meds ofr insomnia . She is complaining of extreme fatigue     Problem List Items Addressed This Visit          Cardiac/Vascular    Benign essential hypertension    Overview     Is on losartan 25 mg and hctz 25 mg bp well controlled             Other    Annual physical exam - Primary    Overview     pe documented needs full screening labs will order               Past Medical History:  Past Medical History:   Diagnosis Date    Abnormal Pap smear of cervix     Cryo Done ([place])     Anxiety     Esophageal reflux     History of shingles ,     Right arm   and  Left hip     Menopause     Migraine     Urge incontinence        Past Surgical History:  Past Surgical History:   Procedure Laterality Date    APPENDECTOMY       SECTION      COLONOSCOPY  [id number]    Polyp in the transverse. Repeat in 5 years    COSMETIC SURGERY      Nose    HYSTEROSCOPY WITH DILATION AND CURETTAGE OF UTERUS N/A 2023    Procedure: HYSTEROSCOPY, WITH DILATION AND CURETTAGE OF UTERUS;  Surgeon: Karoline Hunt MD;  Location: [facility] OR;  Service: OB/GYN;  Laterality: N/A;    KNEE SURGERY         Family History:  family history includes Breast cancer in her maternal aunt; Coronary artery disease (age of onset: 60) in her father; Depression in her mother; Diabetes in her maternal grandfather and maternal grandmother; Heart disease in her father; Hyperlipidemia in her father; Hypertension in her father; Kidney disease in her sister.    Social  History:  Social History     Socioeconomic History    Marital status:    Tobacco Use    Smoking status: Never    Smokeless tobacco: Never   Substance and Sexual Activity    Alcohol use: Yes     Alcohol/week: 6.0 standard drinks of alcohol     Types: 6 Glasses of wine per week     Comment: Something more    Drug use: No    Sexual activity: Yes     Partners: Male     Birth control/protection: Partner-Vasectomy, Post-menopausal     Comment:  , together since 40 years       Review of Systems:   Review of Systems   Constitutional:  Negative for fever and weight loss.   HENT:  Negative for congestion, hearing loss and sore throat.    Eyes:  Negative for blurred vision.   Respiratory:  Negative for cough and shortness of breath.    Cardiovascular:  Negative for chest pain, palpitations, claudication and leg swelling.   Gastrointestinal:  Negative for abdominal pain, constipation, diarrhea and heartburn.   Genitourinary:  Negative for dysuria.   Musculoskeletal:  Negative for back pain and myalgias.   Skin:  Negative for rash.   Neurological:  Negative for focal weakness and headaches.   Psychiatric/Behavioral:  Negative for depression and suicidal ideas. The patient is not nervous/anxious.          Medications:  Outpatient Encounter Medications as of 5/3/2024   Medication Sig Note Dispense Refill    butalbital-acetaminophen-caff -40 mg Cap  5/10/2023: Hold DOS      EScitalopram oxalate (LEXAPRO) 20 MG tablet TAKE 1 TABLET DAILY  90 tablet 3    estradioL (DIVIGEL) 0.25 mg/0.25 gram (0.1 %) GlPk APPLY 1 PACKET ONTO THE SKIN DAILY  90 packet 3    estradioL (ESTRACE) 0.01 % (0.1 mg/gram) vaginal cream Place 1 g vaginally 3 (three) times a week.  42.5 g 3    gabapentin (NEURONTIN) 300 MG capsule Take 1 capsule (300 mg total) by mouth 3 (three) times daily. (Patient taking differently: Take 600 mg by mouth once daily.)  90 capsule 0    hydroCHLOROthiazide (HYDRODIURIL) 25 MG tablet Take 1 tablet (25 mg total)  by mouth once daily. (Patient taking differently: Take 25 mg by mouth 2 (two) times daily.)  90 tablet 3    HYDROcodone-acetaminophen (NORCO) 7.5-325 mg per tablet Take 1 tablet by mouth 2 (two) times daily as needed. 5/10/2023: May take DOS      losartan (COZAAR) 100 MG tablet Take 1 tablet (100 mg total) by mouth once daily.  90 tablet 3    nabumetone (RELAFEN) 750 MG tablet TAKE ONE TABLET BY MOUTH EVERY DAY  30 tablet 1    progesterone (PROMETRIUM) 100 MG capsule TAKE 1 CAPSULE 30 TO 60 MINUTES BEFORE BED EVERY NIGHT  90 capsule 2    promethazine (PHENERGAN) 25 MG tablet Take 25-50 mg by mouth every 8 (eight) hours as needed.       PROTONIX 40 mg tablet Take 40 mg by mouth every morning. 5/10/2023: Take DOS      tirzepatide (MOUNJARO SUBQ) Inject into the skin.       tiZANidine (ZANAFLEX) 4 MG tablet Take 4 mg by mouth 2 (two) times daily. 5/10/2023: Take DOS      tolterodine (DETROL LA) 4 MG 24 hr capsule Take 1 capsule (4 mg total) by mouth once daily.  90 capsule 3    valACYclovir (VALTREX) 1000 MG tablet Take 1 tablet (1,000 mg total) by mouth 3 (three) times daily.  21 tablet 0    zolpidem (AMBIEN) 10 mg Tab TAKE ONE TABLET BY MOUTH AT BEDTIME  30 tablet 0    [DISCONTINUED] estradioL (DIVIGEL) 0.25 mg/0.25 gram (0.1 %) GlPk Apply 1 packet topically Daily. (Patient not taking: Reported on 5/3/2024)  90 packet 1    [DISCONTINUED] estradioL (DIVIGEL) 0.25 mg/0.25 gram (0.1 %) GlPk Place 1 packet onto the skin Daily. (Patient not taking: Reported on 5/3/2024)  90 packet 1    [DISCONTINUED] zolpidem (AMBIEN) 10 mg Tab TAKE ONE TABLET BY MOUTH AT BEDTIME  30 tablet 0     No facility-administered encounter medications on file as of 5/3/2024.        Allergies:  Review of patient's allergies indicates:  No Known Allergies      Physical Exam         Vitals:    05/03/24 1045   BP: 120/70   Pulse: 85   Resp: 18   Temp: 97 °F (36.1 °C)         Physical Exam  Constitutional:       Appearance: Normal appearance. She is  "well-developed.   Eyes:      Pupils: Pupils are equal, round, and reactive to light.   Neck:      Thyroid: No thyromegaly.   Cardiovascular:      Rate and Rhythm: Normal rate.      Heart sounds: Normal heart sounds. No murmur heard.     No friction rub. No gallop.   Pulmonary:      Breath sounds: Normal breath sounds.   Abdominal:      General: Bowel sounds are normal.      Palpations: Abdomen is soft.   Musculoskeletal:         General: Normal range of motion.      Cervical back: Normal range of motion.   Lymphadenopathy:      Cervical: No cervical adenopathy.   Skin:     General: Skin is warm.      Findings: No rash.   Neurological:      Mental Status: She is alert and oriented to person, place, and time.      Cranial Nerves: No cranial nerve deficit.   Psychiatric:         Behavior: Behavior normal.          Laboratory:  CBC:  No results for input(s): "WBC", "RBC", "HGB", "HCT", "PLT", "MCV", "MCH", "MCHC" in the last 2160 hours.  CMP:  No results for input(s): "GLU", "CALCIUM", "ALBUMIN", "PROT", "NA", "K", "CO2", "CL", "BUN", "ALKPHOS", "ALT", "AST", "BILITOT" in the last 2160 hours.    Invalid input(s): "CREATININ"  URINALYSIS:  No results for input(s): "COLORU", "CLARITYU", "SPECGRAV", "PHUR", "PROTEINUA", "GLUCOSEU", "BILIRUBINCON", "BLOODU", "WBCU", "RBCU", "BACTERIA", "MUCUS", "NITRITE", "LEUKOCYTESUR", "UROBILINOGEN", "HYALINECASTS" in the last 2160 hours.   LIPIDS:  No results for input(s): "TSH", "HDL", "CHOL", "TRIG", "LDLCALC", "CHOLHDL", "NONHDLCHOL", "TOTALCHOLEST" in the last 2160 hours.  TSH:  No results for input(s): "TSH" in the last 2160 hours.  A1C:  No results for input(s): "HGBA1C" in the last 2160 hours.    Radiology:        Assessment:     Sharri Hill is a 58 y.o.female with:    Annual physical exam  -     CBC Auto Differential; Future; Expected date: 05/03/2024  -     Comprehensive Metabolic Panel; Future; Expected date: 05/03/2024  -     Lipid Panel; Future; Expected date: " 05/03/2024  -     TSH; Future; Expected date: 05/03/2024    Benign essential hypertension          Plan:     Problem List Items Addressed This Visit          Cardiac/Vascular    Benign essential hypertension    Overview     Is on losartan 25 mg and hctz 25 mg bp well controlled             Other    Annual physical exam - Primary    Overview     pe documented needs full screening labs will order             As above, continue current medications and maintain follow up with specialists.  Return to clinic in 6 months.      Frederick W Dantagnan [facility]

## 2024-05-06 ENCOUNTER — OFFICE VISIT (OUTPATIENT)
Dept: OBSTETRICS AND GYNECOLOGY | Facility: CLINIC | Age: 59
End: 2024-05-06
Payer: COMMERCIAL

## 2024-05-06 VITALS
BODY MASS INDEX: 23.89 KG/M2 | HEIGHT: 68 IN | WEIGHT: 157.63 LBS | SYSTOLIC BLOOD PRESSURE: 105 MMHG | HEART RATE: 76 BPM | DIASTOLIC BLOOD PRESSURE: 64 MMHG

## 2024-05-06 DIAGNOSIS — Z79.890 POSTMENOPAUSAL HORMONE REPLACEMENT THERAPY: Primary | ICD-10-CM

## 2024-05-06 DIAGNOSIS — N39.46 MIXED INCONTINENCE URGE AND STRESS: ICD-10-CM

## 2024-05-06 DIAGNOSIS — N94.10 DYSPAREUNIA IN FEMALE: ICD-10-CM

## 2024-05-06 PROCEDURE — 99214 OFFICE O/P EST MOD 30 MIN: CPT | Mod: S$GLB,,, | Performed by: OBSTETRICS & GYNECOLOGY

## 2024-05-06 PROCEDURE — 99999 PR PBB SHADOW E&M-EST. PATIENT-LVL III: CPT | Mod: PBBFAC,,, | Performed by: OBSTETRICS & GYNECOLOGY

## 2024-05-06 RX ORDER — PROGESTERONE 200 MG/1
200 CAPSULE ORAL NIGHTLY
Qty: 90 CAPSULE | Refills: 1 | Status: SHIPPED | OUTPATIENT
Start: 2024-05-06

## 2024-05-06 RX ORDER — SCOLOPAMINE TRANSDERMAL SYSTEM 1 MG/1
1 PATCH, EXTENDED RELEASE TRANSDERMAL
COMMUNITY
Start: 2024-01-11

## 2024-05-06 RX ORDER — ONDANSETRON 4 MG/1
TABLET, ORALLY DISINTEGRATING ORAL
COMMUNITY
Start: 2024-04-23

## 2024-05-06 RX ORDER — GABAPENTIN 600 MG/1
TABLET ORAL DAILY
COMMUNITY
Start: 2024-03-14

## 2024-05-06 RX ORDER — RIMEGEPANT SULFATE 75 MG/75MG
75 TABLET, ORALLY DISINTEGRATING ORAL DAILY PRN
COMMUNITY
Start: 2024-03-14

## 2024-05-06 RX ORDER — ESTRADIOL 0.1 MG/G
1 CREAM VAGINAL DAILY
Qty: 42.5 G | Refills: 4 | Status: SHIPPED | OUTPATIENT
Start: 2024-05-06

## 2024-05-06 RX ORDER — ESTRADIOL 0.5 MG/.5G
1 GEL TOPICAL DAILY
Qty: 90 PACKET | Refills: 1 | Status: SHIPPED | OUTPATIENT
Start: 2024-05-06 | End: 2025-05-06

## 2024-05-06 NOTE — PROGRESS NOTES
Sharri Hill is a 58 y.o. female who is here for follow-up of hormone replacement therapy.  Prior patient of Dr. Hudson, first seen in July 2022 for management of hot flashes, moodiness, vaginal dryness, anxiety, decreased libido (mainly due to dyspareunia), insomnia, hair loss, palpitations, and joint pain. She was placed on Estrace 1mg and prometrium 100mg.   First episode of bleeding was within first three months of use with mostly spotting and some heavier bleeding like a light cycle day . She states she stopped the Estradiol within a few weeks after beginning the bleeding. An attempt at EMB was made by Urogyn twice with pretreatment with CYtotec, but unable to be completed due to cervical stenosis. Ultrasound showed a thin Endometrial stripe, with a possible small endocervical polyp. Primary concern is Pain with sex, which has improved with use of the Lidocain,2% Diazepam 5, Baclo 4%suppositories given by Urogyn to treat this.      An additional concern is her recent elevated Blood Pressure which seems to have begun a few months ago, states it had never been noted prior to beginning her HRT, and she is seeing Cardiology for this as well as for the palpitations she has been having, although the palpitations began prior to starting HRT.     At her last visit on 10-, she said     had a Hysteroscopy with D&C May 11,2023 by Dr. Yañez which showed:   ENDOMETRIUM, CURETTAGE:   - No endometrial tissue present for evaluation.   - Squamous ectocervical mucosa with features suggestive of low- grade squamous intraepithelial lesion/cervical intraepithelial neoplasia 1 (ESTELLA 1).   Repeat Pap May 25: Normal and Negative for HR HPV     She has also undergone evaluation with Cardiology for the HTN and Palpitations which has been negative.     PLAN on 10-:  Continue:     Divigel 0.25 and Prometrium 100mg     Consider low dose testosterone( for beneficial side effect of more energy) , but with concern about  possible worsening of hair loss.      Advised to schedule for Psych counseling based on depressive symptoms for possible medication change       She is currently taking Divigel 0.25 and Prometrium 100mg HS.  The patient states the following symptoms have improved:  she feels better since losing weight on Mounjaro. (This has also resolved her loose stools which were causing so much of her fatigue.  Her thinning head hair has gradually improved. (Has started Nutrafol ) . .      Her main concern today is Continued low libido.  She had the following side effects: none.  Patient denies post-menopausal vaginal bleeding. The patient is sexually active.     No visits with results within 3 Month(s) from this visit.   Latest known visit with results is:   Hospital Outpatient Visit on 10/31/2023   Component Date Value Ref Range Status    Ascending aorta 10/31/2023 3.40  cm Final    STJ 10/31/2023 3.05  cm Final    IVS 10/31/2023 0.60  0.6 - 1.1 cm Final    LA size 10/31/2023 3.41  cm Final    Left Atrium Major Axis 10/31/2023 4.51  cm Final    Left Atrium Minor Axis 10/31/2023 4.38  cm Final    LVIDd 10/31/2023 4.36  3.5 - 6.0 cm Final    LVIDs 10/31/2023 2.87  2.1 - 4.0 cm Final    LVOT diameter 10/31/2023 2.13  cm Final    LVOT peak VTI 10/31/2023 28.71  cm Final    Posterior Wall 10/31/2023 0.71  0.6 - 1.1 cm Final    MV Peak A Alessio 10/31/2023 0.50  m/s Final    E wave deceleration time 10/31/2023 243.33  msec Final    MV Peak E Alessio 10/31/2023 1.14  m/s Final    PV Peak D Alessio 10/31/2023 0.65  m/s Final    PV Peak S Alessio 10/31/2023 0.82  m/s Final    RA Major [place] 10/31/2023 4.09  cm Final    RA Width 10/31/2023 2.80  cm Final    RVDD 10/31/2023 2.73  cm Final    Sinus 10/31/2023 2.87  cm Final    TAPSE 10/31/2023 2.16  cm Final    TR Max Alessio 10/31/2023 2.61  m/s Final    LA WIDTH 10/31/2023 3.53  cm Final    MV stenosis pressure 1/2 time 10/31/2023 70.57  ms Final    LV Diastolic Volume 10/31/2023 85.62  mL Final    LV  "Systolic Volume 10/31/2023 31.43  mL Final    LVOT peak adrián 10/31/2023 1.24  m/s Final    TDI LATERAL 10/31/2023 0.11  m/s Final    TDI SEPTAL 10/31/2023 0.08  m/s Final    LA volume (mod) 10/31/2023 38.31  cm3 Final    MV "A" wave duration 10/31/2023 21.69  msec Final    LV LATERAL E/E' RATIO 10/31/2023 10.36  m/s Final    LV SEPTAL E/E' RATIO 10/31/2023 14.25  m/s Final    FS 10/31/2023 34  % Final    LA volume 10/31/2023 45.47  cm3 Final    LV mass 10/31/2023 83.30  g Final    Left Ventricle Relative Wall Thick* 10/31/2023 0.33  cm Final    MV valve area p 1/2 method 10/31/2023 3.12  cm2 Final    E/A ratio 10/31/2023 2.28   Final    Mean e' 10/31/2023 0.10  m/s Final    Pulm vein S/D ratio 10/31/2023 1.26   Final    LVOT area 10/31/2023 3.6  cm2 Final    LVOT stroke volume 10/31/2023 102.25  cm3 Final    E/E' ratio 10/31/2023 12.00  m/s Final    Triscuspid Valve Regurgitation Pea* 10/31/2023 27  mmHg Final    85% Max Predicted HR 10/31/2023 137   Final    Max Predicted HR 10/31/2023 161   Final    OHS CV CPX PATIENT IS MALE 10/31/2023 0.0   Final    OHS CV CPX PATIENT IS FEMALE 10/31/2023 1.0   Final    Systolic blood pressure 10/31/2023 139  mmHg Final    Diastolic blood pressure 10/31/2023 79  mmHg Final    HR at rest 10/31/2023 63  bpm Final    Exercise duration (min) 10/31/2023 6  minutes Final    Exercise duration (sec) 10/31/2023 8  seconds Final    Peak Systolic BP 10/31/2023 207  mmHg Final    Peak Diatolic BP 10/31/2023 93  mmHg Final    Peak HR 10/31/2023 150  bpm Final    Estimated METs 10/31/2023 8   Final    % Max HR Achieved 10/31/2023 97   Final    1 Minute Recovery HR 10/31/2023 122  bpm Final    RPP 10/31/2023 8,757   Final    Peak RPP 10/31/2023 31,050   Final    TV resting pulmonary artery pressu* 10/31/2023 30  mmHg Final    RV TB RVSP 10/31/2023 6  mmHg Final    Est. RA pres 10/31/2023 3  mmHg Final       Past Medical History:   Diagnosis Date    Abnormal Pap smear of cervix 1992    Cryo Done " ([place])     Anxiety     Esophageal reflux     History of shingles ,     Right arm   and  Left hip     Menopause 2018    Migraine     Urge incontinence      Past Surgical History:   Procedure Laterality Date    APPENDECTOMY       SECTION      COLONOSCOPY  [id number]    Polyp in the transverse. Repeat in 5 years    COSMETIC SURGERY      Nose    HYSTEROSCOPY WITH DILATION AND CURETTAGE OF UTERUS N/A 2023    Procedure: HYSTEROSCOPY, WITH DILATION AND CURETTAGE OF UTERUS;  Surgeon: Karoline Hunt MD;  Location: [facility] OR;  Service: OB/GYN;  Laterality: N/A;    KNEE SURGERY       Social History     Tobacco Use    Smoking status: Never    Smokeless tobacco: Never   Substance Use Topics    Alcohol use: Yes     Alcohol/week: 6.0 standard drinks of alcohol     Types: 6 Glasses of wine per week     Comment: Something more    Drug use: No     Family History   Problem Relation Name Age of Onset    Diabetes Maternal Grandmother [place]     Diabetes Maternal Grandfather      Hypertension Father Venkatesh Gutiérrezalais     Hyperlipidemia Father Venkatesh Gutiérrezalais     Coronary artery disease Father Riddle Rosy 60    Heart disease Father Riddle Rosy     Depression Mother Concepción Gallegos     Kidney disease Sister Liz Gallegos     Breast cancer Maternal Aunt           at age 40    Colon cancer Neg Hx      Ovarian cancer Neg Hx      Cancer Neg Hx      Stroke Neg Hx       OB History    Para Term  AB Living   1 1 1     1   SAB IAB Ectopic Multiple Live Births           1      # Outcome Date GA Lbr Dylan/2nd Weight Sex Type Anes PTL Lv   1 Term 95 40w0d  2.495 kg (5 lb 8 oz) F CS-Unspec   EDYTA       Current Outpatient Medications:     butalbital-acetaminophen-caff -40 mg Cap, , Disp: , Rfl:     EScitalopram oxalate (LEXAPRO) 20 MG tablet, TAKE 1 TABLET DAILY, Disp: 90 tablet, Rfl: 3    gabapentin (NEURONTIN) 600 MG tablet, Take by mouth once daily., Disp: , Rfl:      hydroCHLOROthiazide (HYDRODIURIL) 25 MG tablet, Take 1 tablet (25 mg total) by mouth once daily. (Patient taking differently: Take 25 mg by mouth 2 (two) times daily.), Disp: 90 tablet, Rfl: 3    HYDROcodone-acetaminophen (NORCO) 7.5-325 mg per tablet, Take 1 tablet by mouth 2 (two) times daily as needed., Disp: , Rfl:     losartan (COZAAR) 100 MG tablet, Take 1 tablet (100 mg total) by mouth once daily., Disp: 90 tablet, Rfl: 3    nabumetone (RELAFEN) 750 MG tablet, TAKE ONE TABLET BY MOUTH EVERY DAY, Disp: 30 tablet, Rfl: 1    NURTEC 75 mg odt, Take 75 mg by mouth daily as needed., Disp: , Rfl:     ondansetron (ZOFRAN-ODT) 4 MG TbDL, , Disp: , Rfl:     progesterone (PROMETRIUM) 100 MG capsule, TAKE 1 CAPSULE 30 TO 60 MINUTES BEFORE BED EVERY NIGHT, Disp: 90 capsule, Rfl: 2    promethazine (PHENERGAN) 25 MG tablet, Take 25-50 mg by mouth every 8 (eight) hours as needed., Disp: , Rfl:     PROTONIX 40 mg tablet, Take 40 mg by mouth every morning., Disp: , Rfl:     scopolamine (TRANSDERM-SCOP) 1.3-1.5 mg (1 mg over 3 days), 1 patch Every 3 (three) days., Disp: , Rfl:     tirzepatide (MOUNJARO SUBQ), Inject into the skin., Disp: , Rfl:     tiZANidine (ZANAFLEX) 4 MG tablet, Take 4 mg by mouth 2 (two) times daily., Disp: , Rfl:     tolterodine (DETROL LA) 4 MG 24 hr capsule, Take 1 capsule (4 mg total) by mouth once daily., Disp: 90 capsule, Rfl: 3    valACYclovir (VALTREX) 1000 MG tablet, Take 1 tablet (1,000 mg total) by mouth 3 (three) times daily., Disp: 21 tablet, Rfl: 0    zolpidem (AMBIEN) 10 mg Tab, TAKE ONE TABLET BY MOUTH AT BEDTIME, Disp: 30 tablet, Rfl: 0    estradioL (DIVIGEL) 0.5 mg/0.5 gram (0.1 %) GlPk, Place 1 application  onto the skin once daily., Disp: 90 packet, Rfl: 1    estradioL (ESTRACE) 0.01 % (0.1 mg/gram) vaginal cream, Place 1 g vaginally once daily., Disp: 42.5 g, Rfl: 4    progesterone (PROMETRIUM) 200 MG capsule, Take 1 capsule (200 mg total) by mouth nightly., Disp: 90 capsule, Rfl:  "1    Vitals:    05/06/24 1453   BP: 105/64   Pulse: 76   Weight: 71.5 kg (157 lb 10.1 oz)   Height: 5' 8" (1.727 m)   PainSc: 0-No pain     Body mass index is 23.97 kg/m².       Assessment:    Postmenopausal hormone replacement therapy  -     estradioL (DIVIGEL) 0.5 mg/0.5 gram (0.1 %) GlPk; Place 1 application  onto the skin once daily.  Dispense: 90 packet; Refill: 1  -     progesterone (PROMETRIUM) 200 MG capsule; Take 1 capsule (200 mg total) by mouth nightly.  Dispense: 90 capsule; Refill: 1    Mixed incontinence urge and stress  -     estradioL (ESTRACE) 0.01 % (0.1 mg/gram) vaginal cream; Place 1 g vaginally once daily.  Dispense: 42.5 g; Refill: 4    Dyspareunia in female  -     estradioL (ESTRACE) 0.01 % (0.1 mg/gram) vaginal cream; Place 1 g vaginally once daily.  Dispense: 42.5 g; Refill: 4        Plan:   Risks and benefits of hormone replacement therapy were discussed.  Hormone replacement therapy options, including bioidentical versus non-bioidentical hormones, as well as alternatives discussed.    We spent a significant amt of time discussing estrogen replacement theropy.  We discussed the risks and benefits including breast cancer and embolic risk, osteoporosis and heart and colon health benefits.  Pt understands that there are many different ways to take estrogen and many different doses and that she does not have to take long term unless she chooses.  She understands that if she still has her uterus, she will need to take progesterone with this to decrease risk of endometrial cancer.We discussed side effects that might include but not limited to headaches, edema, nausea.   We did discuss that transdermal option of estrogen is safer than oral estradiol as transdermal methods decrease risk for blood clots and stroke as they bypass liver metabolism.     The patient and I have discussed testosterone therapy and its risks and benefits.  The risks include increased increasing cholesterol levels, facial " hair and other hair growth, acne, increased sized of clitoris, deepening of voice, anxiety as well as other side effects.  Benefits include increased energy level, increased libido, easier orgasm and potiential increased muscle mass.  Pt understands that different women have different amounts of risks and benefits to this med and that she can stop the medication at any time. (Will not begin Testosterone at this time)(consider addition of Testim 0.5g daily to inner thigh)     Plan:  Increase to E 0.5 and Prometrium 200mg HS                Follow up in 3 months.  Instructed patient to call if she experiences any side effects or has any questions.

## 2024-05-08 ENCOUNTER — HOSPITAL ENCOUNTER (OUTPATIENT)
Dept: RADIOLOGY | Facility: HOSPITAL | Age: 59
Discharge: HOME | End: 2024-05-08
Attending: OBSTETRICS & GYNECOLOGY
Payer: COMMERCIAL

## 2024-05-08 ENCOUNTER — OFFICE VISIT (OUTPATIENT)
Dept: UROGYNECOLOGY | Facility: CLINIC | Age: 59
End: 2024-05-08
Payer: COMMERCIAL

## 2024-05-08 VITALS
HEIGHT: 68 IN | SYSTOLIC BLOOD PRESSURE: 100 MMHG | WEIGHT: 157.63 LBS | BODY MASS INDEX: 23.89 KG/M2 | DIASTOLIC BLOOD PRESSURE: 60 MMHG

## 2024-05-08 VITALS — HEIGHT: 68 IN | BODY MASS INDEX: 23.79 KG/M2 | WEIGHT: 157 LBS

## 2024-05-08 DIAGNOSIS — N39.46 MIXED INCONTINENCE URGE AND STRESS: ICD-10-CM

## 2024-05-08 DIAGNOSIS — N94.10 DYSPAREUNIA IN FEMALE: Primary | ICD-10-CM

## 2024-05-08 DIAGNOSIS — Z12.31 BREAST CANCER SCREENING BY MAMMOGRAM: ICD-10-CM

## 2024-05-08 PROCEDURE — 77063 BREAST TOMOSYNTHESIS BI: CPT | Mod: TC

## 2024-05-08 PROCEDURE — 77067 SCR MAMMO BI INCL CAD: CPT | Mod: 26,,, | Performed by: RADIOLOGY

## 2024-05-08 PROCEDURE — 77063 BREAST TOMOSYNTHESIS BI: CPT | Mod: 26,,, | Performed by: RADIOLOGY

## 2024-05-08 PROCEDURE — 99999 PR PBB SHADOW E&M-EST. PATIENT-LVL V: CPT | Mod: PBBFAC,,, | Performed by: NURSE PRACTITIONER

## 2024-05-08 PROCEDURE — 77067 SCR MAMMO BI INCL CAD: CPT | Mod: TC

## 2024-05-08 PROCEDURE — 99213 OFFICE O/P EST LOW 20 MIN: CPT | Mod: S$GLB,,, | Performed by: NURSE PRACTITIONER

## 2024-05-08 RX ORDER — TOLTERODINE 4 MG/1
4 CAPSULE, EXTENDED RELEASE ORAL DAILY
Qty: 90 CAPSULE | Refills: 3 | Status: SHIPPED | OUTPATIENT
Start: 2024-05-08

## 2024-05-08 NOTE — PATIENT INSTRUCTIONS
1)  Mixed urinary incontinence, urge (mostly bothered by overactive bladder) > stress:    --Empty bladder every 3 hours.  Empty well: wait a minute, lean forward on toilet.    --Avoid dietary irritants (see sheet).  Keep diary x 3-5 days to determine your irritants.  --URGE: continue detrol 4 mg daily.  Takes 2-4 weeks to see if will have effect.  For dry mouth: get sour, sugar free lozenge or gum.                --continue vaginal estrogen--treating vaginal dryness can help urinary urgency/frequency  --continue working on anxiety/stress--see if can talk to therapist about not internalizing stress  --STRESS:  Pessary vs. Sling.      2)  Pain with intercourse:  --treat vaginal atrophy (dryness):  Continue vaginal estrogen cream as directed.  Make sure to apply internally and use around opening/inner lips.               --treating may help pain with intercourse  --levator tenderness/tension on exam:              --complete pelvic floor PT  --use vaginal dilators around tense muscles 3-4 times / week  --continue pelvic floor relaxation exercises  --vaginal valium 5 mg/ lidocaine 2%/ baclofen 4% suppositories twice daily as needed     3) RTC 1 year

## 2024-05-08 NOTE — PROGRESS NOTES
"    Urogyn follow up  05/08/2024  .  [facility] [place]  [address]  [facility] [address]    Sharri Hill  [id number]  1965      Sharri Hill is a 58 y.o. here for a urogyn follow up or mixed incontinence.    Last HPI from 07/20/2022  1)  UI:  (+) SABRINA > (+) UUI  X 2years.  (--) pads usually minimum wetness and no nighttime wetness.  Daytime frequency: Q 1-2 hours.  Nocturia: Yes: 1-2/night- states she drinks water frequently throughout the night.   (--) dysuria,  (--) hematuria,  (--) frequent UTIs.  (+) complete bladder emptying.  --taking VESIcare 10 mg daily: unsure if helping     2)  POP: Absent Symptoms:(--).  (--) vaginal bleeding. (--) vaginal discharge. (+) sexually active.  (+) dyspareunia- pain with dryness, worse with insertion and persistent even with deep penetration. Has tried PFPT in the past for pain, but states did not help. Reports she feels "tense" and sometimes she has to "grin and bear it to get through the act" of intercourse. Reports good libido/desire for intercourse and feels her pain has improved since starting vaginal estrogen (+)  Vaginal dryness.  (+) vaginal estrogen use.      3)  BM:  (--) constipation/straining.  (--) chronic diarrhea. (--) hematochezia.  (--) fecal incontinence.  (--) fecal smearing/urgency.  (+) complete evacuation.         11/18/2023  1)  Mixed urinary incontinence, urge (mostly bothered by overactive bladder) > stress:    --taking  mirabegron 50 mg daily.   --denies UI  --voiding every 30 minutes- one hour  --nocturia improved 1/ night     2)  Pain with intercourse:  --using vaginal estrogen cream twice weekly  --did go to pelvic floor PT-- did not go to last sessions    3)postmenopausal bleeding  --on HRT x 3 months  --began spotting / bleeding one month into treatment    03/15/2023  1)  Mixed urinary incontinence, urge (mostly bothered by overactive bladder) > stress:    --taking  tolteradine 4 mg daily--myrbetriq was too " expensive  --denies UI  --voiding every hour  --denies nocturia     2)  Pain with intercourse:  --using vaginal estrogen cream twice weekly  --going to pelvic floor therapy  --improved with vaginal valium/ lidocaine/ baclofen    3)postmenopausal bleeding  --resovled  --only on prometrium  --followed by Dr. Hunt  --may have D&C once cardiology clearance    4)Anxiety  --currently on lexapro 20 mg daily  --not going to talk therapy    Changes since last visit:  1)  Mixed urinary incontinence, urge (mostly bothered by overactive bladder) > stress:    --taking detrol 4 mg mg daily.    --denies UI or urgency. Nocturia 1/ night     2)  Pain with intercourse:  --still present, but improved with vaginal valium 5 mg/ lidocaine 2%/ baclofen 4% suppositories twice daily as needed              Past Medical History:   Diagnosis Date    Abnormal Pap smear of cervix     Cryo Done ([place])     Anxiety     Esophageal reflux     History of shingles ,     Right arm   and  Left hip     Menopause     Migraine     Urge incontinence        Past Surgical History:   Procedure Laterality Date    APPENDECTOMY       SECTION      COLONOSCOPY  [id number]    Polyp in the transverse. Repeat in 5 years    COSMETIC SURGERY      Nose    HYSTEROSCOPY WITH DILATION AND CURETTAGE OF UTERUS N/A 2023    Procedure: HYSTEROSCOPY, WITH DILATION AND CURETTAGE OF UTERUS;  Surgeon: Karoline Hunt MD;  Location: [facility];  Service: OB/GYN;  Laterality: N/A;    KNEE SURGERY         Family History   Problem Relation Name Age of Onset    Diabetes Maternal Grandmother [place]     Diabetes Maternal Grandfather      Hypertension Father Venkatesh Gallegos     Hyperlipidemia Father Venkatesh Gallegos     Coronary artery disease Father Venkatesh Gallegos 60    Heart disease Father Venkatesh Gallegos     Depression Mother Concepción Gallegos     Kidney disease Sister Liz Gallegos     Breast cancer Maternal Aunt           at  age 40    Colon cancer Neg Hx      Ovarian cancer Neg Hx      Cancer Neg Hx      Stroke Neg Hx         Social History     Socioeconomic History    Marital status:    Tobacco Use    Smoking status: Never    Smokeless tobacco: Never   Substance and Sexual Activity    Alcohol use: Yes     Alcohol/week: 6.0 standard drinks of alcohol     Types: 6 Glasses of wine per week     Comment: Something more    Drug use: No    Sexual activity: Yes     Partners: Male     Birth control/protection: Partner-Vasectomy, Post-menopausal     Comment:  , together since 40 years     Social Determinants of Health     Financial Resource Strain: Low Risk  (5/5/2024)    Overall Financial Resource Strain (CARDIA)     Difficulty of Paying Living Expenses: Not hard at all   Food Insecurity: No Food Insecurity (5/5/2024)    Hunger Vital Sign     Worried About Running Out of Food in the Last Year: Never true     Ran Out of Food in the Last Year: Never true   Transportation Needs: No Transportation Needs (5/5/2024)    PRAPARE - Transportation     Lack of Transportation (Medical): No     Lack of Transportation (Non-Medical): No   Physical Activity: Sufficiently Active (5/5/2024)    Exercise Vital Sign     Days of Exercise per Week: 3 days     Minutes of Exercise per Session: 50 min   Stress: Stress Concern Present (5/5/2024)    [employer] [place] [employer] - Occupational Stress Questionnaire     Feeling of Stress : To some extent   Housing Stability: Unknown (5/5/2024)    Housing Stability Vital Sign     Unable to Pay for Housing in the Last Year: No       Current Outpatient Medications   Medication Sig Dispense Refill    butalbital-acetaminophen-caff -40 mg Cap       EScitalopram oxalate (LEXAPRO) 20 MG tablet TAKE 1 TABLET DAILY 90 tablet 3    estradioL (DIVIGEL) 0.5 mg/0.5 gram (0.1 %) GlPk Place 1 application  onto the skin once daily. 90 packet 1    estradioL (ESTRACE) 0.01 % (0.1 mg/gram) vaginal cream Place 1 g  vaginally once daily. 42.5 g 4    gabapentin (NEURONTIN) 600 MG tablet Take by mouth once daily.      hydroCHLOROthiazide (HYDRODIURIL) 25 MG tablet Take 1 tablet (25 mg total) by mouth once daily. (Patient taking differently: Take 25 mg by mouth 2 (two) times daily.) 90 tablet 3    HYDROcodone-acetaminophen (NORCO) 7.5-325 mg per tablet Take 1 tablet by mouth 2 (two) times daily as needed.      losartan (COZAAR) 100 MG tablet Take 1 tablet (100 mg total) by mouth once daily. 90 tablet 3    nabumetone (RELAFEN) 750 MG tablet TAKE ONE TABLET BY MOUTH EVERY DAY 30 tablet 1    NURTEC 75 mg odt Take 75 mg by mouth daily as needed.      ondansetron (ZOFRAN-ODT) 4 MG TbDL       progesterone (PROMETRIUM) 100 MG capsule TAKE 1 CAPSULE 30 TO 60 MINUTES BEFORE BED EVERY NIGHT 90 capsule 2    progesterone (PROMETRIUM) 200 MG capsule Take 1 capsule (200 mg total) by mouth nightly. 90 capsule 1    promethazine (PHENERGAN) 25 MG tablet Take 25-50 mg by mouth every 8 (eight) hours as needed.      PROTONIX 40 mg tablet Take 40 mg by mouth every morning.      scopolamine (TRANSDERM-SCOP) 1.3-1.5 mg (1 mg over 3 days) 1 patch Every 3 (three) days.      tirzepatide (MOUNJARO SUBQ) Inject into the skin.      tiZANidine (ZANAFLEX) 4 MG tablet Take 4 mg by mouth 2 (two) times daily.      valACYclovir (VALTREX) 1000 MG tablet Take 1 tablet (1,000 mg total) by mouth 3 (three) times daily. 21 tablet 0    zolpidem (AMBIEN) 10 mg Tab TAKE ONE TABLET BY MOUTH AT BEDTIME 30 tablet 0    LIDOCAINE 2 %, VALIUM 5 MG, BACLOFEN 4 % SUPPOSITORY Place 1 suppository rectally 2 (two) times daily. 60 each 5    tolterodine (DETROL LA) 4 MG 24 hr capsule Take 1 capsule (4 mg total) by mouth once daily. 90 capsule 3     No current facility-administered medications for this visit.       Review of patient's allergies indicates:  No Known Allergies    Well woman:  Pap test: 05/2023 normal/HPV neg.  History of abnormal paps: Yes - 30 years ago, cryosurgery.   "History of STIs:  No  Mammogram: Date of last:5/2023 Result: Normal  Colonoscopy: Date of last: 2016.  Result:  polyp.  Repeat due:  2021--scheduled next week.    DEXA:  Ordered per Dr. Hudson     ROS:  As per HPI.      Exam  /60 (BP Location: Right arm, Patient Position: Sitting, BP Method: Medium (Manual))   Ht 5' 8" (1.727 m)   Wt 71.5 kg (157 lb 10.1 oz)   LMP 10/17/2018 Comment:   2018  BMI 23.97 kg/m²   General: alert and oriented, no acute distress  Respiratory: normal respiratory effort  Abd: soft, non-tender, non-distended    Pelvic--deferred    Impression  1. Dyspareunia in female        2. Mixed incontinence urge and stress  tolterodine (DETROL LA) 4 MG 24 hr capsule            We reviewed the above issues and discussed options for short-term versus long-term management of her problems.   Plan:     1)  Mixed urinary incontinence, urge (mostly bothered by overactive bladder) > stress:    --Empty bladder every 3 hours.  Empty well: wait a minute, lean forward on toilet.    --Avoid dietary irritants (see sheet).  Keep diary x 3-5 days to determine your irritants.  --URGE: continue detrol 4 mg daily.  Takes 2-4 weeks to see if will have effect.  For dry mouth: get sour, sugar free lozenge or gum.                --continue vaginal estrogen--treating vaginal dryness can help urinary urgency/frequency  --continue working on anxiety/stress--see if can talk to therapist about not internalizing stress  --STRESS:  Pessary vs. Sling.      2)  Pain with intercourse:  --treat vaginal atrophy (dryness):  Continue vaginal estrogen cream as directed.  Make sure to apply internally and use around opening/inner lips.               --treating may help pain with intercourse  --levator tenderness/tension on exam:              --complete pelvic floor PT  --use vaginal dilators around tense muscles 3-4 times / week  --continue pelvic floor relaxation exercises  --vaginal valium 5 mg/ lidocaine 2%/ baclofen 4% " suppositories twice daily as needed--I have sent in the prescription to [facility] in [place], [place].  Their phone number is [phone].  Ask for the compounding department.       3) RTC 1 year      I spent a total of 20 minutes on the day of the visit.    This includes face to face time and non-face to face time preparing to see the patient (eg, review of tests), obtaining and/or reviewing separately obtained history, documenting clinical information in the electronic or other health record, independently interpreting results and communicating results to the patient/family/caregiver, or care coordinator.   [facility]  Division of Female Pelvic Medicine and Reconstructive Surgery  Department of Obstetrics & Gynecology

## 2024-05-10 ENCOUNTER — PATIENT MESSAGE (OUTPATIENT)
Dept: PRIMARY CARE CLINIC | Facility: CLINIC | Age: 59
End: 2024-05-10
Payer: COMMERCIAL

## 2024-05-10 DIAGNOSIS — G47.09 OTHER INSOMNIA: ICD-10-CM

## 2024-05-10 DIAGNOSIS — M17.12 PRIMARY OSTEOARTHRITIS OF LEFT KNEE: ICD-10-CM

## 2024-05-10 RX ORDER — ZOLPIDEM TARTRATE 10 MG/1
10 TABLET ORAL NIGHTLY
Qty: 30 TABLET | Refills: 0 | Status: CANCELLED | OUTPATIENT
Start: 2024-05-10

## 2024-05-10 NOTE — TELEPHONE ENCOUNTER
No care due was identified.  [facility] Embedded Care Due Messages. Reference number: [id number].   5/10/2024 4:19:14 PM CDT

## 2024-05-10 NOTE — TELEPHONE ENCOUNTER
No care due was identified.  [facility] Embedded Care Due Messages. Reference number: [id number].   5/10/2024 4:20:10 PM CDT

## 2024-05-13 RX ORDER — ZOLPIDEM TARTRATE 10 MG/1
TABLET ORAL
Qty: 30 TABLET | Refills: 0 | Status: SHIPPED | OUTPATIENT
Start: 2024-05-13 | End: 2024-06-11

## 2024-05-13 RX ORDER — NABUMETONE 750 MG/1
750 TABLET, FILM COATED ORAL DAILY PRN
Qty: 30 TABLET | Refills: 1 | Status: SHIPPED | OUTPATIENT
Start: 2024-05-13 | End: 2024-06-11 | Stop reason: SDUPTHER

## 2024-05-14 ENCOUNTER — PATIENT MESSAGE (OUTPATIENT)
Dept: CARDIOLOGY | Facility: CLINIC | Age: 59
End: 2024-05-14
Payer: COMMERCIAL

## 2024-05-14 ENCOUNTER — PATIENT MESSAGE (OUTPATIENT)
Dept: PRIMARY CARE CLINIC | Facility: CLINIC | Age: 59
End: 2024-05-14
Payer: COMMERCIAL

## 2024-06-11 DIAGNOSIS — G47.09 OTHER INSOMNIA: ICD-10-CM

## 2024-06-11 DIAGNOSIS — M17.12 PRIMARY OSTEOARTHRITIS OF LEFT KNEE: ICD-10-CM

## 2024-06-11 RX ORDER — ZOLPIDEM TARTRATE 10 MG/1
TABLET ORAL
Qty: 30 TABLET | Refills: 0 | Status: CANCELLED | OUTPATIENT
Start: 2024-06-11

## 2024-06-11 RX ORDER — ZOLPIDEM TARTRATE 10 MG/1
10 TABLET ORAL NIGHTLY
Qty: 30 TABLET | Refills: 0 | Status: SHIPPED | OUTPATIENT
Start: 2024-06-11

## 2024-06-11 RX ORDER — NABUMETONE 750 MG/1
750 TABLET, FILM COATED ORAL DAILY PRN
Qty: 30 TABLET | Refills: 1 | Status: SHIPPED | OUTPATIENT
Start: 2024-06-11

## 2024-06-11 NOTE — TELEPHONE ENCOUNTER
No care due was identified.  [facility] Embedded Care Due Messages. Reference number: [id number].   6/11/2024 3:52:08 PM CDT

## 2024-06-11 NOTE — TELEPHONE ENCOUNTER
No care due was identified.  [facility] Embedded Care Due Messages. Reference number: [id number].   6/11/2024 3:56:37 PM CDT

## 2024-06-28 ENCOUNTER — PATIENT MESSAGE (OUTPATIENT)
Dept: PRIMARY CARE CLINIC | Facility: CLINIC | Age: 59
End: 2024-06-28
Payer: COMMERCIAL

## 2024-07-03 DIAGNOSIS — G47.09 OTHER INSOMNIA: ICD-10-CM

## 2024-07-03 RX ORDER — ZOLPIDEM TARTRATE 10 MG/1
TABLET ORAL
Qty: 30 TABLET | Refills: 0 | Status: SHIPPED | OUTPATIENT
Start: 2024-07-12

## 2024-07-03 NOTE — TELEPHONE ENCOUNTER
No care due was identified.  [facility] Embedded Care Due Messages. Reference number: [id number].   7/03/2024 11:15:21 AM CDT

## 2024-07-11 ENCOUNTER — PATIENT MESSAGE (OUTPATIENT)
Dept: CARDIOLOGY | Facility: CLINIC | Age: 59
End: 2024-07-11
Payer: COMMERCIAL

## 2024-07-25 NOTE — TELEPHONE ENCOUNTER
No care due was identified.  [facility] Embedded Care Due Messages. Reference number: [id number].   7/25/2024 5:27:37 PM CDT

## 2024-07-29 RX ORDER — GABAPENTIN 300 MG/1
300 CAPSULE ORAL 2 TIMES DAILY
Qty: 180 CAPSULE | Refills: 0 | Status: SHIPPED | OUTPATIENT
Start: 2024-07-29

## 2024-07-29 RX ORDER — GABAPENTIN 100 MG/1
CAPSULE ORAL
Refills: 0 | OUTPATIENT
Start: 2024-07-29

## 2024-07-29 NOTE — TELEPHONE ENCOUNTER
2 things     1) Called and spoke with patient to verify current dose of gabapentin. Takes the Gabapentin 300 mg BID PRN for nerve pain/shingles. [facility]     2) Will be flying for vacation coming up and asking if RX to help with flight anxiety can be sent to [facility]. Has used Xanax and Valium in past for flight anxiety. Asking for about #6 tabs of something low dose if possible.

## 2024-08-05 PROBLEM — Z00.00 ANNUAL PHYSICAL EXAM: Status: RESOLVED | Noted: 2019-09-13 | Resolved: 2024-08-05

## 2024-08-08 ENCOUNTER — PATIENT MESSAGE (OUTPATIENT)
Dept: CARDIOLOGY | Facility: CLINIC | Age: 59
End: 2024-08-08
Payer: COMMERCIAL

## 2024-08-08 DIAGNOSIS — G47.09 OTHER INSOMNIA: ICD-10-CM

## 2024-08-08 RX ORDER — ZOLPIDEM TARTRATE 10 MG/1
TABLET ORAL
Qty: 30 TABLET | Refills: 0 | Status: SHIPPED | OUTPATIENT
Start: 2024-08-08

## 2024-08-20 RX ORDER — HYDROCHLOROTHIAZIDE 25 MG/1
25 TABLET ORAL 2 TIMES DAILY
Qty: 60 TABLET | Refills: 11 | Status: SHIPPED | OUTPATIENT
Start: 2024-08-20

## 2024-09-03 DIAGNOSIS — G47.09 OTHER INSOMNIA: ICD-10-CM

## 2024-09-03 DIAGNOSIS — M17.12 PRIMARY OSTEOARTHRITIS OF LEFT KNEE: ICD-10-CM

## 2024-09-04 RX ORDER — ZOLPIDEM TARTRATE 10 MG/1
10 TABLET ORAL NIGHTLY
Qty: 30 TABLET | Refills: 0 | Status: SHIPPED | OUTPATIENT
Start: 2024-09-04

## 2024-09-04 RX ORDER — NABUMETONE 750 MG/1
750 TABLET, FILM COATED ORAL DAILY PRN
Qty: 30 TABLET | Refills: 1 | Status: SHIPPED | OUTPATIENT
Start: 2024-09-04

## 2024-09-04 NOTE — TELEPHONE ENCOUNTER
No care due was identified.  [facility] Embedded Care Due Messages. Reference number: [id number].   9/03/2024 7:42:23 PM CDT

## 2024-09-10 ENCOUNTER — PATIENT MESSAGE (OUTPATIENT)
Dept: PRIMARY CARE CLINIC | Facility: CLINIC | Age: 59
End: 2024-09-10
Payer: COMMERCIAL

## 2024-09-11 DIAGNOSIS — N94.10 DYSPAREUNIA IN FEMALE: ICD-10-CM

## 2024-09-11 DIAGNOSIS — N39.46 MIXED INCONTINENCE URGE AND STRESS: ICD-10-CM

## 2024-09-12 RX ORDER — ESTRADIOL 0.1 MG/G
1 CREAM VAGINAL DAILY
Qty: 85 G | Refills: 2 | Status: SHIPPED | OUTPATIENT
Start: 2024-09-12

## 2024-09-12 NOTE — TELEPHONE ENCOUNTER
"Refill Routing Note   Medication(s) are not appropriate for processing by [facility] for the following reason(s):        Clarification of medication (Rx) details( pharmacy request old rx of 3x's weekly-pended to recent usage of " 1g daily" for review)    [facility] action(s):  Defer        Medication Therapy Plan: Discontinued by: Januayr Gloria MD on 5/6/2024 . SIG was changed at that time to " 1 gram daily" and was filled.      Appointments  past 12m or future 3m with PCP    Date Provider   Last Visit   5/6/2024 January Gloria MD   Next Visit   9/16/2024 January Gloria MD   ED visits in past 90 days: 0        Note composed:7:37 AM 09/12/2024          "

## 2024-09-16 ENCOUNTER — OFFICE VISIT (OUTPATIENT)
Dept: OBSTETRICS AND GYNECOLOGY | Facility: CLINIC | Age: 59
End: 2024-09-16
Payer: COMMERCIAL

## 2024-09-16 DIAGNOSIS — N94.10 DYSPAREUNIA IN FEMALE: ICD-10-CM

## 2024-09-16 DIAGNOSIS — N39.46 MIXED INCONTINENCE URGE AND STRESS: ICD-10-CM

## 2024-09-16 DIAGNOSIS — Z79.890 POSTMENOPAUSAL HORMONE REPLACEMENT THERAPY: ICD-10-CM

## 2024-09-16 DIAGNOSIS — L65.9 HAIR LOSS: ICD-10-CM

## 2024-09-16 DIAGNOSIS — N95.1 MENOPAUSAL SYNDROME: Primary | ICD-10-CM

## 2024-09-16 PROCEDURE — 99214 OFFICE O/P EST MOD 30 MIN: CPT | Mod: 95,,, | Performed by: OBSTETRICS & GYNECOLOGY

## 2024-09-16 RX ORDER — ESTRADIOL 0.5 MG/.5G
1 GEL TOPICAL DAILY
Qty: 90 PACKET | Refills: 1 | Status: SHIPPED | OUTPATIENT
Start: 2024-09-16 | End: 2025-09-16

## 2024-09-16 RX ORDER — ESTRADIOL 0.1 MG/G
1 CREAM VAGINAL DAILY
Qty: 85 G | Refills: 2 | Status: SHIPPED | OUTPATIENT
Start: 2024-09-16

## 2024-09-16 RX ORDER — TESTOSTERONE 50 MG/5G
0.5 GEL TRANSDERMAL DAILY
Qty: 150 G | Refills: 0 | Status: SHIPPED | OUTPATIENT
Start: 2024-09-16

## 2024-09-16 RX ORDER — PROGESTERONE 200 MG/1
200 CAPSULE ORAL NIGHTLY
Qty: 90 CAPSULE | Refills: 1 | Status: SHIPPED | OUTPATIENT
Start: 2024-09-16

## 2024-09-16 RX ORDER — FINASTERIDE 1 MG/1
1 TABLET, FILM COATED ORAL DAILY
Qty: 30 TABLET | Refills: 6 | Status: SHIPPED | OUTPATIENT
Start: 2024-09-16

## 2024-09-16 NOTE — PROGRESS NOTES
The patient location is: Home  The chief complaint leading to consultation is: Menopausal syndrome    Visit type: audiovisual    Face to Face time with patient: 26 minutes  35 minutes of total time spent on the encounter, which includes face to face time and non-face to face time preparing to see the patient (eg, review of tests), Obtaining and/or reviewing separately obtained history, Documenting clinical information in the electronic or other health record, Independently interpreting results (not separately reported) and communicating results to the patient/family/caregiver, or Care coordination (not separately reported).         Each patient to whom he or she provides medical services by telemedicine is:  (1) informed of the relationship between the physician and patient and the respective role of any other health care provider with respect to management of the patient; and (2) notified that he or she may decline to receive medical services by telemedicine and may withdraw from such care at any time.    Notes:      Sharri Hill is a 59 y.o. female who is here for follow-up of hormone replacement therapy.  At her last visit on 5-6-2024, she said She is currently taking Divigel 0.25 and Prometrium 100mg HS.  The patient states the following symptoms have improved:  she feels better since losing weight on Mounjaro. (This has also resolved her loose stools which were causing so much of her fatigue.  Her thinning head hair has gradually improved. (Has started Nutrafol ) . .      Her main concern today is Continued low libido.     PLAN on 5-6-2024:  Increase to E 0.5 and Prometrium 200mg HS     She is currently taking Divigel 0.05 and prometrium 200mg HS.  The patient states the following symptoms have improved:  Feels much better overall. Sleep is normal. Blood pressure and stomach problems have resolved since losing about 28 pounds on Mounjaro.  Her main concern today is continued low libido and some hair loss.   She had the following side effects: none.  Patient denies post-menopausal vaginal bleeding. The patient is sexually active.     No visits with results within 3 Month(s) from this visit.   Latest known visit with results is:   Lab Visit on 05/08/2024   Component Date Value Ref Range Status    WBC 05/08/2024 3.58 (L)  3.90 - 12.70 K/uL Final    RBC 05/08/2024 3.45 (L)  4.00 - 5.40 M/uL Final    Hemoglobin 05/08/2024 12.2  12.0 - 16.0 g/dL Final    Hematocrit 05/08/2024 36.9 (L)  37.0 - 48.5 % Final    MCV 05/08/2024 107 (H)  82 - 98 fL Final    MCH 05/08/2024 35.4 (H)  27.0 - 31.0 pg Final    MCHC 05/08/2024 33.1  32.0 - 36.0 g/dL Final    RDW 05/08/2024 12.7  11.5 - 14.5 % Final    Platelets 05/08/2024 238  150 - 450 K/uL Final    MPV 05/08/2024 10.7  9.2 - 12.9 fL Final    Immature Granulocytes 05/08/2024 0.3  0.0 - 0.5 % Final    Gran # (ANC) 05/08/2024 1.9  1.8 - 7.7 K/uL Final    Immature Grans (Abs) 05/08/2024 0.01  0.00 - 0.04 K/uL Final    Lymph # 05/08/2024 1.2  1.0 - 4.8 K/uL Final    Mono # 05/08/2024 0.4  0.3 - 1.0 K/uL Final    Eos # 05/08/2024 0.1  0.0 - 0.5 K/uL Final    Baso # 05/08/2024 0.05  0.00 - 0.20 K/uL Final    nRBC 05/08/2024 0  0 /100 WBC Final    Gran % 05/08/2024 51.7  38.0 - 73.0 % Final    Lymph % 05/08/2024 32.4  18.0 - 48.0 % Final    Mono % 05/08/2024 10.6  4.0 - 15.0 % Final    Eosinophil % 05/08/2024 3.6  0.0 - 8.0 % Final    Basophil % 05/08/2024 1.4  0.0 - 1.9 % Final    Differential Method 05/08/2024 Automated   Final    Sodium 05/08/2024 139  136 - 145 mmol/L Final    Potassium 05/08/2024 3.7  3.5 - 5.1 mmol/L Final    Chloride 05/08/2024 102  95 - 110 mmol/L Final    CO2 05/08/2024 20 (L)  23 - 29 mmol/L Final    Glucose 05/08/2024 82  70 - 110 mg/dL Final    BUN 05/08/2024 22 (H)  6 - 20 mg/dL Final    Creatinine 05/08/2024 0.9  0.5 - 1.4 mg/dL Final    Calcium 05/08/2024 10.0  8.7 - 10.5 mg/dL Final    Total Protein 05/08/2024 8.0  6.0 - 8.4 g/dL Final    Albumin 05/08/2024  4.4  3.5 - 5.2 g/dL Final    Total Bilirubin 2024 0.7  0.1 - 1.0 mg/dL Final    Alkaline Phosphatase 2024 59  55 - 135 U/L Final    AST 2024 19  10 - 40 U/L Final    ALT 2024 20  10 - 44 U/L Final    eGFR 2024 >60.0  >60 mL/min/1.73 m^2 Final    Anion Gap 2024 17 (H)  8 - 16 mmol/L Final    Cholesterol 2024 264 (H)  120 - 199 mg/dL Final    Triglycerides 2024 103  30 - 150 mg/dL Final    HDL 2024 84 (H)  40 - 75 mg/dL Final    LDL Cholesterol 2024 159.4 (H)  63.0 - 159.0 mg/dL Final    HDL/Cholesterol Ratio 2024 31.8  20.0 - 50.0 % Final    Total Cholesterol/HDL Ratio 2024 3.1  2.0 - 5.0 Final    Non-HDL Cholesterol 2024 180  mg/dL Final    TSH 2024 1.572  0.400 - 4.000 uIU/mL Final       Past Medical History:   Diagnosis Date    Abnormal Pap smear of cervix     Cryo Done ([place])     Anxiety     Esophageal reflux     History of shingles 2021    Right arm   and  Left hip     Menopause 2018    Migraine     Urge incontinence      Past Surgical History:   Procedure Laterality Date    APPENDECTOMY       SECTION      COLONOSCOPY  [id number]    Polyp in the transverse. Repeat in 5 years    COSMETIC SURGERY      Nose    HYSTEROSCOPY WITH DILATION AND CURETTAGE OF UTERUS N/A 2023    Procedure: HYSTEROSCOPY, WITH DILATION AND CURETTAGE OF UTERUS;  Surgeon: Karoline Hunt MD;  Location: [facility] OR;  Service: OB/GYN;  Laterality: N/A;    KNEE SURGERY       Social History     Tobacco Use    Smoking status: Never    Smokeless tobacco: Never   Substance Use Topics    Alcohol use: Yes     Alcohol/week: 6.0 standard drinks of alcohol     Types: 6 Glasses of wine per week     Comment: Something more    Drug use: No     Family History   Problem Relation Name Age of Onset    Diabetes Maternal Grandmother [place]     Diabetes Maternal Grandfather      Hypertension Father Riddle Rosy     Hyperlipidemia  Father Venkatesh Gallegos     Coronary artery disease Father Venkatesh Gallegos 60    Heart disease Father Venkatesh Gallegos     Depression Mother Concepción Gallegos     Kidney disease Sister Liz Gallegos     Breast cancer Maternal Aunt           at age 40    Colon cancer Neg Hx      Ovarian cancer Neg Hx      Cancer Neg Hx      Stroke Neg Hx       OB History    Para Term  AB Living   1 1 1     1   SAB IAB Ectopic Multiple Live Births           1      # Outcome Date GA Lbr Dylan/2nd Weight Sex Type Anes PTL Lv   1 Term 95 40w0d  2.495 kg (5 lb 8 oz) F CS-Unspec   EDYTA       Current Outpatient Medications:     butalbital-acetaminophen-caff -40 mg Cap, , Disp: , Rfl:     EScitalopram oxalate (LEXAPRO) 20 MG tablet, TAKE 1 TABLET DAILY, Disp: 90 tablet, Rfl: 3    estradioL (DIVIGEL) 0.5 mg/0.5 gram (0.1 %) GlPk, Place 1 application  onto the skin once daily., Disp: 90 packet, Rfl: 1    estradioL (ESTRACE) 0.01 % (0.1 mg/gram) vaginal cream, Place 1 g vaginally once daily., Disp: 85 g, Rfl: 2    finasteride (PROPECIA) 1 mg tablet, Take 1 tablet (1 mg total) by mouth once daily., Disp: 30 tablet, Rfl: 6    gabapentin (NEURONTIN) 300 MG capsule, Take 1 capsule (300 mg total) by mouth 2 (two) times daily., Disp: 180 capsule, Rfl: 0    gabapentin (NEURONTIN) 600 MG tablet, Take by mouth once daily., Disp: , Rfl:     hydroCHLOROthiazide (HYDRODIURIL) 25 MG tablet, Take 1 tablet (25 mg total) by mouth 2 (two) times daily., Disp: 60 tablet, Rfl: 11    HYDROcodone-acetaminophen (NORCO) 7.5-325 mg per tablet, Take 1 tablet by mouth 2 (two) times daily as needed., Disp: , Rfl:     LIDOCAINE 2 %, VALIUM 5 MG, BACLOFEN 4 % SUPPOSITORY, Place 1 suppository rectally 2 (two) times daily., Disp: 60 each, Rfl: 5    losartan (COZAAR) 100 MG tablet, Take 1 tablet (100 mg total) by mouth once daily., Disp: 90 tablet, Rfl: 3    nabumetone (RELAFEN) 750 MG tablet, TAKE ONE TABLET BY MOUTH EVERY DAY AS NEEDED FOR PAIN,  Disp: 30 tablet, Rfl: 1    NURTEC 75 mg odt, Take 75 mg by mouth daily as needed., Disp: , Rfl:     ondansetron (ZOFRAN-ODT) 4 MG TbDL, , Disp: , Rfl:     progesterone (PROMETRIUM) 200 MG capsule, Take 1 capsule (200 mg total) by mouth nightly., Disp: 90 capsule, Rfl: 1    promethazine (PHENERGAN) 25 MG tablet, Take 25-50 mg by mouth every 8 (eight) hours as needed., Disp: , Rfl:     PROTONIX 40 mg tablet, Take 40 mg by mouth every morning., Disp: , Rfl:     scopolamine (TRANSDERM-SCOP) 1.3-1.5 mg (1 mg over 3 days), 1 patch Every 3 (three) days., Disp: , Rfl:     testosterone (TESTIM) 50 mg/5 gram (1 %) Gel, Apply 0.5 g topically once daily., Disp: 150 g, Rfl: 0    tirzepatide (MOUNJARO SUBQ), Inject into the skin., Disp: , Rfl:     tiZANidine (ZANAFLEX) 4 MG tablet, Take 4 mg by mouth 2 (two) times daily., Disp: , Rfl:     tolterodine (DETROL LA) 4 MG 24 hr capsule, Take 1 capsule (4 mg total) by mouth once daily., Disp: 90 capsule, Rfl: 3    valACYclovir (VALTREX) 1000 MG tablet, Take 1 tablet (1,000 mg total) by mouth 3 (three) times daily., Disp: 21 tablet, Rfl: 0    zolpidem (AMBIEN) 10 mg Tab, TAKE ONE TABLET BY MOUTH AT BEDTIME, Disp: 30 tablet, Rfl: 0    There were no vitals filed for this visit.  There is no height or weight on file to calculate BMI.       Assessment:    Menopausal syndrome  -     testosterone (TESTIM) 50 mg/5 gram (1 %) Gel; Apply 0.5 g topically once daily.  Dispense: 150 g; Refill: 0    Hair loss  -     finasteride (PROPECIA) 1 mg tablet; Take 1 tablet (1 mg total) by mouth once daily.  Dispense: 30 tablet; Refill: 6    Mixed incontinence urge and stress  -     estradioL (ESTRACE) 0.01 % (0.1 mg/gram) vaginal cream; Place 1 g vaginally once daily.  Dispense: 85 g; Refill: 2    Dyspareunia in female  -     estradioL (ESTRACE) 0.01 % (0.1 mg/gram) vaginal cream; Place 1 g vaginally once daily.  Dispense: 85 g; Refill: 2    Postmenopausal hormone replacement therapy  -     estradioL  (DIVIGEL) 0.5 mg/0.5 gram (0.1 %) GlPk; Place 1 application  onto the skin once daily.  Dispense: 90 packet; Refill: 1  -     progesterone (PROMETRIUM) 200 MG capsule; Take 1 capsule (200 mg total) by mouth nightly.  Dispense: 90 capsule; Refill: 1        Plan:   Risks and benefits of hormone replacement therapy were discussed.  Hormone replacement therapy options, including bioidentical versus non-bioidentical hormones, as well as alternatives discussed.      We spent a significant amt of time discussing estrogen replacement theropy.  We discussed the risks and benefits including breast cancer and embolic risk, osteoporosis and heart and colon health benefits.  Pt understands that there are many different ways to take estrogen and many different doses and that she does not have to take long term unless she chooses.  She understands that if she still has her uterus, she will need to take progesterone with this to decrease risk of endometrial cancer.We discussed side effects that might include but not limited to headaches, edema, nausea.   We did discuss that transdermal option of estrogen is safer than oral estradiol as transdermal methods decrease risk for blood clots and stroke as they bypass liver metabolism.     The patient and I have discussed testosterone therapy and its risks and benefits.  The risks include increased increasing cholesterol levels, facial hair and other hair growth, acne, increased sized of clitoris, deepening of voice, anxiety as well as other side effects.  Benefits include increased energy level, increased libido, easier orgasm and potiential increased muscle mass.  Pt understands that different women have different amounts of risks and benefits to this med and that she can stop the medication at any time.        Continue:  Divigel 0.5  Prometrium 200  Estrace cream three times weekly      Start:  Finasteride 1mg   Testim 1% 0.5g to inner thigh/behind knee daily      Follow up in 6  months.  Instructed patient to call if she experiences any side effects or has any questions.

## 2024-09-17 ENCOUNTER — OFFICE VISIT (OUTPATIENT)
Dept: PRIMARY CARE CLINIC | Facility: CLINIC | Age: 59
End: 2024-09-17
Payer: COMMERCIAL

## 2024-09-17 DIAGNOSIS — G47.00 INSOMNIA, UNSPECIFIED TYPE: ICD-10-CM

## 2024-09-17 DIAGNOSIS — I10 BENIGN ESSENTIAL HYPERTENSION: Primary | ICD-10-CM

## 2024-09-17 PROCEDURE — 99214 OFFICE O/P EST MOD 30 MIN: CPT | Mod: 95,,, | Performed by: INTERNAL MEDICINE

## 2024-09-17 NOTE — PROGRESS NOTES
The patient location is: home   The chief complaint leading to consultation is: medication management     Visit type: audiovisual    Face to Face time with patient: 10 min   15 minutes of total time spent on the encounter, which includes face to face time and non-face to face time preparing to see the patient (eg, review of tests), Obtaining and/or reviewing separately obtained history, Documenting clinical information in the electronic or other health record, Independently interpreting results (not separately reported) and communicating results to the patient/family/caregiver, or Care coordination (not separately reported).         Each patient to whom he or she provides medical services by telemedicine is:  (1) informed of the relationship between the physician and patient and the respective role of any other health care provider with respect to management of the patient; and (2) notified that he or she may decline to receive medical services by telemedicine and may withdraw from such care at any time.    Notes:  [facility] Note    Chief Complaint      Chief Complaint   Patient presents with    Medication Management       History of Present Illness      Sharri Hill is a 59 y.o. female who presents today for   Chief Complaint   Patient presents with    Medication Management   .  Patient comes to appointment here for virtual visit for medication management for insomnia and blood pressure . She has lost over 30 lbs in the last few months with mounjaro . Had episode of near syncope . She shea stopped her blood pressure medications and her bp this am 117/78 . She is stable on her current Ambien regimen for insomnia ,.  reviewed . She is not due for labs until next visit     Problem List Items Addressed This Visit          Cardiac/Vascular    Benign essential hypertension - Primary    Overview     Off meds after weight loss , bp looks great              Other    Insomnia    Overview       reviewed cont ambien prn               Past Medical History:  Past Medical History:   Diagnosis Date    Abnormal Pap smear of cervix     Cryo Done ([place])     Anxiety     Esophageal reflux     History of shingles ,     Right arm   and  Left hip     Menopause 2018    Migraine     Urge incontinence        Past Surgical History:  Past Surgical History:   Procedure Laterality Date    APPENDECTOMY       SECTION      COLONOSCOPY  [id number]    Polyp in the transverse. Repeat in 5 years    COSMETIC SURGERY      Nose    HYSTEROSCOPY WITH DILATION AND CURETTAGE OF UTERUS N/A 2023    Procedure: HYSTEROSCOPY, WITH DILATION AND CURETTAGE OF UTERUS;  Surgeon: Karoline Hunt MD;  Location: [facility];  Service: OB/GYN;  Laterality: N/A;    KNEE SURGERY         Family History:  family history includes Breast cancer in her maternal aunt; Coronary artery disease (age of onset: 60) in her father; Depression in her mother; Diabetes in her maternal grandfather and maternal grandmother; Heart disease in her father; Hyperlipidemia in her father; Hypertension in her father; Kidney disease in her sister.    Social History:  Social History     Socioeconomic History    Marital status:    Tobacco Use    Smoking status: Never    Smokeless tobacco: Never   Substance and Sexual Activity    Alcohol use: Yes     Alcohol/week: 6.0 standard drinks of alcohol     Types: 6 Glasses of wine per week     Comment: Something more    Drug use: No    Sexual activity: Yes     Partners: Male     Birth control/protection: Partner-Vasectomy, Post-menopausal     Comment:  , together since 40 years     Social Determinants of Health     Financial Resource Strain: Low Risk  (9/10/2024)    Overall Financial Resource Strain (CARDIA)     Difficulty of Paying Living Expenses: Not hard at all   Food Insecurity: No Food Insecurity (9/10/2024)    Hunger Vital Sign     Worried About Running Out of Food in  the Last Year: Never true     Ran Out of Food in the Last Year: Never true   Transportation Needs: No Transportation Needs (5/5/2024)    PRAPARE - Transportation     Lack of Transportation (Medical): No     Lack of Transportation (Non-Medical): No   Physical Activity: Sufficiently Active (9/10/2024)    Exercise Vital Sign     Days of Exercise per Week: 3 days     Minutes of Exercise per Session: 60 min   Stress: No Stress Concern Present (9/10/2024)    [employer] - Occupational Stress Questionnaire     Feeling of Stress : Only a little   Housing Stability: Unknown (9/10/2024)    Housing Stability Vital Sign     Unable to Pay for Housing in the Last Year: No       Review of Systems:   Review of Systems   HENT:  Negative for hearing loss.    Eyes:  Negative for discharge.   Respiratory:  Negative for wheezing.    Cardiovascular:  Negative for chest pain and palpitations.   Gastrointestinal:  Negative for blood in stool, constipation, diarrhea and vomiting.   Genitourinary:  Negative for dysuria and hematuria.   Musculoskeletal:  Positive for neck pain.   Neurological:  Positive for headaches. Negative for weakness.   Endo/Heme/Allergies:  Negative for polydipsia.         Medications:  Outpatient Encounter Medications as of 9/17/2024   Medication Sig Note Dispense Refill    butalbital-acetaminophen-caff -40 mg Cap  5/10/2023: Hold DOS      EScitalopram oxalate (LEXAPRO) 20 MG tablet TAKE 1 TABLET DAILY  90 tablet 3    estradioL (DIVIGEL) 0.5 mg/0.5 gram (0.1 %) GlPk Place 1 application  onto the skin once daily.  90 packet 1    estradioL (ESTRACE) 0.01 % (0.1 mg/gram) vaginal cream Place 1 g vaginally once daily.  85 g 2    finasteride (PROPECIA) 1 mg tablet Take 1 tablet (1 mg total) by mouth once daily.  30 tablet 6    gabapentin (NEURONTIN) 300 MG capsule Take 1 capsule (300 mg total) by mouth 2 (two) times daily.  180 capsule 0    gabapentin (NEURONTIN) 600 MG tablet Take by  mouth once daily.       HYDROcodone-acetaminophen (NORCO) 7.5-325 mg per tablet Take 1 tablet by mouth 2 (two) times daily as needed. 5/10/2023: May take DOS      LIDOCAINE 2 %, VALIUM 5 MG, BACLOFEN 4 % SUPPOSITORY Place 1 suppository rectally 2 (two) times daily.  60 each 5    nabumetone (RELAFEN) 750 MG tablet TAKE ONE TABLET BY MOUTH EVERY DAY AS NEEDED FOR PAIN  30 tablet 1    NURTEC 75 mg odt Take 75 mg by mouth daily as needed.       ondansetron (ZOFRAN-ODT) 4 MG TbDL        progesterone (PROMETRIUM) 200 MG capsule Take 1 capsule (200 mg total) by mouth nightly.  90 capsule 1    promethazine (PHENERGAN) 25 MG tablet Take 25-50 mg by mouth every 8 (eight) hours as needed.       PROTONIX 40 mg tablet Take 40 mg by mouth every morning. 5/10/2023: Take DOS      scopolamine (TRANSDERM-SCOP) 1.3-1.5 mg (1 mg over 3 days) 1 patch Every 3 (three) days.       testosterone (TESTIM) 50 mg/5 gram (1 %) Gel Apply 0.5 g topically once daily.  150 g 0    tirzepatide (MOUNJARO SUBQ) Inject into the skin.       tiZANidine (ZANAFLEX) 4 MG tablet Take 4 mg by mouth 2 (two) times daily. 5/10/2023: Take DOS      tolterodine (DETROL LA) 4 MG 24 hr capsule Take 1 capsule (4 mg total) by mouth once daily.  90 capsule 3    valACYclovir (VALTREX) 1000 MG tablet Take 1 tablet (1,000 mg total) by mouth 3 (three) times daily.  21 tablet 0    zolpidem (AMBIEN) 10 mg Tab TAKE ONE TABLET BY MOUTH AT BEDTIME  30 tablet 0    [DISCONTINUED] estradioL (DIVIGEL) 0.5 mg/0.5 gram (0.1 %) GlPk Place 1 application  onto the skin once daily.  90 packet 1    [DISCONTINUED] estradioL (ESTRACE) 0.01 % (0.1 mg/gram) vaginal cream Place 1 g vaginally once daily.  42.5 g 4    [DISCONTINUED] estradioL (ESTRACE) 0.01 % (0.1 mg/gram) vaginal cream Place 1 g vaginally once daily.  85 g 2    [DISCONTINUED] hydroCHLOROthiazide (HYDRODIURIL) 25 MG tablet Take 1 tablet (25 mg total) by mouth 2 (two) times daily.  60 tablet 11    [DISCONTINUED] losartan (COZAAR)  "100 MG tablet Take 1 tablet (100 mg total) by mouth once daily.  90 tablet 3    [DISCONTINUED] progesterone (PROMETRIUM) 100 MG capsule TAKE 1 CAPSULE 30 TO 60 MINUTES BEFORE BED EVERY NIGHT  90 capsule 2    [DISCONTINUED] progesterone (PROMETRIUM) 200 MG capsule Take 1 capsule (200 mg total) by mouth nightly.  90 capsule 1     No facility-administered encounter medications on file as of 9/17/2024.        Allergies:  Review of patient's allergies indicates:  No Known Allergies      Physical Exam       There were no vitals filed for this visit.      Physical Exam  Constitutional:       General: She is not in acute distress.     Appearance: Normal appearance. She is not ill-appearing.   Eyes:      General:         Right eye: No discharge.         Left eye: No discharge.      Extraocular Movements: Extraocular movements intact.      Pupils: Pupils are equal, round, and reactive to light.   Pulmonary:      Effort: Pulmonary effort is normal.   Neurological:      General: No focal deficit present.      Mental Status: She is alert and oriented to person, place, and time.   Psychiatric:         Mood and Affect: Mood normal.         Behavior: Behavior normal.         Thought Content: Thought content normal.          Laboratory:  CBC:  No results for input(s): "WBC", "RBC", "HGB", "HCT", "PLT", "MCV", "MCH", "MCHC" in the last 2160 hours.  CMP:  No results for input(s): "GLU", "CALCIUM", "ALBUMIN", "PROT", "NA", "K", "CO2", "CL", "BUN", "ALKPHOS", "ALT", "AST", "BILITOT" in the last 2160 hours.    Invalid input(s): "CREATININ"  URINALYSIS:  No results for input(s): "COLORU", "CLARITYU", "SPECGRAV", "PHUR", "PROTEINUA", "GLUCOSEU", "BILIRUBINCON", "BLOODU", "WBCU", "RBCU", "BACTERIA", "MUCUS", "NITRITE", "LEUKOCYTESUR", "UROBILINOGEN", "HYALINECASTS" in the last 2160 hours.   LIPIDS:  No results for input(s): "TSH", "HDL", "CHOL", "TRIG", "LDLCALC", "CHOLHDL", "NONHDLCHOL", "TOTALCHOLEST" in the last 2160 hours.  TSH:  No " "results for input(s): "TSH" in the last 2160 hours.  A1C:  No results for input(s): "HGBA1C" in the last 2160 hours.    Radiology:        Assessment:     Sharri Hill is a 59 y.o.female with:    Benign essential hypertension    Insomnia, unspecified type          Plan:     Problem List Items Addressed This Visit          Cardiac/Vascular    Benign essential hypertension - Primary    Overview     Off meds after weight loss , bp looks great              Other    Insomnia    Overview      reviewed cont ambien prn             As above, continue current medications and maintain follow up with specialists.  Return to clinic in 6 months.      David Bose  [facility]                  Answers submitted by the patient for this visit:  Review of Systems Questionnaire (Submitted on 9/12/2024)  activity change: No  unexpected weight change: No  rhinorrhea: No  trouble swallowing: No  visual disturbance: No  chest tightness: No  polyuria: No  difficulty urinating: No  menstrual problem: No  joint swelling: No  arthralgias: No  confusion: No  dysphoric mood: No    "

## 2024-09-24 ENCOUNTER — PATIENT MESSAGE (OUTPATIENT)
Dept: PRIMARY CARE CLINIC | Facility: CLINIC | Age: 59
End: 2024-09-24
Payer: COMMERCIAL

## 2024-09-24 DIAGNOSIS — R11.0 NAUSEA: Primary | ICD-10-CM

## 2024-09-24 DIAGNOSIS — Z87.898 HISTORY OF MOTION SICKNESS: ICD-10-CM

## 2024-09-24 DIAGNOSIS — F41.8 SITUATIONAL ANXIETY: ICD-10-CM

## 2024-09-24 DIAGNOSIS — M17.12 PRIMARY OSTEOARTHRITIS OF LEFT KNEE: ICD-10-CM

## 2024-09-24 DIAGNOSIS — G47.09 OTHER INSOMNIA: ICD-10-CM

## 2024-09-24 RX ORDER — NABUMETONE 750 MG/1
750 TABLET, FILM COATED ORAL DAILY PRN
Qty: 14 TABLET | Refills: 0 | Status: SHIPPED | OUTPATIENT
Start: 2024-09-24

## 2024-09-24 RX ORDER — ZOLPIDEM TARTRATE 10 MG/1
10 TABLET ORAL NIGHTLY
Qty: 7 TABLET | Refills: 0 | Status: SHIPPED | OUTPATIENT
Start: 2024-09-24

## 2024-09-24 RX ORDER — ZOLPIDEM TARTRATE 10 MG/1
10 TABLET ORAL NIGHTLY
Qty: 7 TABLET | Refills: 0 | OUTPATIENT
Start: 2024-09-24

## 2024-09-24 NOTE — TELEPHONE ENCOUNTER
No care due was identified.  [facility] Embedded Care Due Messages. Reference number: [id number].   9/24/2024 4:17:18 PM CDT

## 2024-09-24 NOTE — TELEPHONE ENCOUNTER
No care due was identified.  [facility] Embedded Care Due Messages. Reference number: [id number].   9/24/2024 4:10:11 PM CDT

## 2024-09-24 NOTE — TELEPHONE ENCOUNTER
Patient is going out of town Saturday and will run out of meds while gone. Asking for short supply

## 2024-09-25 RX ORDER — SCOLOPAMINE TRANSDERMAL SYSTEM 1 MG/1
1 PATCH, EXTENDED RELEASE TRANSDERMAL
Qty: 4 PATCH | Refills: 1 | Status: SHIPPED | OUTPATIENT
Start: 2024-09-25

## 2024-09-25 RX ORDER — ONDANSETRON 4 MG/1
4 TABLET, ORALLY DISINTEGRATING ORAL EVERY 8 HOURS PRN
Qty: 30 TABLET | Refills: 0 | Status: SHIPPED | OUTPATIENT
Start: 2024-09-25

## 2024-09-25 RX ORDER — ALPRAZOLAM 0.25 MG/1
0.25 TABLET ORAL 2 TIMES DAILY PRN
Qty: 20 TABLET | Refills: 0 | Status: SHIPPED | OUTPATIENT
Start: 2024-09-25 | End: 2024-10-25

## 2024-09-25 NOTE — TELEPHONE ENCOUNTER
Lov 9/17/24  I offered pt a virtual visit for Friday b/c she requested Valium or Xanax,Scopolamine patches and Promethazine for an upcoming trip. Pt states that this was discussed during her recent virtual visit and you requested she send a message closer to her trip date. I did not see a mention of this. Please advise if you would like her to keep the virtual visit I scheduled for Friday

## 2024-09-25 NOTE — TELEPHONE ENCOUNTER
----- Message from Rosalinda Lerma sent at 9/25/2024  2:05 PM CDT -----  Contact: Mare good/Jaelyn/[phone]  Pharmacy is calling to clarify an RX.    RX name:  ALPRAZolam (XANAX) 0.25 MG tablet    What do they need to clarify:  if the provider is aware that patient is also getting Hydrocodone from another prescriber     Comments:   Phone: [phone] Fax: [phone]

## 2024-09-25 NOTE — TELEPHONE ENCOUNTER
Do you want to check ? Pharmacy wants to make sure you know pt is getting hydrocodone from another provider, is in med list.

## 2024-10-16 DIAGNOSIS — G47.09 OTHER INSOMNIA: ICD-10-CM

## 2024-10-16 RX ORDER — ZOLPIDEM TARTRATE 10 MG/1
10 TABLET ORAL NIGHTLY
Qty: 7 TABLET | Refills: 0 | Status: SHIPPED | OUTPATIENT
Start: 2024-10-16

## 2024-10-16 NOTE — TELEPHONE ENCOUNTER
No care due was identified.  Health [facility] Embedded Care Due Messages. Reference number: [id number].   10/16/2024 12:39:19 PM CDT

## 2024-10-23 ENCOUNTER — PATIENT MESSAGE (OUTPATIENT)
Dept: PRIMARY CARE CLINIC | Facility: CLINIC | Age: 59
End: 2024-10-23
Payer: COMMERCIAL

## 2024-10-23 DIAGNOSIS — G47.09 OTHER INSOMNIA: ICD-10-CM

## 2024-10-23 NOTE — TELEPHONE ENCOUNTER
No care due was identified.  Health [facility] Embedded Care Due Messages. Reference number: [id number].   10/23/2024 12:31:08 PM CDT

## 2024-10-23 NOTE — TELEPHONE ENCOUNTER
Pt needs refill on ambien to be sent for original 30 day supply    9/24 refill encounter, pt requested a refill on Ambien but a 7 day supply for trip she was going on

## 2024-10-24 RX ORDER — ZOLPIDEM TARTRATE 10 MG/1
10 TABLET ORAL NIGHTLY
Qty: 30 TABLET | Refills: 0 | Status: SHIPPED | OUTPATIENT
Start: 2024-10-24

## 2024-11-04 RX ORDER — ESCITALOPRAM OXALATE 20 MG/1
20 TABLET ORAL
Qty: 90 TABLET | Refills: 3 | Status: SHIPPED | OUTPATIENT
Start: 2024-11-04

## 2024-11-04 NOTE — TELEPHONE ENCOUNTER
Refill Decision Note   Sharri Hill  is requesting a refill authorization.  Brief Assessment and Rationale for Refill:  Approve     Medication Therapy Plan:         Comments:     Note composed:12:05 PM 11/04/2024

## 2024-11-04 NOTE — TELEPHONE ENCOUNTER
No care due was identified.  Health [facility] Embedded Care Due Messages. Reference number: [id number].   11/03/2024 11:15:30 PM CST

## 2024-11-13 NOTE — TELEPHONE ENCOUNTER
Assessment/Plan:  Mrs. Haley French is a 75-year-old female patient with past medical history of OA, HTN, gastroparesis, peripheral neuropathy, multiple prior thoracolumbar spine surgeries for congenital scoliosis [place] >[place] w/ [place] of [place] (1991) who is admitted for Acute Inpatient Rehabilitation with a multidisciplinary rehab program at [facility] with functional impairments in ADLs and mobility secondary to mechanical fall reporting a T12 three column spine fracture-malalignment treated surgically with a T9-L3 open fusion with PSO/lag screw partial corpectomy/interbody by Dr. Stroud, Neurosurgeon with complex Plastic Surgery closure by Dr. Elias on 9/29/24 subsequently complicated with MSSA bacteremia secondary to a paraspinal abscess s/p paraspinal collection aspiration on 10/25/24.     #Traumatic T9-L3 vertebral fracture s/p corpectomy and fusion surgery c/b MSSA Bacteremia 2/2 paraspinal abscess  - Paraspinal abscess with Sepsis due to methicillin susceptible Staphylococcus aureus.       * 10/20 Bcx MSSA, 10/21 no growth at 24hrs      * CXR with old rib fracture and small RUL infiltrate      * 10/20 +UA for Ecoli      * MRI: rim-enhancing heterogeneous fluid collection suspicious for an abscess; culture growing GPC in pairs and rare staph aureus       * TTE: no evidence of vegetations      * No Acute surgical intervention per neurosurgery       * S/P paraspinal collection aspiration with paraspinal abscess drain placed with Dr. Guerra 10/25; dislodged accidentally 11/9 with CT showing no retained elements of drain      * Monitor WBC and fever curve      * S/P PICC insertion 10/28      * TLSO when OOB      * Cefazolin 2000mg IV q8 via PICC- until 12/12/24-> Start PO Duricef 500mg BID on 12/13/24 - long term/indefinitely   - Weekly CBC Diff BMP ESR CRP; emailed to [email or web address]  - Activity Limitations: Decreased social, vocational and leisure activities, decreased self care and ADLs, decreased mobility, decreased ability to manage household and finances.   - Comprehensive Multidisciplinary Rehab Program:      * 3 hours a day, 5 days a week.      * PT 2hr/day: Focused on improving strength, endurance, coordination, balance, functional mobility, and transfers      * OT 1hr/day: Focused on improving strength, fine motor skills, coordination, posture and ADLs.      -----------------------------------------------------------------------------------  Concurrent Medical Problems    #Paroxysmal atrial fibrillation  - Metoprolol to 50mg PO BID for rate control  - Eliquis 5mg PO BID    #Acute on chronic low back pain.   - S/P T9-L3 open fusion with PSO/lag screw partial corpectomy/interbody with complex plastic closure (w/ Dr. Stroud and Dr. Tinajero on 9/29  - TLSO when OOB  - Tylenol 975mg PO TID ATC  - Duloxetine 20mg PO daily  - Lyrica 100mg PO TID   - Flexeril 10mg PO TID standing  - diclofenac gel 2g topically BID  - hydromorphone 4mg PO q8h PRN for severe pain    #HTN  #Orthostatic Hypotension  - 10/1 TTE: EF 61%  - S/P 1L IVF bolus (10/13) for symptomatic hypotension with good effect  - metoprolol tartrate 50mg PO BID  - d/reagan losartan  - Chlorthalidone 25 mg daily on hold (restart if SBP is consistently above 140 per cardio)  - Midodrine 7.5 mg on hold    #HLD  - atorvastatin 40mg PO qhs    #Postoperative anemia  - S/p PRBCs intraoperatively for T spine fusion   - Hgb stable with no evidence of active bleed at this time   - 11/11 Hgb 9.5  - Monitor H/H; transfuse for Hgb <7    #SILVANA (obstructive sleep apnea)  - Transient desaturations on RA    - Nocturnal 2L O2 and PRN during day during naps, monitor sats   - F/u with PCP outpatient as may benefit from sleep study and CPAP    #Restless legs syndrome (RLS)  - pramipexole 0.75mg PO daily   - s/w ferrous sulfate 325mg PO daily 11/12    #Seasonal allergies  - montelukast 10mg daily    #Liver cyst  -10/10 CT Abd/pelvis- There is a large cyst, stable since prior exam. There are small hypodense foci on segment II and segment I too small to characterize, unchanged since prior  - f/u outpatient     #Elevated serum alkaline phosphatase level  - high serum alk phos levels 247 --> 228 --> 172 --> 233 --> 239--> 240--> 247--> 234---> 276 -->227 --> 183  - monitor serum alk phos levels    #Gastritis  - Vonoprazan 10mg PO daily  - erythromycin 128mg oral fluid BID    #Mood/Cognition:  - neuropsychology consult PRN    #Sleep:   - maintain quiet hours and low stim environment  - melatonin 6mg HS PRN to maximize participation in therapy during the day    #GI/Bowel:  - senna 1 tablet PO qhs  - d/c Miralax iso soft stools/diarrhea 11/12    #/Bladder:   - PVR q 8 hours (SC if > 400)    #Skin/Pressure Injury:   - Skin assessment on admission: Midline sine dressing with drain in place, with minimal drainage. No pressure injuries    #Diet/Supplement  Current Diet: Regular  - ergocalciferol 16753S PO weekly on Fridays  - multivitamin PO daily     #Precautions / PROPHYLAXIS:   - Falls, Spinal  - ortho: Weight bearing status: WBAT, TLSO brace OOB  - Lungs: Incentive Spirometer   - DVT ppx: SCDs, TEDs, Eliquis 5mg PO BID  - Pressure injury/Skin:  OOB to Chair, PT/OT      ---------------  Code Status: FULL  Emergency Contact:    Outpatient Follow-up:    Fran Carr  Internal Medicine  [address], [address]  [place] [address]  Phone: [phone]  Fax: [phone]  Follow Up Time:     Kaushal Waterman  Infectious Disease  [address] [place] [id number]  Phone: [phone]  Follow Up Time:  [facility]  Neurosurgery  Referral Assistance Program    Guille Lance  [facility]  ORTHOSURG [address] [facility]  Scheduled Appointment: 11/22/2024    Catalino Cotto  [facility]  WEIGHTMGMT  [facility]  Scheduled Appointment: 12/05/2024    Haydee Bernstein  [facility]  GASTRO [address] [facility]  Scheduled Appointment: 01/06/2025      [facility] - Infectious Disease  Infectious Disease  [address], [place] [address]  Phone: [phone]    -------------- Pharm fax req [id number]*[id number] MG TAB

## 2024-11-19 ENCOUNTER — E-VISIT (OUTPATIENT)
Dept: PRIMARY CARE CLINIC | Facility: CLINIC | Age: 59
End: 2024-11-19
Payer: COMMERCIAL

## 2024-11-19 DIAGNOSIS — B02.9 HERPES ZOSTER WITHOUT COMPLICATION: ICD-10-CM

## 2024-11-19 RX ORDER — VALACYCLOVIR HYDROCHLORIDE 1 G/1
1000 TABLET, FILM COATED ORAL 3 TIMES DAILY
Qty: 21 TABLET | Refills: 0 | Status: SHIPPED | OUTPATIENT
Start: 2024-11-19 | End: 2025-11-19

## 2024-11-19 NOTE — PROGRESS NOTES
Patient ID: Sharri Hill is a 59 y.o. female.    Chief Complaint: General Illness (Entered automatically based on patient selection in [employer].)    The patient initiated a request through [employer] on 11/19/2024 for evaluation and management with a chief complaint of General Illness (Entered automatically based on patient selection in [employer].)     I evaluated the questionnaire submission on 11/19/2024.    [facility] Peq Evisit [facility]-Muscle,Back,Joint    11/19/2024 12:38 PM CST - Filed by Patient   What do you need help with? Shoulder Problem   Do you agree to participate in an E-Visit? Yes   If you have any of the following symptoms, please present to your local emergency room or call 911:  I acknowledge   What is the main issue you would like addressed today? I Believe that I have the shingles. Its the same side effects that i had last year so i want to see if i can get treatment for it   Do you have any of the following: New or worsening joint pain   Provide any additional information you feel is important.    Please attach any relevant images or files    Are you able to take your vital signs? Yes   Systolic Blood Pressure: 110   Diastolic Blood Pressure: 73   Weight: 146   Height: 68   Pulse: 71   Temperature:    Respiration rate:    Pulse Oxygen:    Do you have a history of any of the following: None   What joint(s) are you having a problem with? Shoulder   Describe the exact location of the pain. Shoulder and neck pain   What activites make your joint pain worse? Bending over;  Getting up from sitting or lying down;  Lifting anything;  Lying down;  Overhead activities;  Reaching for items;  Running;  Showering or bathing;  Sitting down;  Stretching;  Walking   What have you used to help with your joint pain? Anti-inflammatory (ibuprofen, Aleve, Advil);  Cold or ice;  Pain Relievers (Tylenol)   Has there been any change in your joint pain based on the treatment you have tried? Unchanged   Do you have any  history of joint injuries, surgeries, or other medical conditions that may be related to your joint pain? Yes   Describe any history that may be related to your joint pain. Three bad dics in neck but my pain today is not the same         Encounter Diagnosis   Name Primary?    Herpes zoster without complication         No orders of the defined types were placed in this encounter.     Medications Ordered This Encounter   Medications    valACYclovir (VALTREX) 1000 MG tablet     Sig: Take 1 tablet (1,000 mg total) by mouth 3 (three) times daily.     Dispense:  21 tablet     Refill:  0        Follow up for as scheduled.      E-Visit Time Tracking:    Day 1 Time (in minutes): 5    Total Time (in minutes): 5

## 2024-11-22 DIAGNOSIS — M17.12 PRIMARY OSTEOARTHRITIS OF LEFT KNEE: ICD-10-CM

## 2024-11-22 RX ORDER — NABUMETONE 750 MG/1
750 TABLET, FILM COATED ORAL DAILY PRN
Qty: 30 TABLET | Refills: 1 | Status: SHIPPED | OUTPATIENT
Start: 2024-11-22

## 2024-11-22 NOTE — TELEPHONE ENCOUNTER
No care due was identified.  Health [facility] Embedded Care Due Messages. Reference number: [id number].   11/22/2024 2:24:37 PM CST

## 2024-12-02 DIAGNOSIS — M17.12 PRIMARY OSTEOARTHRITIS OF LEFT KNEE: ICD-10-CM

## 2024-12-02 DIAGNOSIS — G47.09 OTHER INSOMNIA: ICD-10-CM

## 2024-12-03 RX ORDER — ZOLPIDEM TARTRATE 10 MG/1
10 TABLET ORAL NIGHTLY
Qty: 30 TABLET | Refills: 0 | Status: SHIPPED | OUTPATIENT
Start: 2024-12-03

## 2024-12-03 RX ORDER — NABUMETONE 750 MG/1
750 TABLET, FILM COATED ORAL DAILY PRN
Qty: 30 TABLET | Refills: 1 | Status: SHIPPED | OUTPATIENT
Start: 2024-12-03

## 2024-12-03 NOTE — TELEPHONE ENCOUNTER
No care due was identified.  Health [facility] Embedded Care Due Messages. Reference number: [id number].   12/02/2024 7:19:12 PM CST  
no assistance needed

## 2024-12-09 ENCOUNTER — OFFICE VISIT (OUTPATIENT)
Dept: CARDIOLOGY | Facility: CLINIC | Age: 59
End: 2024-12-09
Payer: COMMERCIAL

## 2024-12-09 VITALS
HEART RATE: 86 BPM | SYSTOLIC BLOOD PRESSURE: 129 MMHG | BODY MASS INDEX: 22.66 KG/M2 | HEIGHT: 68 IN | WEIGHT: 149.5 LBS | DIASTOLIC BLOOD PRESSURE: 82 MMHG

## 2024-12-09 DIAGNOSIS — E78.00 PURE HYPERCHOLESTEROLEMIA: ICD-10-CM

## 2024-12-09 DIAGNOSIS — I10 BENIGN ESSENTIAL HYPERTENSION: ICD-10-CM

## 2024-12-09 DIAGNOSIS — R55 SYNCOPE AND COLLAPSE: Primary | ICD-10-CM

## 2024-12-09 PROCEDURE — 93010 ELECTROCARDIOGRAM REPORT: CPT | Mod: S$GLB,,, | Performed by: INTERNAL MEDICINE

## 2024-12-09 PROCEDURE — 99214 OFFICE O/P EST MOD 30 MIN: CPT | Mod: 25,S$GLB,, | Performed by: INTERNAL MEDICINE

## 2024-12-09 PROCEDURE — 99999 PR PBB SHADOW E&M-EST. PATIENT-LVL III: CPT | Mod: PBBFAC,,, | Performed by: INTERNAL MEDICINE

## 2024-12-09 PROCEDURE — 93005 ELECTROCARDIOGRAM TRACING: CPT

## 2024-12-09 NOTE — PROGRESS NOTES
Subjective:   12/09/2024     Patient ID:  Sharri Hill is a 59 y.o. female who presents for evaulation of Loss of Consciousness      Patient comes in today now with episodes of syncope for the last several months.  She has been on antihypertensive medications, all discontinued.  She is on finasteride for hair begun several months ago, that can be associated with orthostatic hypotension, will discontinue.      She has lost 30 lb on therapy with G LP 1 receptor agonist therapy.  She is going to decrease the intensity of therapy.      Previously she did have hypertension and had been on hydrochlorothiazide and losartan, now discontinued.    She does not have palpitations, no exertional chest pains or tightness, no PND or orthopnea.              Prior note reviewed:  Comes in for follow-up.  Her blood pressures and palpitations appear to be improved on combination therapy with hydrochlorothiazide and losartan.  She takes the losartan 50 mg twice a day and hydrochlorothiazide once a day.  She notes that her blood pressures in the afternoon much higher than they are in the morning.  She is still very fatigued throughout the day.  Her  notes that she has severe snoring.  Sleep apnea is questioned.  She does take Ambien at night.  She is very sleepy throughout the morning.    She continues to have diarrhea.  Metoprolol was discontinued, has not made much of a difference.  She takes Imodium every morning.    Echocardiography in 4/23 was normal.      Cardiac risk low.      She does have exertional dyspnea.    Sleep disordered breathing is present, severe daytime somnolence.  Refer to sleep Medicine.    Prior impression:  Benign essential hypertension  Comments:  Change losartan to 100 mg every morning, continue hydrochlorothiazide  Evaluate for sleep apnea    Mixed hyperlipidemia  Comments:  Cardiac risk low    Palpitations  Comments:  Currently quiescent    Daytime somnolence  Comments:  Referral to sleep  Medicine  Orders:  -     Ambulatory referral/consult to Sleep Disorders; Future; Expected date: 11/01/2023    Anginal equivalent  Comments:  Stress echocardiogram to be obtained, she has had poor exertional tolerance, that is a change of onset of recently  Orders:  -     Stress Echo Which stress agent will be used? Treadmill Exercise; Color Flow Doppler? No; Future        Previous note:    Here for preop clearance and evaluation for hypertension.  She had a workup for secondary causes of hypertension which was negative.  Her blood pressures at home have been running proximally 140/72.  Still slightly elevated on losartan 25 and HCTZ 25 mg daily.  She had previously had problem with hyperkalemia on losartan alone.  Her last potassium was 4.2 on that combination.      She does not have chest pains tightness PND orthopnea.      She is scheduled for gyn surgery    The 10-year ASCVD risk score (Delroy JUNG, et al., 2019) is: 3.1%    Values used to calculate the score:      Age: 57 years      Sex: Female      Is Non- : No      Diabetic: No      Tobacco smoker: No      Systolic Blood Pressure: 132 mmHg      Is BP treated: Yes      HDL Cholesterol: 78 mg/dL      Total Cholesterol: 214 mg/dL        Prior note:    Comes in for cardiac evaluation, she had new onset hypertension several months ago, she is felt poorly ever since then.  She is able to exercise without chest pains or tightness, PND or orthopnea.  A Holter monitor showed rare PACs and PVCs.  Renal function has been normal.  The hypertension did appear to be a fairly abrupt onset.  It may have been associated with a steroid shot.      She does not smoke cigarettes.  She drinks alcohol occasionally.  She has not been diagnosed with sleep apnea, but she does snore.    She has felt better on the current combination losartan HCT.    Prior note:   Sharri Hill is a 57 y.o. female with past medical history of hypertension who showed up to clinic  for routine follow-up. Has been uncontrolled over the last few months, currently she is on nifedipine 60 mg and losartan 25 mg daily. She has not feeling well and she is fatigued all the time. Blood pressure check at home SBP 130s to 150s, she reported bilateral ankle edema.    Current Medications:  Current Outpatient Medications on File Prior to Visit   Medication Sig Dispense Refill   · butalbital-acetaminophen-caff -40 mg Cap capsule   · escitalopram oxalate (LEXAPRO) 10 MG tablet Take 10 mg daily by mouth   · escitalopram oxalate (LEXAPRO) 20 MG tablet Take 20 mg daily by mouth   · estradioL (ESTRACE) 0.01 % (0.1 mg/gram) vaginal cream   · gabapentin (NEURONTIN) 300 MG capsule Take 300 mg daily by mouth   · HYDROcodone-acetaminophen (NORCO) 7.5-325 mg per tablet Take 1 tablet as needed by mouth   · losartan (COZAAR) 25 MG tablet Take 1 tablet daily by mouth 30 tablet 11   · miSOPROStoL (CYTOTEC) 200 MCG tablet Take 200 mcg by mouth   · nabumetone (RELAFEN) 750 MG tablet Take 750 mg 2 (two) times daily by mouth   · pantoprazole (PROTONIX) 40 MG tablet Take 40 mg daily by mouth   · progesterone (PROMETRIUM) 100 MG capsule Take 100 mg daily by mouth   · promethazine (PHENERGAN) 25 MG tablet Take 25 mg as needed by mouth   · sucralfate (CARAFATE) 1 gram tablet Take 1 g as needed by mouth   · tiZANidine (ZANAFLEX) 4 MG tablet Take 4 mg 2 (two) times a day by mouth   · tolterodine (DETROL LA) 4 MG 24 hr capsule Take 4 mg daily by mouth   · XIIDRA 5 % Dpet   · zolpidem (AMBIEN) 10 mg tablet Take 10 mg daily by mouth   · [DISCONTINUED] NIFEdipine (ADALAT CC) 60 MG 24 hr tablet Take 1 tablet daily by mouth 30 tablet 5   · [DISCONTINUED] MYRBETRIQ 50 mg Tb24 Take 50 mg daily by mouth     No current facility-administered medications on file prior to visit.     PROBLEM LIST:     Encounter Diagnoses   Name Primary?   · Essential hypertension     Plan:     # uncontrolled hypertension. Will discontinue nifedipine and  started on hydrochlorothiazide 25 mg daily in addition to losartan 25 mg daily. BMP ordered    Follow-up in 3 months      Past Medical History:   Diagnosis Date    Abnormal Pap smear of cervix 1992    Cryo Done ([place])     Anxiety     Esophageal reflux     History of shingles 2012, 2021    Right arm 2012  and  Left hip 2021    Menopause 2018    Migraine     Urge incontinence        Review of patient's allergies indicates:  No Known Allergies      Current Outpatient Medications:     butalbital-acetaminophen-caff -40 mg Cap, , Disp: , Rfl:     EScitalopram oxalate (LEXAPRO) 20 MG tablet, TAKE 1 TABLET DAILY, Disp: 90 tablet, Rfl: 3    estradioL (DIVIGEL) 0.5 mg/0.5 gram (0.1 %) GlPk, Place 1 application  onto the skin once daily., Disp: 90 packet, Rfl: 1    estradioL (ESTRACE) 0.01 % (0.1 mg/gram) vaginal cream, Place 1 g vaginally once daily., Disp: 85 g, Rfl: 2    finasteride (PROPECIA) 1 mg tablet, Take 1 tablet (1 mg total) by mouth once daily., Disp: 30 tablet, Rfl: 6    gabapentin (NEURONTIN) 300 MG capsule, Take 1 capsule (300 mg total) by mouth 2 (two) times daily., Disp: 180 capsule, Rfl: 0    gabapentin (NEURONTIN) 600 MG tablet, Take by mouth once daily., Disp: , Rfl:     HYDROcodone-acetaminophen (NORCO) 7.5-325 mg per tablet, Take 1 tablet by mouth 2 (two) times daily as needed., Disp: , Rfl:     nabumetone (RELAFEN) 750 MG tablet, Take 1 tablet (750 mg total) by mouth daily as needed., Disp: 30 tablet, Rfl: 1    NURTEC 75 mg odt, Take 75 mg by mouth daily as needed., Disp: , Rfl:     ondansetron (ZOFRAN-ODT) 4 MG TbDL, Take 1 tablet (4 mg total) by mouth every 8 (eight) hours as needed., Disp: 30 tablet, Rfl: 0    progesterone (PROMETRIUM) 200 MG capsule, Take 1 capsule (200 mg total) by mouth nightly., Disp: 90 capsule, Rfl: 1    promethazine (PHENERGAN) 25 MG tablet, Take 25-50 mg by mouth every 8 (eight) hours as needed., Disp: , Rfl:     PROTONIX 40 mg tablet, Take 40 mg by mouth every  morning., Disp: , Rfl:     scopolamine (TRANSDERM-SCOP) 1.3-1.5 mg (1 mg over 3 days), Place 1 patch onto the skin Every 3 (three) days., Disp: 4 patch, Rfl: 1    testosterone (TESTIM) 50 mg/5 gram (1 %) Gel, Apply 0.5 g topically once daily., Disp: 150 g, Rfl: 0    tirzepatide (MOUNJARO SUBQ), Inject into the skin., Disp: , Rfl:     tiZANidine (ZANAFLEX) 4 MG tablet, Take 4 mg by mouth 2 (two) times daily., Disp: , Rfl:     tolterodine (DETROL LA) 4 MG 24 hr capsule, Take 1 capsule (4 mg total) by mouth once daily., Disp: 90 capsule, Rfl: 3    zolpidem (AMBIEN) 10 mg Tab, Take 1 tablet (10 mg total) by mouth every evening., Disp: 30 tablet, Rfl: 0    ALPRAZolam (XANAX) 0.25 MG tablet, Take 1 tablet (0.25 mg total) by mouth 2 (two) times daily as needed for Anxiety., Disp: 20 tablet, Rfl: 0     Objective:   Review of Systems   Cardiovascular:  Positive for dyspnea on exertion. Negative for chest pain, claudication, cyanosis, irregular heartbeat, leg swelling, near-syncope, orthopnea, palpitations, paroxysmal nocturnal dyspnea and syncope.   Gastrointestinal:  Positive for diarrhea.         Vitals:    12/09/24 1530   BP: 129/82   Pulse: 86     Wt Readings from Last 3 Encounters:   12/09/24 67.8 kg (149 lb 7.6 oz)   05/08/24 71.2 kg (157 lb)   05/08/24 71.5 kg (157 lb 10.1 oz)     Temp Readings from Last 3 Encounters:   05/03/24 97 °F (36.1 °C) (Oral)   05/11/23 98.4 °F (36.9 °C) (Temporal)   04/11/23 97.4 °F (36.3 °C) (Oral)     BP Readings from Last 3 Encounters:   12/09/24 129/82   05/08/24 100/60   05/06/24 105/64     Pulse Readings from Last 3 Encounters:   12/09/24 86   05/06/24 76   05/03/24 85             Physical Exam  Vitals reviewed.   Constitutional:       General: She is not in acute distress.     Appearance: She is well-developed.   HENT:      Head: Normocephalic and atraumatic.      Nose: Nose normal.   Eyes:      Conjunctiva/sclera: Conjunctivae normal.      Pupils: Pupils are equal, round, and  reactive to light.   Neck:      Vascular: No carotid bruit or JVD.   Cardiovascular:      Rate and Rhythm: Normal rate and regular rhythm.      Pulses: Normal pulses and intact distal pulses.      Heart sounds: Normal heart sounds. No murmur heard.     No friction rub. No gallop.   Pulmonary:      Effort: Pulmonary effort is normal. No respiratory distress.      Breath sounds: Normal breath sounds. No wheezing or rales.   Chest:      Chest wall: No tenderness.   Abdominal:      General: Bowel sounds are normal. There is no distension.      Palpations: Abdomen is soft.      Tenderness: There is no abdominal tenderness.   Musculoskeletal:         General: No tenderness or deformity. Normal range of motion.      Cervical back: Normal range of motion and neck supple.      Right lower leg: No edema.      Left lower leg: No edema.   Skin:     General: Skin is warm and dry.      Findings: No erythema or rash.   Neurological:      Mental Status: She is alert and oriented to person, place, and time.      Cranial Nerves: No cranial nerve deficit.      Motor: No abnormal muscle tone.      Coordination: Coordination normal.   Psychiatric:         Behavior: Behavior normal.         Thought Content: Thought content normal.         Judgment: Judgment normal.           Lab Results   Component Value Date    CHOL 264 (H) 05/08/2024    CHOL 214 (H) 12/22/2022    CHOL 256 (H) 03/30/2022     Lab Results   Component Value Date    HDL 84 (H) 05/08/2024    HDL 78 (H) 12/22/2022    HDL 89 (H) 03/30/2022     Lab Results   Component Value Date    LDLCALC 159.4 (H) 05/08/2024    LDLCALC 125 12/22/2022    LDLCALC 154.8 03/30/2022     Lab Results   Component Value Date    ALT 20 05/08/2024    AST 19 05/08/2024    AST 30 03/30/2022    AST 28 02/09/2021     Lab Results   Component Value Date    CREATININE 0.9 05/08/2024    BUN 22 (H) 05/08/2024     05/08/2024    K 3.7 05/08/2024    CO2 20 (L) 05/08/2024    CO2 24 04/11/2023    CO2 24  03/30/2022     Lab Results   Component Value Date    HGB 12.2 05/08/2024    HCT 36.9 (L) 05/08/2024    HCT 40.9 03/30/2022    HCT 40.9 02/09/2021                         Assessment and Plan:     Syncope and collapse  Comments:  Evaluation to ensue   Discontinue finasteride   Decrease G LP 1 receptor agonist therapy  Orders:  -     IN OFFICE EKG 12-LEAD (to Muse)  -     Comprehensive Metabolic Panel; Future; Expected date: 12/16/2024  -     CBC Auto Differential; Future; Expected date: 12/16/2024  -     TSH; Future; Expected date: 12/09/2024  -     Echo; Future; Expected date: 12/16/2024  -     Cardiac Monitor - 3-15 Day Adult; Future; Expected date: 12/10/2024    Benign essential hypertension  Comments:  Now resolved with weight loss    Pure hypercholesterolemia  -     Apolipoprotein B; Future; Expected date: 12/10/2024  -     Lipid Panel; Future; Expected date: 12/16/2024  -     Lipoprotein A (LPA); Future; Expected date: 12/16/2024           No follow-ups on file.          No future appointments.

## 2024-12-10 LAB
OHS QRS DURATION: 76 MS
OHS QTC CALCULATION: 437 MS

## 2024-12-11 ENCOUNTER — CLINICAL SUPPORT (OUTPATIENT)
Dept: CARDIOLOGY | Facility: HOSPITAL | Age: 59
End: 2024-12-11
Attending: INTERNAL MEDICINE
Payer: COMMERCIAL

## 2024-12-11 ENCOUNTER — HOSPITAL ENCOUNTER (OUTPATIENT)
Dept: CARDIOLOGY | Facility: HOSPITAL | Age: 59
Discharge: HOME | End: 2024-12-11
Attending: INTERNAL MEDICINE
Payer: COMMERCIAL

## 2024-12-11 VITALS
DIASTOLIC BLOOD PRESSURE: 70 MMHG | HEART RATE: 75 BPM | WEIGHT: 149 LBS | HEIGHT: 68 IN | SYSTOLIC BLOOD PRESSURE: 132 MMHG | BODY MASS INDEX: 22.58 KG/M2

## 2024-12-11 DIAGNOSIS — R55 SYNCOPE AND COLLAPSE: ICD-10-CM

## 2024-12-11 LAB
ASCENDING AORTA: 3.41 CM
AV AREA BY CONTINUOUS VTI: 2.6 CM2
AV INDEX (PROSTH): 0.86
AV LVOT MEAN GRADIENT: 4 MMHG
AV LVOT PEAK GRADIENT: 8 MMHG
AV MEAN GRADIENT: 4.7 MMHG
AV PEAK GRADIENT: 9 MMHG
AV VALVE AREA BY VELOCITY RATIO: 2.9 CM²
AV VALVE AREA: 2.7 CM2
AV VELOCITY RATIO: 0.93
BSA FOR ECHO PROCEDURE: 1.8 M2
CV ECHO LV RWT: 0.36 CM
DOP CALC AO PEAK VEL: 1.5 M/S
DOP CALC AO VTI: 33.7 CM
DOP CALC LVOT AREA: 3.1 CM2
DOP CALC LVOT DIAMETER: 2 CM
DOP CALC LVOT PEAK VEL: 1.4 M/S
DOP CALC LVOT STROKE VOLUME: 91.4 CM3
DOP CALC RVOT AREA: 2.72 CM2
DOP CALC RVOT DIAMETER: 1.86 CM
DOP CALCLVOT PEAK VEL VTI: 29.1 CM
E WAVE DECELERATION TIME: 220.84 MS
E/A RATIO: 1.48
E/E' RATIO: 10.5 M/S
ECHO EF ESTIMATED: 59 %
ECHO LV POSTERIOR WALL: 0.8 CM (ref 0.6–1.1)
FRACTIONAL SHORTENING: 31.8 % (ref 28–44)
HR MV ECHO: 75 BPM
INTERVENTRICULAR SEPTUM: 0.8 CM (ref 0.6–1.1)
IVC DIAMETER: 1.53 CM
LA MAJOR: 5.01 CM
LA MINOR: 4.67 CM
LA WIDTH: 3.77 CM
LEFT ATRIUM SIZE: 4 CM
LEFT ATRIUM VOLUME INDEX MOD: 28.3 ML/M2
LEFT ATRIUM VOLUME INDEX: 34.4 ML/M2
LEFT ATRIUM VOLUME MOD: 50.93 ML
LEFT ATRIUM VOLUME: 61.96 CM3
LEFT INTERNAL DIMENSION IN SYSTOLE: 3 CM (ref 2.1–4)
LEFT VENTRICLE DIASTOLIC VOLUME INDEX: 47.48 ML/M2
LEFT VENTRICLE DIASTOLIC VOLUME: 85.47 ML
LEFT VENTRICLE MASS INDEX: 60.8 G/M2
LEFT VENTRICLE SYSTOLIC VOLUME INDEX: 19.6 ML/M2
LEFT VENTRICLE SYSTOLIC VOLUME: 35.23 ML
LEFT VENTRICULAR INTERNAL DIMENSION IN DIASTOLE: 4.4 CM (ref 3.5–6)
LEFT VENTRICULAR MASS: 109.4 G
LV LATERAL E/E' RATIO: 8.75
LV SEPTAL E/E' RATIO: 13.13
MV A" WAVE DURATION": 77.07 MS
MV PEAK A VEL: 0.71 M/S
MV PEAK E VEL: 1.05 M/S
MV PEAK GRADIENT: 2 MMHG
OHS CV RV/LV RATIO: 0.57 CM
PISA TR MAX VEL: 2.72 M/S
PULM VEIN A" WAVE DURATION": 77.07 MS
PULM VEIN S/D RATIO: 1.17
PULMONIC VEIN PEAK A VELOCITY: 0.3 M/S
PV PEAK D VEL: 0.3 M/S
PV PEAK S VEL: 0.35 M/S
RA MAJOR: 4.75 CM
RA PRESSURE ESTIMATED: 3 MMHG
RA WIDTH: 2.8 CM
RIGHT ATRIAL AREA: 11.8 CM2
RIGHT VENTRICLE DIASTOLIC BASEL DIMENSION: 2.5 CM
RV TB RVSP: 6 MMHG
RV TISSUE DOPPLER FREE WALL SYSTOLIC VELOCITY 1 (APICAL 4 CHAMBER VIEW): 16.23 CM/S
SINUS: 2.85 CM
STJ: 2.68 CM
TDI LATERAL: 0.12 M/S
TDI SEPTAL: 0.08 M/S
TDI: 0.1 M/S
TR MAX PG: 30 MMHG
TRICUSPID ANNULAR PLANE SYSTOLIC EXCURSION: 2.4 CM
TV PEAK GRADIENT: 30 MMHG
TV REST PULMONARY ARTERY PRESSURE: 33 MMHG
Z-SCORE OF LEFT VENTRICULAR DIMENSION IN END DIASTOLE: -1.23
Z-SCORE OF LEFT VENTRICULAR DIMENSION IN END SYSTOLE: -0.2

## 2024-12-11 PROCEDURE — 93306 TTE W/DOPPLER COMPLETE: CPT

## 2024-12-11 PROCEDURE — 93306 TTE W/DOPPLER COMPLETE: CPT | Mod: 26,,, | Performed by: INTERNAL MEDICINE

## 2024-12-11 PROCEDURE — 93242 EXT ECG>48HR<7D RECORDING: CPT

## 2024-12-23 ENCOUNTER — PATIENT MESSAGE (OUTPATIENT)
Dept: CARDIOLOGY | Facility: CLINIC | Age: 59
End: 2024-12-23
Payer: COMMERCIAL

## 2024-12-26 DIAGNOSIS — G47.09 OTHER INSOMNIA: ICD-10-CM

## 2024-12-26 NOTE — TELEPHONE ENCOUNTER
No care due was identified.  Health [facility] Embedded Care Due Messages. Reference number: [id number].   12/26/2024 3:53:44 PM CST

## 2024-12-27 DIAGNOSIS — M17.12 PRIMARY OSTEOARTHRITIS OF LEFT KNEE: ICD-10-CM

## 2024-12-27 RX ORDER — NABUMETONE 750 MG/1
750 TABLET, FILM COATED ORAL DAILY PRN
Qty: 30 TABLET | Refills: 1 | Status: SHIPPED | OUTPATIENT
Start: 2024-12-27

## 2024-12-27 RX ORDER — ZOLPIDEM TARTRATE 10 MG/1
10 TABLET ORAL NIGHTLY
Qty: 30 TABLET | Refills: 0 | Status: SHIPPED | OUTPATIENT
Start: 2025-01-02

## 2024-12-27 NOTE — TELEPHONE ENCOUNTER
No care due was identified.  Health [facility] Embedded Care Due Messages. Reference number: [id number].   12/27/2024 11:52:34 AM CST

## 2025-02-17 ENCOUNTER — PATIENT MESSAGE (OUTPATIENT)
Dept: ADMINISTRATIVE | Facility: HOSPITAL | Age: 60
End: 2025-02-17
Payer: COMMERCIAL

## 2025-02-27 ENCOUNTER — PATIENT OUTREACH (OUTPATIENT)
Dept: ADMINISTRATIVE | Facility: HOSPITAL | Age: 60
End: 2025-02-27
Payer: COMMERCIAL

## 2025-02-27 DIAGNOSIS — Z12.31 OTHER SCREENING MAMMOGRAM: Primary | ICD-10-CM

## 2025-02-27 NOTE — PROGRESS NOTES
Health Maintenance Due   Topic Date Due    TETANUS VACCINE  Never done    Shingles Vaccine (1 of 2) Never done    Pneumococcal Vaccines (Age 50+) (1 of 1 - PCV) Never done    RSV Vaccine (Age 60+ and Pregnant patients) (1 - Risk 60-74 years 1-dose series) Never done    Influenza Vaccine (1) 09/01/2024    COVID-19 Vaccine (5 - 2024-25 season) 09/01/2024    Mammogram  05/08/2025     Chart review completed.  Updated. Triggered Links. Immunizations reviewed and updated. Care Everywhere Updated. Care Team Updated.  Placed mammo order. Scheduled appt.

## (undated) DEVICE — SET BASIN 48X48IN 6000ML RING

## (undated) DEVICE — SOL POVIDONE PREP IODINE 4 OZ

## (undated) DEVICE — GLOVE BIOGEL SKINSENSE PI 6.5

## (undated) DEVICE — Device

## (undated) DEVICE — PACK FLUENT DISPOSABLE

## (undated) DEVICE — SOL IRR SOD CHL .9% POUR

## (undated) DEVICE — SOL POVIDONE SCRUB IODINE 4 OZ

## (undated) DEVICE — SEAL LENS SCOPE MYOSURE

## (undated) DEVICE — SOL NACL IRR 3000ML